# Patient Record
Sex: MALE | Race: WHITE | Employment: OTHER | URBAN - METROPOLITAN AREA
[De-identification: names, ages, dates, MRNs, and addresses within clinical notes are randomized per-mention and may not be internally consistent; named-entity substitution may affect disease eponyms.]

---

## 2018-11-14 PROBLEM — R63.30 FEEDING DIFFICULTIES AND MISMANAGEMENT: Status: ACTIVE | Noted: 2018-11-14

## 2018-12-12 ENCOUNTER — HOSPITAL ENCOUNTER (OUTPATIENT)
Dept: LAB | Age: 58
Discharge: HOME | End: 2018-12-12
Payer: COMMERCIAL

## 2018-12-12 PROCEDURE — 87077 CULTURE AEROBIC IDENTIFY: CPT

## 2018-12-12 PROCEDURE — 87205 SMEAR GRAM STAIN: CPT

## 2018-12-12 PROCEDURE — 87186 SC STD MICRODIL/AGAR DIL: CPT

## 2018-12-16 LAB
BACTERIA SPEC CULT: ABNORMAL
BACTERIA SPEC CULT: ABNORMAL
GRAM STN SPEC: ABNORMAL
SERVICE CMNT-IMP: ABNORMAL

## 2019-02-07 ENCOUNTER — DOCUMENTATION ONLY (OUTPATIENT)
Dept: OTOLARYNGOLOGY | Age: 59
End: 2019-02-07

## 2019-02-26 ENCOUNTER — ANESTHESIA EVENT (OUTPATIENT)
Dept: ENDOSCOPY | Age: 59
End: 2019-02-26
Payer: COMMERCIAL

## 2019-02-26 RX ORDER — SODIUM CHLORIDE, SODIUM LACTATE, POTASSIUM CHLORIDE, CALCIUM CHLORIDE 600; 310; 30; 20 MG/100ML; MG/100ML; MG/100ML; MG/100ML
100 INJECTION, SOLUTION INTRAVENOUS CONTINUOUS
Status: CANCELLED | OUTPATIENT
Start: 2019-02-26

## 2019-02-27 ENCOUNTER — ANESTHESIA (OUTPATIENT)
Dept: ENDOSCOPY | Age: 59
End: 2019-02-27
Payer: COMMERCIAL

## 2019-02-27 ENCOUNTER — HOSPITAL ENCOUNTER (OUTPATIENT)
Age: 59
Setting detail: OUTPATIENT SURGERY
Discharge: HOME | End: 2019-02-27
Attending: INTERNAL MEDICINE | Admitting: INTERNAL MEDICINE
Payer: COMMERCIAL

## 2019-02-27 VITALS
HEIGHT: 71 IN | DIASTOLIC BLOOD PRESSURE: 72 MMHG | HEART RATE: 85 BPM | SYSTOLIC BLOOD PRESSURE: 119 MMHG | OXYGEN SATURATION: 92 % | TEMPERATURE: 98 F | WEIGHT: 140 LBS | RESPIRATION RATE: 16 BRPM | BODY MASS INDEX: 19.6 KG/M2

## 2019-02-27 PROCEDURE — 77030007289 HC DEV ROTH NET RETRV STRC -B: Performed by: INTERNAL MEDICINE

## 2019-02-27 PROCEDURE — 77030020268 HC MISC GENERAL SUPPLY: Performed by: INTERNAL MEDICINE

## 2019-02-27 PROCEDURE — 74011000250 HC RX REV CODE- 250

## 2019-02-27 PROCEDURE — 76040000026: Performed by: INTERNAL MEDICINE

## 2019-02-27 PROCEDURE — 74011250636 HC RX REV CODE- 250/636

## 2019-02-27 PROCEDURE — 76060000032 HC ANESTHESIA 0.5 TO 1 HR: Performed by: INTERNAL MEDICINE

## 2019-02-27 PROCEDURE — 77030013991 HC SNR POLYP ENDOSC BSC -A: Performed by: INTERNAL MEDICINE

## 2019-02-27 PROCEDURE — 74011250636 HC RX REV CODE- 250/636: Performed by: ANESTHESIOLOGY

## 2019-02-27 RX ORDER — PROPOFOL 10 MG/ML
INJECTION, EMULSION INTRAVENOUS AS NEEDED
Status: DISCONTINUED | OUTPATIENT
Start: 2019-02-27 | End: 2019-02-27 | Stop reason: HOSPADM

## 2019-02-27 RX ORDER — LIDOCAINE HYDROCHLORIDE 20 MG/ML
INJECTION, SOLUTION EPIDURAL; INFILTRATION; INTRACAUDAL; PERINEURAL AS NEEDED
Status: DISCONTINUED | OUTPATIENT
Start: 2019-02-27 | End: 2019-02-27 | Stop reason: HOSPADM

## 2019-02-27 RX ORDER — EPHEDRINE SULFATE 50 MG/ML
INJECTION, SOLUTION INTRAVENOUS AS NEEDED
Status: DISCONTINUED | OUTPATIENT
Start: 2019-02-27 | End: 2019-02-27 | Stop reason: HOSPADM

## 2019-02-27 RX ORDER — PROPOFOL 10 MG/ML
INJECTION, EMULSION INTRAVENOUS
Status: DISCONTINUED | OUTPATIENT
Start: 2019-02-27 | End: 2019-02-27 | Stop reason: HOSPADM

## 2019-02-27 RX ORDER — SODIUM CHLORIDE, SODIUM LACTATE, POTASSIUM CHLORIDE, CALCIUM CHLORIDE 600; 310; 30; 20 MG/100ML; MG/100ML; MG/100ML; MG/100ML
100 INJECTION, SOLUTION INTRAVENOUS CONTINUOUS
Status: DISCONTINUED | OUTPATIENT
Start: 2019-02-27 | End: 2019-02-27 | Stop reason: HOSPADM

## 2019-02-27 RX ORDER — SODIUM CHLORIDE, SODIUM LACTATE, POTASSIUM CHLORIDE, CALCIUM CHLORIDE 600; 310; 30; 20 MG/100ML; MG/100ML; MG/100ML; MG/100ML
INJECTION, SOLUTION INTRAVENOUS
Status: DISCONTINUED | OUTPATIENT
Start: 2019-02-27 | End: 2019-02-27 | Stop reason: HOSPADM

## 2019-02-27 RX ADMIN — PROPOFOL 50 MG: 10 INJECTION, EMULSION INTRAVENOUS at 10:17

## 2019-02-27 RX ADMIN — SODIUM CHLORIDE, SODIUM LACTATE, POTASSIUM CHLORIDE, CALCIUM CHLORIDE: 600; 310; 30; 20 INJECTION, SOLUTION INTRAVENOUS at 10:07

## 2019-02-27 RX ADMIN — LIDOCAINE HYDROCHLORIDE 50 MG: 20 INJECTION, SOLUTION EPIDURAL; INFILTRATION; INTRACAUDAL; PERINEURAL at 10:17

## 2019-02-27 RX ADMIN — PROPOFOL 200 MCG/KG/MIN: 10 INJECTION, EMULSION INTRAVENOUS at 10:19

## 2019-02-27 RX ADMIN — SODIUM CHLORIDE, SODIUM LACTATE, POTASSIUM CHLORIDE, AND CALCIUM CHLORIDE 100 ML/HR: 600; 310; 30; 20 INJECTION, SOLUTION INTRAVENOUS at 09:59

## 2019-02-27 RX ADMIN — EPHEDRINE SULFATE 5 MG: 50 INJECTION, SOLUTION INTRAVENOUS at 10:24

## 2019-02-27 RX ADMIN — PROPOFOL 40 MG: 10 INJECTION, EMULSION INTRAVENOUS at 10:19

## 2019-02-27 NOTE — ANESTHESIA POSTPROCEDURE EVALUATION
Procedure(s): ESOPHAGOGASTRODUODENOSCOPY (EGD) COLONOSCOPY BMI 19 ESOPHAGEAL DILATION 
PERCUTANEOUS ENDOSCOPIC GASTROSTOMY TUBE CHANGE 
ENDOSCOPIC FOREIGN BODY REMOVAL. Anesthesia Post Evaluation Patient location during evaluation: PACU Patient participation: complete - patient participated Level of consciousness: awake Pain management: satisfactory to patient Airway patency: patent Anesthetic complications: no 
Cardiovascular status: hemodynamically stable Respiratory status: spontaneous ventilation Hydration status: euvolemic Post anesthesia nausea and vomiting:  none Visit Vitals /58 Pulse 80 Temp 36.7 °C (98 °F) Resp 14 Ht 5' 11\" (1.803 m) Wt 63.5 kg (140 lb) SpO2 97% BMI 19.53 kg/m²

## 2019-02-27 NOTE — PROCEDURES
COLONOSCOPY    DATE of PROCEDURE: 2/27/2019    INDICATIONS:  1. Screening for colon cancer     MEDICATION:  MAC      INSTRUMENT: PCF    PROCEDURE: After obtaining informed consent, the patient was placed in the left lateral position and sedated. The endoscope was advanced to the descending colon. The preparation was poor from the rectum to the descending colon, so the procedure was discontinued. He had not followed preparation and had had blended food yesterday. Estimated blood loss: 0-minimal     ASSESSMENT/PLAN:  1.  Repeat colonoscopy with diet adherence       Jenny Saldana MD  [facility], [place]

## 2019-02-27 NOTE — ROUTINE PROCESS
VSS. No complaints noted. Education reviewed and signed with patient and wife. Pt discharged via wheelchair by Diya Robles RN.

## 2019-02-27 NOTE — PROCEDURES
Esophagogastroduodenoscopy    DATE of PROCEDURE: 2/27/2019    INDICATIONS:  1. Dysphagia  2. PEG change    MEDICATIONS ADMINISTERED: MAC    INSTRUMENT: GIF    PROCEDURE:  After obtaining informed consent, the patient was placed in the left lateral position and sedated. The endoscope was advanced under direct vision without difficulty. The esophagus, stomach (including retroflexed views) and duodenum were evaluated. The patient was taken to the recovery area in stable condition. FINDINGS:  ESOPHAGUS: Radiation stricture in the proximal esophagus. Serial dilation up to [address] was performed with mild trauma. STOMACH: Gastric tube present. There was no port to deflate the plastic inner bumper. Therefore, the tube portion was cut with scissors on the skin side of the patient. The inner bumper was grasped witha Barrera net, but was unable to be pulled through the proximal esophageal stricture. On relook, there was some mild trauma after attempting to pull the bumper through, so I could not dilate further. I attempted to cut the bumper with a snare, but the rubber would not cut. We have no electrocautery snares available that function without the patient being padded, so I could not electrocauterize the snare to help with cutting. The internal bumper was therefore left in the patient, as it is less than 2 cm in diameter and should pass spontaneously. DUODENUM: Normal     Estimated blood loss: 0-minimal     PLAN:  1. Abdominal x-ray 1 week   2.  Proceed with colonoscopy     Iona Bill MD  [facility], [place]

## 2019-02-27 NOTE — H&P
[facility] H&P (Admission)        Date:  2019    Chief Complaint: dysphagia, colon cancer screen    Subjective:     History of Present Illness:  Patient is a 60 y.o. being admitted for EGD/colon. PMH:  Past Medical History:   Diagnosis Date    Chronic pain     arms    Hypothyroidism     controlled with medication    [facility]per[facility]stomy[facility] [address]     Pneumonia     Throat cancer ([facility] [address]        PSH:  Past Surgical History:   Procedure Laterality Date    ABDOMEN SURGERY PROC UNLISTED      PEG tube insertion    HX APPENDECTOMY      HX COLONOSCOPY         Allergies: Allergies   Allergen Reactions    Demerol [Meperidine] Rash       Home Medications:  Prior to Admission medications    Medication Sig Start Date End Date Taking? Authorizing Provider   oxyCODONE-acetaminophen (PERCOCET)  mg per tablet Take 1 Tab by mouth every six (6) hours as needed for Pain. Yes Provider, Historical   fluticasone (FLONASE) 50 mcg/actuation nasal spray 2 Sprays by Both Nostrils route daily. 18  Yes Martina Mora MD   fluconazole (DIFLUCAN) 100 mg tablet Take 200 mg by mouth daily. FDA advises cautious prescribing of oral fluconazole in pregnancy.    Yes Provider, Historical   thyroid, Pork, (ARMOUR THYROID) 15 mg tablet 1 q daily 18  Yes Nat Chou MD       Hospital Medications:  Current Facility-Administered Medications   Medication Dose Route Frequency    lactated Ringers infusion  100 mL/hr IntraVENous CONTINUOUS       Social History:  Social History     Tobacco Use    Smoking status: Former Smoker     Packs/day: 1.00     Years: 20.00     Pack years: 20.00     Last attempt to quit:      Years since quittin.1    Smokeless tobacco: Never Used   Substance Use Topics    Alcohol use: No     Frequency: Never         Family History:  Family History   Problem Relation Age of Onset    Diabetes Mother        Review of Systems:  A detailed 10 system ROS is obtained, with pertinent positives as listed above. All others are negative. Objective:     Physical Exam:  Vitals:  Visit Vitals  /80   Pulse 93   Temp 98.3 °F (36.8 °C)   Resp 18   Ht 5' 11\" (1.803 m)   Wt 63.5 kg (140 lb)   SpO2 91%   BMI 19.53 kg/m²     Gen:  Pt is alert, cooperative, no acute distress  Skin: no large lesions  HEENT: MMM  Cardiovascular: Regular rate and rhythm. No murmurs, gallops, or rubs. Respiratory:  Comfortable breathing with no accessory muscle use. Clear breath sounds with no wheezes, rales, or rhonchi. GI:  Abdomen nondistended, soft, and nontender. Normal active bowel sounds. PEG in place  Neurological:  Gross memory appears intact. Patient is alert and oriented. Psychiatric:  Mood appears appropriate with judgement intact. Laboratory:    No results for input(s): WBC, HGB, HCT, PLT, MCV, NA, K, CL, CO2, BUN, CREA, CA, MG, GLU, AP, SGOT, GPT, TBIL, CBIL, ALB, TP, AML, LPSE, PTP, INR, APTT, HGBEXT, HCTEXT, PLTEXT in the last 72 hours. No lab exists for component: DBIL, INREXT    Assessment:       Active Problems:    * No active hospital problems. *      Plan:     Endoscopy and risks explained to the patient. Risks including reaction to sedation, cardiopulmonary events, infection, bleeding, perforation, requirement for surgery if complications should occur, death were explained to patient who expressed understanding and agreed to proceed with endoscopy.         Vern Hurt MD  [facility], [place]

## 2019-02-27 NOTE — DISCHARGE INSTRUCTIONS
Gastrointestinal Esophagogastroduodenoscopy (EGD) - Upper Exam Discharge Instructions    1. Call Dr. Bryan Lopez at [phone] for any problems or questions. 2. Contact the doctor's office for follow up appointment as directed. 3. Medication may cause drowsiness for several hours, therefore, do not drive or operate machinery for remainder of the day. 4. No alcohol today. 5. Ordinarily, you may resume regular diet and activity after exam unless otherwise specified by your physician. 6. For mild soreness in your throat you may use Cepacol throat lozenges or warm salt-water gargles as needed.     Any additional instructions:  X-ray abdomen in one week  Repeat colonoscopy with adequate bowel prep

## 2019-08-15 ENCOUNTER — HOSPITAL ENCOUNTER (OUTPATIENT)
Dept: ULTRASOUND IMAGING | Age: 59
Discharge: HOME | End: 2019-08-15
Attending: FAMILY MEDICINE
Payer: COMMERCIAL

## 2019-08-15 DIAGNOSIS — N45.1 RIGHT EPIDIDYMITIS: ICD-10-CM

## 2019-08-15 PROCEDURE — 76870 US EXAM SCROTUM: CPT

## 2019-08-29 ENCOUNTER — HOSPITAL ENCOUNTER (OUTPATIENT)
Dept: LAB | Age: 59
Discharge: HOME | End: 2019-08-29
Attending: INTERNAL MEDICINE
Payer: COMMERCIAL

## 2019-08-29 DIAGNOSIS — J01.00 ACUTE MAXILLARY SINUSITIS, RECURRENCE NOT SPECIFIED: ICD-10-CM

## 2019-08-29 PROCEDURE — 87153 DNA/RNA SEQUENCING: CPT

## 2019-08-29 PROCEDURE — 87070 CULTURE OTHR SPECIMN AEROBIC: CPT

## 2019-08-29 PROCEDURE — 87075 CULTR BACTERIA EXCEPT BLOOD: CPT

## 2019-08-29 PROCEDURE — 87077 CULTURE AEROBIC IDENTIFY: CPT

## 2019-08-29 PROCEDURE — 87186 SC STD MICRODIL/AGAR DIL: CPT

## 2019-08-29 PROCEDURE — 87076 CULTURE ANAEROBE IDENT EACH: CPT

## 2019-08-30 ENCOUNTER — HOSPITAL ENCOUNTER (OUTPATIENT)
Dept: LAB | Age: 59
Discharge: HOME | End: 2019-08-30

## 2019-09-03 ENCOUNTER — HOSPITAL ENCOUNTER (OUTPATIENT)
Dept: CT IMAGING | Age: 59
Discharge: HOME | End: 2019-09-03
Payer: COMMERCIAL

## 2019-09-03 DIAGNOSIS — T17.908D CHRONIC PULMONARY ASPIRATION, SUBSEQUENT ENCOUNTER: ICD-10-CM

## 2019-09-03 PROCEDURE — 71250 CT THORAX DX C-: CPT

## 2019-09-04 ENCOUNTER — HOSPITAL ENCOUNTER (INPATIENT)
Age: 59
LOS: 6 days | Discharge: HOME | DRG: 177 | End: 2019-09-10
Attending: INTERNAL MEDICINE | Admitting: INTERNAL MEDICINE
Payer: COMMERCIAL

## 2019-09-04 DIAGNOSIS — J01.00 ACUTE MAXILLARY SINUSITIS, RECURRENCE NOT SPECIFIED: ICD-10-CM

## 2019-09-04 DIAGNOSIS — R06.89 INEFFECTIVE AIRWAY CLEARANCE: ICD-10-CM

## 2019-09-04 DIAGNOSIS — J96.01 ACUTE RESPIRATORY FAILURE WITH HYPOXEMIA (HCC): ICD-10-CM

## 2019-09-04 DIAGNOSIS — R07.89 CHEST WALL PAIN: ICD-10-CM

## 2019-09-04 DIAGNOSIS — T17.908S CHRONIC PULMONARY ASPIRATION, SEQUELA: ICD-10-CM

## 2019-09-04 DIAGNOSIS — J15.5: ICD-10-CM

## 2019-09-04 PROBLEM — T17.908A ASPIRATION, CHRONIC PULMONARY: Status: ACTIVE | Noted: 2019-09-04

## 2019-09-04 PROCEDURE — 87040 BLOOD CULTURE FOR BACTERIA: CPT

## 2019-09-04 PROCEDURE — 77010033678 HC OXYGEN DAILY

## 2019-09-04 PROCEDURE — 77030005112 HC TU GASTMY LP MIC KEY AVNM -B

## 2019-09-04 PROCEDURE — 77030012794 HC KT NSL CANN/HGH TRAN -A

## 2019-09-04 PROCEDURE — 65270000029 HC RM PRIVATE

## 2019-09-04 PROCEDURE — 74011250637 HC RX REV CODE- 250/637: Performed by: INTERNAL MEDICINE

## 2019-09-04 PROCEDURE — 77030020245 HC SOL INJ 5% D/0.9%NACL

## 2019-09-04 PROCEDURE — 77030018846 HC SOL IRR STRL H20 ICUM -A

## 2019-09-04 PROCEDURE — 74011000258 HC RX REV CODE- 258: Performed by: INTERNAL MEDICINE

## 2019-09-04 PROCEDURE — 74011000250 HC RX REV CODE- 250: Performed by: INTERNAL MEDICINE

## 2019-09-04 PROCEDURE — 94760 N-INVAS EAR/PLS OXIMETRY 1: CPT

## 2019-09-04 PROCEDURE — 36415 COLL VENOUS BLD VENIPUNCTURE: CPT

## 2019-09-04 PROCEDURE — 99223 1ST HOSP IP/OBS HIGH 75: CPT | Performed by: INTERNAL MEDICINE

## 2019-09-04 PROCEDURE — 94640 AIRWAY INHALATION TREATMENT: CPT

## 2019-09-04 PROCEDURE — 74011250636 HC RX REV CODE- 250/636: Performed by: INTERNAL MEDICINE

## 2019-09-04 RX ORDER — LORAZEPAM 1 MG/1
1 TABLET ORAL
Status: DISCONTINUED | OUTPATIENT
Start: 2019-09-04 | End: 2019-09-10 | Stop reason: HOSPADM

## 2019-09-04 RX ORDER — AMOXICILLIN 250 MG
1 CAPSULE ORAL DAILY PRN
Status: DISCONTINUED | OUTPATIENT
Start: 2019-09-04 | End: 2019-09-04

## 2019-09-04 RX ORDER — ACETAMINOPHEN 325 MG/1
650 TABLET ORAL
Status: DISCONTINUED | OUTPATIENT
Start: 2019-09-04 | End: 2019-09-04

## 2019-09-04 RX ORDER — HYDROCODONE BITARTRATE AND ACETAMINOPHEN 10; 325 MG/1; MG/1
1 TABLET ORAL
Status: DISCONTINUED | OUTPATIENT
Start: 2019-09-04 | End: 2019-09-04

## 2019-09-04 RX ORDER — ENOXAPARIN SODIUM 100 MG/ML
40 INJECTION SUBCUTANEOUS EVERY 24 HOURS
Status: DISCONTINUED | OUTPATIENT
Start: 2019-09-04 | End: 2019-09-10 | Stop reason: HOSPADM

## 2019-09-04 RX ORDER — FAMOTIDINE 40 MG/5ML
20 POWDER, FOR SUSPENSION ORAL 2 TIMES DAILY
Status: DISCONTINUED | OUTPATIENT
Start: 2019-09-04 | End: 2019-09-10 | Stop reason: HOSPADM

## 2019-09-04 RX ORDER — NALOXONE HYDROCHLORIDE 0.4 MG/ML
0.4 INJECTION, SOLUTION INTRAMUSCULAR; INTRAVENOUS; SUBCUTANEOUS AS NEEDED
Status: DISCONTINUED | OUTPATIENT
Start: 2019-09-04 | End: 2019-09-10 | Stop reason: HOSPADM

## 2019-09-04 RX ORDER — ACETAMINOPHEN 325 MG/1
650 TABLET ORAL
Status: DISCONTINUED | OUTPATIENT
Start: 2019-09-04 | End: 2019-09-10 | Stop reason: HOSPADM

## 2019-09-04 RX ORDER — GUAIFENESIN 100 MG/5ML
400 SOLUTION ORAL EVERY 4 HOURS
Status: DISCONTINUED | OUTPATIENT
Start: 2019-09-04 | End: 2019-09-10 | Stop reason: HOSPADM

## 2019-09-04 RX ORDER — LORAZEPAM 1 MG/1
1 TABLET ORAL
Status: DISCONTINUED | OUTPATIENT
Start: 2019-09-04 | End: 2019-09-04

## 2019-09-04 RX ORDER — MORPHINE SULFATE 2 MG/ML
2 INJECTION, SOLUTION INTRAMUSCULAR; INTRAVENOUS
Status: DISCONTINUED | OUTPATIENT
Start: 2019-09-04 | End: 2019-09-10 | Stop reason: HOSPADM

## 2019-09-04 RX ORDER — GUAIFENESIN 600 MG/1
1200 TABLET, EXTENDED RELEASE ORAL 2 TIMES DAILY
Status: DISCONTINUED | OUTPATIENT
Start: 2019-09-04 | End: 2019-09-04

## 2019-09-04 RX ORDER — ONDANSETRON 2 MG/ML
4 INJECTION INTRAMUSCULAR; INTRAVENOUS
Status: DISCONTINUED | OUTPATIENT
Start: 2019-09-04 | End: 2019-09-10 | Stop reason: HOSPADM

## 2019-09-04 RX ORDER — SODIUM CHLORIDE 0.9 % (FLUSH) 0.9 %
5-40 SYRINGE (ML) INJECTION EVERY 8 HOURS
Status: DISCONTINUED | OUTPATIENT
Start: 2019-09-04 | End: 2019-09-10 | Stop reason: HOSPADM

## 2019-09-04 RX ORDER — DEXTROSE MONOHYDRATE AND SODIUM CHLORIDE 5; .9 G/100ML; G/100ML
75 INJECTION, SOLUTION INTRAVENOUS CONTINUOUS
Status: DISPENSED | OUTPATIENT
Start: 2019-09-04 | End: 2019-09-06

## 2019-09-04 RX ORDER — SODIUM CHLORIDE FOR INHALATION 3 %
4 VIAL, NEBULIZER (ML) INHALATION 2 TIMES DAILY
Status: DISCONTINUED | OUTPATIENT
Start: 2019-09-04 | End: 2019-09-08

## 2019-09-04 RX ORDER — TEMAZEPAM 15 MG/1
30 CAPSULE ORAL
Status: DISCONTINUED | OUTPATIENT
Start: 2019-09-04 | End: 2019-09-04

## 2019-09-04 RX ORDER — AMOXICILLIN 250 MG
1 CAPSULE ORAL DAILY PRN
Status: DISCONTINUED | OUTPATIENT
Start: 2019-09-04 | End: 2019-09-10 | Stop reason: HOSPADM

## 2019-09-04 RX ORDER — SODIUM CHLORIDE 0.9 % (FLUSH) 0.9 %
5-40 SYRINGE (ML) INJECTION AS NEEDED
Status: DISCONTINUED | OUTPATIENT
Start: 2019-09-04 | End: 2019-09-10 | Stop reason: HOSPADM

## 2019-09-04 RX ORDER — PANTOPRAZOLE SODIUM 40 MG/1
40 TABLET, DELAYED RELEASE ORAL
Status: DISCONTINUED | OUTPATIENT
Start: 2019-09-05 | End: 2019-09-04

## 2019-09-04 RX ORDER — TEMAZEPAM 15 MG/1
30 CAPSULE ORAL
Status: DISCONTINUED | OUTPATIENT
Start: 2019-09-04 | End: 2019-09-10 | Stop reason: HOSPADM

## 2019-09-04 RX ORDER — HYDROCODONE BITARTRATE AND ACETAMINOPHEN 10; 325 MG/1; MG/1
1 TABLET ORAL
Status: DISCONTINUED | OUTPATIENT
Start: 2019-09-04 | End: 2019-09-07

## 2019-09-04 RX ADMIN — GUAIFENESIN 400 MG: 100 SOLUTION ORAL at 23:39

## 2019-09-04 RX ADMIN — DEXTROSE MONOHYDRATE AND SODIUM CHLORIDE 75 ML/HR: 5; .9 INJECTION, SOLUTION INTRAVENOUS at 20:22

## 2019-09-04 RX ADMIN — MORPHINE SULFATE 2 MG: 2 INJECTION, SOLUTION INTRAMUSCULAR; INTRAVENOUS at 23:36

## 2019-09-04 RX ADMIN — FAMOTIDINE 20 MG: 40 POWDER, FOR SUSPENSION ORAL at 22:08

## 2019-09-04 RX ADMIN — Medication 5 ML: at 20:22

## 2019-09-04 RX ADMIN — ENOXAPARIN SODIUM 40 MG: 40 INJECTION SUBCUTANEOUS at 20:27

## 2019-09-04 RX ADMIN — CEFEPIME HYDROCHLORIDE 2 G: 2 INJECTION, POWDER, FOR SOLUTION INTRAVENOUS at 22:09

## 2019-09-04 RX ADMIN — METHYLPREDNISOLONE SODIUM SUCCINATE 40 MG: 40 INJECTION, POWDER, FOR SOLUTION INTRAMUSCULAR; INTRAVENOUS at 20:22

## 2019-09-04 RX ADMIN — ACETAMINOPHEN 650 MG: 325 TABLET ORAL at 22:10

## 2019-09-04 RX ADMIN — GUAIFENESIN 400 MG: 100 SOLUTION ORAL at 21:29

## 2019-09-04 RX ADMIN — SODIUM CHLORIDE 30 MG/ML INHALATION SOLUTION 4 ML: 30 SOLUTION INHALANT at 21:19

## 2019-09-04 NOTE — PROGRESS NOTES
Pt admitted from [facility] he is alert and oriented rr are even and unlabored he has dyspnea on exertion. Lung sounds are course O2 in place 6L NC pt O2 sat was 65% on arrival pt placed on O2 6L highflow O2 sat is now 90%. IV placed. PEG in place. Consult to dietary placed consult to hospitalist placed. Pt BP was 94/58. Family at bedside safety measures in place will continue to monitor.

## 2019-09-04 NOTE — H&P
HISTORY AND PHYSICAL      Juanjose Cowart    9/4/2019    Date of Admission: 9/4/19    The patient's chart is reviewed and the patient is discussed with the staff. HPI:     Juanjose Cowart is a 60y.o. 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 He underwent a CT of the chest on 7/6 which revealed diffuse lower lobe predominant nodular opacities with plugging and nonspecific mediastinal and hilar lymphadenopathy. No pulmonary embolism was noted. He had a leukocytosis measured at 18.8k with a neutrophilic predominance. Admission wasn't recommended and he was discharged home.       He was then seen on August 12 by Dr. Vidhi Berrios after completing antibiotics with Augmentin and Cipro. At that appointment he was started on Septra, Protonix and nystatin with Magic mouthwash for oral candidiasis and sinusitis. He was noted to have right neck discomfort and a CT of the neck was recommended if his symptoms of right neck pain continued.     He was initially seen at [facility] on August 29, when his oxygen saturation was noted to be 83% on room air. He reported he had just experienced his first night without a fever since July. He reported significant right sinus pain and congestion as well as a persistent cough. Hospitalization was recommended, but the patient declined in favor of oral antibiotics and oxygen. Levaquin and clindamycin were initiated and oxygen was ordered. The oxygen was not delivered over the holiday weekend and the patient has had a very difficult course. On Monday 9/2, Levaquin was changed to [facility] after sputum cultures revealed E.  Coli which was resistant to levaquin.     Congestion has remained heavy. The patient remains hypoxic. He feels weak and complains of back and chest pain. He says that the prednisone he had previously been on for sinus congestion by Dr. Alvaro Sanders was helping but since he has completed the course he feels worse again. He also reports severe nightmares. He is willing to consider hospitalization for IV antibiotics at this time. Home DME company= [employer]    REVIEW OF SYSTEMS:  CONSTITUTIONAL:  There is + fevers, weight loss, fatigue  EYES:  Denies problems with eye pain, erythema, blurred vision, or visual field loss. ENTM:  Denies history of tinnitus, epistaxis, + sore throat and hoarseness  LYMPH:  Denies swollen glands. CARDIAC:  No chest pain, pressure, discomfort, palpitations, orthopnea, murmurs, or edema. GI:  No dysphagia, heartburn reflux, nausea/vomiting, diarrhea, abdominal pain, or bleeding. :Denies history of dysuria, hematuria, polyuria, or decreased urine output. MS:  + back pain  SKIN:  No history of rashes, jaundice, cyanosis, nodules, or ulcers. ENDO:  Negative for heat or cold intolerance.  + hypothyroidism on [place] thyroid  PSYCH:  Negative for anxiety, depression, insomnia, hallucinations. NEURO:  There is no history of AMS, persistent headache, decreased level of consciousness, seizures, or motor or sensory deficits. Cannot display prior to admission medications because the patient has not been admitted in this contact.        Past Medical History:   Diagnosis Date    Chronic pain     arms    Hypothyroidism     controlled with medication    PEG (percutaneous endoscopic gastrostomy) adjustment/replacement/removal ([address]     Pneumonia     Throat cancer ([facility] [address] 2005     Past Surgical History:   Procedure Laterality Date    ABDOMEN SURGERY PROC UNLISTED  2015    PEG tube insertion    COLONOSCOPY N/A 2/27/2019    COLONOSCOPY BMI 19 performed by Dane Alvarado MD at [facility] ENDOSCOPY    HX APPENDECTOMY      HX COLONOSCOPY       Social History     Socioeconomic History    Marital status:      Spouse name: Not on file    Number of children: Not on file    Years of education: Not on file    Highest education level: Not on file   Occupational History    Not on file   Social Needs    Financial resource strain: Not on file    Food insecurity:     Worry: Not on file     Inability: Not on file    Transportation needs:     Medical: Not on file     Non-medical: Not on file   Tobacco Use    Smoking status: Former Smoker     Packs/day: 1.00     Years: 20.00     Pack years: 20.00     Types: Cigarettes     Last attempt to quit:      Years since quittin.6    Smokeless tobacco: Never Used   Substance and Sexual Activity    Alcohol use: No     Frequency: Never    Drug use: No    Sexual activity: Not on file   Lifestyle    Physical activity:     Days per week: Not on file     Minutes per session: Not on file    Stress: Not on file   Relationships    Social connections:     Talks on phone: Not on file     Gets together: Not on file     Attends [employer] service: Not on file     Active member of club or organization: Not on file     Attends meetings of clubs or organizations: Not on file     Relationship status: Not on file    Intimate partner violence:     Fear of current or ex partner: Not on file     Emotionally abused: Not on file     Physically abused: Not on file     Forced sexual activity: Not on file   Other Topics Concern    Not on file   Social History Narrative    Not on file     Family History   Problem Relation Age of Onset    Diabetes Mother      Allergies   Allergen Reactions    Demerol [Meperidine] Rash     Current Outpatient Medications   Medication Sig    cefpodoxime (VANTIN) 200 mg tablet Take 1 Tab by mouth two (2) times a day for 14 days.     clindamycin (CLEOCIN) 300 mg capsule Take 2 capsules three times a day    trimethoprim-sulfamethoxazole (BACTRIM DS, SEPTRA DS) 160-800 mg per tablet Take 1 Tab by mouth two (2) times a day for 10 days.  predniSONE (DELTASONE) 10 mg tablet Take 10 mg by mouth daily. 6 tabs po for1 days, 5 tabs po for 1days, 4 tab po for  Days, 3 tabs for 1 day, 2 tabs for 1 day the 1 tab    HYDROcodone-acetaminophen (NORCO)  mg tablet Take 1 Tab by mouth every six (6) hours as needed for Pain for up to 10 days. Max Daily Amount: 4 Tabs.  magic mouthwash solution Magic Mouthwash wo Xylocaine  Hydrocortisone powder 15 mg  Nystatin 15 cc  Benadryl Elixir 30 cc  Tetracycline 500mg QS to 120 cc with water    1 teaspoon swish and spit out after meals and at bedtime    pantoprazole (PROTONIX) 40 mg tablet Take 1 Tab by mouth daily for 30 days.  thyroid, Pork, (ARMOUR THYROID) 15 mg tablet 1 q daily         Objective:     PHYSICAL EXAM   There were no vitals filed for this visit. Constitutional:  the patient is pale and thin  EENMT:  Sclera clear, pupils equal, oral mucosa dry  Respiratory: diminished lung sounds with rhonchi  Cardiovascular:  RRR without M,G,R  Gastrointestinal: soft and non-tender; with positive bowel sounds. Musculoskeletal: warm without cyanosis. There is no lower leg edema. Skin:  no jaundice or rashes, no  Neurologic: no gross neuro deficits     Psychiatric:  alert and oriented x 3    Sputum culture 8/29    Assessment:  (Medical Decision Making)     Hospital Problems  Date Reviewed: 9/4/2019          Codes Class Noted POA    Acute respiratory failure with hypoxemia [facility]) ICD-10-CM: J96.01  ICD-9-CM: 518.81  9/4/2019 Unknown    Will require supplemental oxygen and we can determine if he is appropriate for bronchoscopy for cleanout in am.      Pneumonia of both lungs due to Escherichia coli [facility]) ICD-10-CM: J15.5  ICD-9-CM: 482.82  9/4/2019 Unknown    Recently on vantin without improvement. Will start cefepime.       Acute maxillary sinusitis ICD-10-CM: J01.00  ICD-9-CM: 461.0  9/4/2019 Unknown    Monitor on cefepime and continue saline spray, supportive care. Ineffective airway clearance ICD-10-CM: R06.89  ICD-9-CM: 786.09  9/4/2019 Unknown    Consider bronchoscopy tomorrow given failure to improve and debris in airways on CT. Aspiration, chronic pulmonary ICD-10-CM: T17.908A  ICD-9-CM: 934.9  9/4/2019 Unknown    With PEG feeding          Plan:  (Medical Decision Making)     --Will admit directly for further medical management  --Supplemental O2   --Will obtain blood cultures, procal and start IV cefepime  --NPO after midnight for possible bronch tomorrow  --start IV fluids  --IV steroids resumed given symptomatic improvement in cough as outpatient with medrol.  --prn pain medication  --appreciate assistance from hospitalist service for primary care. --Note that patient hoping it can be arranged for him to go home with IV abx if at all possible. --DVT ppx, PPI ordered. than 50% of the time documented was spent in face-to-face contact with the patient and in the care of the patient on the floor/unit where the patient is located.     Amador Escalante MD

## 2019-09-04 NOTE — ASSESSMENT & PLAN NOTE
[facility]             cefpodoxime (VANTIN) 200 mg tablet Take 1 Tab by mouth two (2) times a day for 14 days. clindamycin (CLEOCIN) 300 mg capsule Take 2 capsules three times a day    trimethoprim-sulfamethoxazole (BACTRIM DS, SEPTRA DS) 160-800 mg per tablet Take 1 Tab by mouth two (2) times a day for 10 days.         Lab Results   Component Value Date/Time    WBC (POC) 17.6 08/27/2019 10:39 AM    HGB (POC) 12.1 08/27/2019 10:39 AM    HCT (POC) 38.6 08/27/2019 10:39 AM    PLATELET (POC) 176 38/10/8286 10:39 AM    Creatinine 0.98 11/14/2018 03:26 PM    BUN 14 11/14/2018 03:26 PM

## 2019-09-05 LAB
ANION GAP SERPL CALC-SCNC: 7 MMOL/L (ref 7–16)
BRONCH. LAVAGE DIFF.,BR: NORMAL
BUN SERPL-MCNC: 12 MG/DL (ref 8–23)
CALCIUM SERPL-MCNC: 8.5 MG/DL (ref 8.3–10.4)
CHLORIDE SERPL-SCNC: 99 MMOL/L (ref 98–107)
CO2 SERPL-SCNC: 27 MMOL/L (ref 21–32)
CREAT SERPL-MCNC: 0.54 MG/DL (ref 0.8–1.5)
EOSINOPHIL NFR BRONCH MANUAL: 0 %
ERYTHROCYTE [DISTWIDTH] IN BLOOD BY AUTOMATED COUNT: 14.7 % (ref 11.9–14.6)
GLUCOSE SERPL-MCNC: 115 MG/DL (ref 65–100)
HCT VFR BLD AUTO: 38.8 % (ref 41.1–50.3)
HGB BLD-MCNC: 12.7 G/DL (ref 13.6–17.2)
LYMPHOCYTES NFR BRONCH MANUAL: 17 %
MACROPHAGES NFR BRONCH MANUAL: 3 %
MCH RBC QN AUTO: 29.2 PG (ref 26.1–32.9)
MCHC RBC AUTO-ENTMCNC: 32.7 G/DL (ref 31.4–35)
MCV RBC AUTO: 89.2 FL (ref 79.6–97.8)
NEUTROPHILS NFR BRONCH MANUAL: 80 %
NRBC # BLD: 0 K/UL (ref 0–0.2)
PLATELET # BLD AUTO: 375 K/UL (ref 150–450)
PMV BLD AUTO: 10.7 FL (ref 9.4–12.3)
POTASSIUM SERPL-SCNC: 4.4 MMOL/L (ref 3.5–5.1)
PROCALCITONIN SERPL-MCNC: 0.2 NG/ML
RBC # BLD AUTO: 4.35 M/UL (ref 4.23–5.6)
SODIUM SERPL-SCNC: 133 MMOL/L (ref 136–145)
WBC # BLD AUTO: 33 K/UL (ref 4.3–11.1)

## 2019-09-05 PROCEDURE — 88112 CYTOPATH CELL ENHANCE TECH: CPT

## 2019-09-05 PROCEDURE — 74011250637 HC RX REV CODE- 250/637: Performed by: INTERNAL MEDICINE

## 2019-09-05 PROCEDURE — 74011250636 HC RX REV CODE- 250/636

## 2019-09-05 PROCEDURE — 87186 SC STD MICRODIL/AGAR DIL: CPT

## 2019-09-05 PROCEDURE — 94640 AIRWAY INHALATION TREATMENT: CPT

## 2019-09-05 PROCEDURE — 0B9D8ZX DRAINAGE OF RIGHT MIDDLE LUNG LOBE, VIA NATURAL OR ARTIFICIAL OPENING ENDOSCOPIC, DIAGNOSTIC: ICD-10-PCS | Performed by: INTERNAL MEDICINE

## 2019-09-05 PROCEDURE — 87116 MYCOBACTERIA CULTURE: CPT

## 2019-09-05 PROCEDURE — 99153 MOD SED SAME PHYS/QHP EA: CPT | Performed by: INTERNAL MEDICINE

## 2019-09-05 PROCEDURE — 74011250636 HC RX REV CODE- 250/636: Performed by: INTERNAL MEDICINE

## 2019-09-05 PROCEDURE — 74011000250 HC RX REV CODE- 250: Performed by: INTERNAL MEDICINE

## 2019-09-05 PROCEDURE — 87106 FUNGI IDENTIFICATION YEAST: CPT

## 2019-09-05 PROCEDURE — 65270000029 HC RM PRIVATE

## 2019-09-05 PROCEDURE — 87070 CULTURE OTHR SPECIMN AEROBIC: CPT

## 2019-09-05 PROCEDURE — 87077 CULTURE AEROBIC IDENTIFY: CPT

## 2019-09-05 PROCEDURE — 87102 FUNGUS ISOLATION CULTURE: CPT

## 2019-09-05 PROCEDURE — 85027 COMPLETE CBC AUTOMATED: CPT

## 2019-09-05 PROCEDURE — 94760 N-INVAS EAR/PLS OXIMETRY 1: CPT

## 2019-09-05 PROCEDURE — 3E0G76Z INTRODUCTION OF NUTRITIONAL SUBSTANCE INTO UPPER GI, VIA NATURAL OR ARTIFICIAL OPENING: ICD-10-PCS | Performed by: INTERNAL MEDICINE

## 2019-09-05 PROCEDURE — 76040000025: Performed by: INTERNAL MEDICINE

## 2019-09-05 PROCEDURE — 77030012699 HC VLV SUC CNTRL OCOA -A: Performed by: INTERNAL MEDICINE

## 2019-09-05 PROCEDURE — 84145 PROCALCITONIN (PCT): CPT

## 2019-09-05 PROCEDURE — 31624 DX BRONCHOSCOPE/LAVAGE: CPT | Performed by: INTERNAL MEDICINE

## 2019-09-05 PROCEDURE — 80048 BASIC METABOLIC PNL TOTAL CA: CPT

## 2019-09-05 PROCEDURE — 87305 ASPERGILLUS AG IA: CPT

## 2019-09-05 PROCEDURE — 36415 COLL VENOUS BLD VENIPUNCTURE: CPT

## 2019-09-05 PROCEDURE — 99152 MOD SED SAME PHYS/QHP 5/>YRS: CPT | Performed by: INTERNAL MEDICINE

## 2019-09-05 PROCEDURE — 86356 MONONUCLEAR CELL ANTIGEN: CPT

## 2019-09-05 PROCEDURE — 89051 BODY FLUID CELL COUNT: CPT

## 2019-09-05 PROCEDURE — 77010033678 HC OXYGEN DAILY

## 2019-09-05 PROCEDURE — 74011000258 HC RX REV CODE- 258: Performed by: INTERNAL MEDICINE

## 2019-09-05 PROCEDURE — 99232 SBSQ HOSP IP/OBS MODERATE 35: CPT | Performed by: INTERNAL MEDICINE

## 2019-09-05 RX ORDER — FENTANYL CITRATE 50 UG/ML
50 INJECTION, SOLUTION INTRAMUSCULAR; INTRAVENOUS
Status: DISCONTINUED | OUTPATIENT
Start: 2019-09-05 | End: 2019-09-05

## 2019-09-05 RX ORDER — ONDANSETRON 2 MG/ML
4-8 INJECTION INTRAMUSCULAR; INTRAVENOUS
Status: DISCONTINUED | OUTPATIENT
Start: 2019-09-05 | End: 2019-09-05 | Stop reason: HOSPADM

## 2019-09-05 RX ORDER — SODIUM CHLORIDE 0.9 % (FLUSH) 0.9 %
5-40 SYRINGE (ML) INJECTION AS NEEDED
Status: CANCELLED | OUTPATIENT
Start: 2019-09-05

## 2019-09-05 RX ORDER — SODIUM CHLORIDE 9 MG/ML
1000 INJECTION, SOLUTION INTRAVENOUS CONTINUOUS
Status: DISCONTINUED | OUTPATIENT
Start: 2019-09-05 | End: 2019-09-05 | Stop reason: HOSPADM

## 2019-09-05 RX ORDER — AMLODIPINE BESYLATE 10 MG/1
10 TABLET ORAL DAILY
Status: DISCONTINUED | OUTPATIENT
Start: 2019-09-05 | End: 2019-09-05

## 2019-09-05 RX ORDER — AMLODIPINE BESYLATE 5 MG/1
5 TABLET ORAL DAILY
Status: DISCONTINUED | OUTPATIENT
Start: 2019-09-05 | End: 2019-09-10 | Stop reason: HOSPADM

## 2019-09-05 RX ORDER — NALOXONE HYDROCHLORIDE 0.4 MG/ML
0.2 INJECTION, SOLUTION INTRAMUSCULAR; INTRAVENOUS; SUBCUTANEOUS
Status: DISCONTINUED | OUTPATIENT
Start: 2019-09-05 | End: 2019-09-05 | Stop reason: HOSPADM

## 2019-09-05 RX ORDER — LIDOCAINE HYDROCHLORIDE 40 MG/ML
SOLUTION TOPICAL ONCE
Status: DISCONTINUED | OUTPATIENT
Start: 2019-09-05 | End: 2019-09-05 | Stop reason: HOSPADM

## 2019-09-05 RX ORDER — SODIUM CHLORIDE 0.9 % (FLUSH) 0.9 %
5-40 SYRINGE (ML) INJECTION EVERY 8 HOURS
Status: CANCELLED | OUTPATIENT
Start: 2019-09-05

## 2019-09-05 RX ORDER — FENTANYL CITRATE 50 UG/ML
50 INJECTION, SOLUTION INTRAMUSCULAR; INTRAVENOUS
Status: DISCONTINUED | OUTPATIENT
Start: 2019-09-05 | End: 2019-09-05 | Stop reason: HOSPADM

## 2019-09-05 RX ORDER — LIDOCAINE HYDROCHLORIDE 20 MG/ML
JELLY TOPICAL ONCE
Status: DISCONTINUED | OUTPATIENT
Start: 2019-09-05 | End: 2019-09-05 | Stop reason: HOSPADM

## 2019-09-05 RX ORDER — LABETALOL 100 MG/1
100 TABLET, FILM COATED ORAL
Status: DISCONTINUED | OUTPATIENT
Start: 2019-09-05 | End: 2019-09-10 | Stop reason: HOSPADM

## 2019-09-05 RX ORDER — MIDAZOLAM HYDROCHLORIDE 1 MG/ML
2 INJECTION, SOLUTION INTRAMUSCULAR; INTRAVENOUS
Status: DISCONTINUED | OUTPATIENT
Start: 2019-09-05 | End: 2019-09-05 | Stop reason: HOSPADM

## 2019-09-05 RX ORDER — FLUMAZENIL 0.1 MG/ML
0.2 INJECTION INTRAVENOUS
Status: DISCONTINUED | OUTPATIENT
Start: 2019-09-05 | End: 2019-09-05 | Stop reason: HOSPADM

## 2019-09-05 RX ORDER — ALBUTEROL SULFATE 0.83 MG/ML
2.5 SOLUTION RESPIRATORY (INHALATION)
Status: DISCONTINUED | OUTPATIENT
Start: 2019-09-05 | End: 2019-09-08

## 2019-09-05 RX ORDER — CLINDAMYCIN PHOSPHATE 600 MG/50ML
600 INJECTION INTRAVENOUS EVERY 6 HOURS
Status: DISCONTINUED | OUTPATIENT
Start: 2019-09-05 | End: 2019-09-10

## 2019-09-05 RX ADMIN — MORPHINE SULFATE 2 MG: 2 INJECTION, SOLUTION INTRAMUSCULAR; INTRAVENOUS at 19:18

## 2019-09-05 RX ADMIN — MORPHINE SULFATE 2 MG: 2 INJECTION, SOLUTION INTRAMUSCULAR; INTRAVENOUS at 04:34

## 2019-09-05 RX ADMIN — Medication 10 ML: at 04:34

## 2019-09-05 RX ADMIN — METHYLPREDNISOLONE SODIUM SUCCINATE 40 MG: 40 INJECTION, POWDER, FOR SOLUTION INTRAMUSCULAR; INTRAVENOUS at 08:33

## 2019-09-05 RX ADMIN — MIDAZOLAM HYDROCHLORIDE 2 MG: 1 INJECTION, SOLUTION INTRAMUSCULAR; INTRAVENOUS at 13:00

## 2019-09-05 RX ADMIN — GUAIFENESIN 400 MG: 100 SOLUTION ORAL at 14:35

## 2019-09-05 RX ADMIN — ALBUTEROL SULFATE 2.5 MG: 2.5 SOLUTION RESPIRATORY (INHALATION) at 15:42

## 2019-09-05 RX ADMIN — MORPHINE SULFATE 2 MG: 2 INJECTION, SOLUTION INTRAMUSCULAR; INTRAVENOUS at 08:32

## 2019-09-05 RX ADMIN — FENTANYL CITRATE 50 MCG: 50 INJECTION, SOLUTION INTRAMUSCULAR; INTRAVENOUS at 13:00

## 2019-09-05 RX ADMIN — FENTANYL CITRATE 50 MCG: 50 INJECTION, SOLUTION INTRAMUSCULAR; INTRAVENOUS at 13:07

## 2019-09-05 RX ADMIN — HYDROCODONE BITARTRATE AND ACETAMINOPHEN 1 TABLET: 10; 325 TABLET ORAL at 22:36

## 2019-09-05 RX ADMIN — LABETALOL HYDROCHLORIDE 100 MG: 100 TABLET, FILM COATED ORAL at 08:33

## 2019-09-05 RX ADMIN — CLINDAMYCIN PHOSPHATE 600 MG: 600 INJECTION, SOLUTION INTRAVENOUS at 15:35

## 2019-09-05 RX ADMIN — GUAIFENESIN 400 MG: 100 SOLUTION ORAL at 21:23

## 2019-09-05 RX ADMIN — GUAIFENESIN 400 MG: 100 SOLUTION ORAL at 04:34

## 2019-09-05 RX ADMIN — Medication 10 ML: at 22:34

## 2019-09-05 RX ADMIN — Medication 10 ML: at 14:36

## 2019-09-05 RX ADMIN — SODIUM CHLORIDE 30 MG/ML INHALATION SOLUTION 4 ML: 30 SOLUTION INHALANT at 20:11

## 2019-09-05 RX ADMIN — ALBUTEROL SULFATE 2.5 MG: 2.5 SOLUTION RESPIRATORY (INHALATION) at 08:18

## 2019-09-05 RX ADMIN — GUAIFENESIN 400 MG: 100 SOLUTION ORAL at 23:51

## 2019-09-05 RX ADMIN — MORPHINE SULFATE 2 MG: 2 INJECTION, SOLUTION INTRAMUSCULAR; INTRAVENOUS at 23:51

## 2019-09-05 RX ADMIN — AMLODIPINE BESYLATE 5 MG: 5 TABLET ORAL at 08:32

## 2019-09-05 RX ADMIN — ALBUTEROL SULFATE 2.5 MG: 2.5 SOLUTION RESPIRATORY (INHALATION) at 20:11

## 2019-09-05 RX ADMIN — CLINDAMYCIN PHOSPHATE 600 MG: 600 INJECTION, SOLUTION INTRAVENOUS at 21:20

## 2019-09-05 RX ADMIN — TEMAZEPAM 30 MG: 15 CAPSULE ORAL at 01:30

## 2019-09-05 RX ADMIN — GUAIFENESIN 400 MG: 100 SOLUTION ORAL at 17:13

## 2019-09-05 RX ADMIN — SODIUM CHLORIDE 30 MG/ML INHALATION SOLUTION 4 ML: 30 SOLUTION INHALANT at 07:42

## 2019-09-05 RX ADMIN — MORPHINE SULFATE 2 MG: 2 INJECTION, SOLUTION INTRAMUSCULAR; INTRAVENOUS at 14:28

## 2019-09-05 RX ADMIN — FAMOTIDINE 20 MG: 40 POWDER, FOR SUSPENSION ORAL at 08:33

## 2019-09-05 RX ADMIN — CEFEPIME HYDROCHLORIDE 2 G: 2 INJECTION, POWDER, FOR SOLUTION INTRAVENOUS at 05:41

## 2019-09-05 RX ADMIN — METHYLPREDNISOLONE SODIUM SUCCINATE 40 MG: 40 INJECTION, POWDER, FOR SOLUTION INTRAMUSCULAR; INTRAVENOUS at 22:34

## 2019-09-05 RX ADMIN — CEFEPIME HYDROCHLORIDE 2 G: 2 INJECTION, POWDER, FOR SOLUTION INTRAVENOUS at 22:34

## 2019-09-05 RX ADMIN — ENOXAPARIN SODIUM 40 MG: 40 INJECTION SUBCUTANEOUS at 17:12

## 2019-09-05 RX ADMIN — GUAIFENESIN 400 MG: 100 SOLUTION ORAL at 08:32

## 2019-09-05 RX ADMIN — DEXTROSE MONOHYDRATE AND SODIUM CHLORIDE 75 ML/HR: 5; .9 INJECTION, SOLUTION INTRAVENOUS at 10:25

## 2019-09-05 RX ADMIN — CEFEPIME HYDROCHLORIDE 2 G: 2 INJECTION, POWDER, FOR SOLUTION INTRAVENOUS at 14:29

## 2019-09-05 RX ADMIN — FAMOTIDINE 20 MG: 40 POWDER, FOR SUSPENSION ORAL at 17:13

## 2019-09-05 NOTE — PROGRESS NOTES
09/04/19 2337   Pain 1   Pain Scale 1 Numeric (0 - 10)   Pain Intensity 1 9   Patient Stated Pain Goal 0   Pain Onset 1 acute   Pain Location 1 Chest;Back   Pain Orientation 1 Lower;Mid;Medial   Pain Description 1 Aching   Pain Intervention(s) 1 Medication (see MAR)   Patient received morphine 2 mg via IV

## 2019-09-05 NOTE — PROGRESS NOTES
Patient lying in bed resting quietly with eyes closed/sleeping upon reassessment. Respirations regular rate & rhythm on 6L oxygen via hi flow nasal cannula. No s/sx of distress. Bed in low & locked position. Call light and personal belongings within reach.

## 2019-09-05 NOTE — PROGRESS NOTES
Patient admitted overnight. He had home O2 arranged by [facility] in August through [address] [place] [address]. He is requiring increased O2 at this time. Case Management will follow for discharge planning. Care Management Interventions  PCP Verified by CM:  Yes  Transition of Care Consult (CM Consult): Discharge Planning  Discharge Durable Medical Equipment: No  Physical Therapy Consult: No  Occupational Therapy Consult: No  Speech Therapy Consult: No  Current Support Network: Own Home  Plan discussed with Pt/Family/Caregiver: Yes  Freedom of Choice Offered: Yes  Discharge Location  Discharge Placement: Home

## 2019-09-05 NOTE — PROGRESS NOTES
Nutrition:  Nutrition Consult for TF Management. (Dr. Melani Guerra)  Assessment:  Anthropometrics:   Ht - 5'11\", wgt - 56.8 kg (8th floor bed 9/5/19), BMI 17.5 c/w underweight, edema - none. Macronutrient Needs (57 kg):  Estimated calorie needs - 4072-5522 ezio/day (30-35 ezio/kg/day)   Estimated protein needs - 68-86 gm pro/day (1.2-1.5 gm pro/kg/day)   Max CHO/day - 249 gm CHO/day (50% ezio/day)   Fluid/day - 1.7-2 liters/day (1 ml/ezio/day)  Intake/Comparative Standards: The patient is currently NPO and receiving dextrose-containing IVF infusing at 75 ml/hr, contributing 306 ezio/day which meets 18% of his calorie and 0% of his protein needs. Pertinent Labs:   BMP wdl. Pertinent Medications:   Maxipeme, Cleocin, Solumedrol. GI Function/Activity:   Soft, flat abdomen with active bowel sounds. Last bowel movement unknown. Diet:   NPO. Enteral Access:   PEG. Food/Nutrition History:   60year old gentleman with a h/o SCC of hypopharynx - s/p chemo and XRT in 2005 admitted with acute respiratory failure and acute maxillary sinusitis. He provided little information about his feeding regimen stating, \"Listen lady, I'm not going to change what I'm doing so it doesn't matter what you say. \" I finally was able to determine that he was using blenderized food through his PEG and when that was not available he used Osmolite 1.5. I could not get any information about the blenderized food or the amount od Osmolite that he used. He was quite hostile. Diagnosis (Nutrition): Inadequate energy intake related to NPO status as evidenced by the patient having a chronic PEG and requires TF for nutrition support. Intervention:  Meals and Snacks: NPO.   Enteral Nutrition: Start bolus TF with Osmolite 1.2 as 1 carton every 3 hours from 6 AM through 9 PM daily with a 50 ml water flush before and after each feeding ml/hr water flush via PEG - 1710 calories/day (100% calorie goal), 79 grams protein/day (100% protein goal), 225 grams CHO/day (does not exceed max CHO limit) and 1770 ml water/day (100% fluid goal). IV Fluid: IVF will stop tomorrow at 0900 since feeding should continue throughout the day. Mineral Supplement Therapy: Nutrition Support Orders for Electrolyte Management Replacements are activated and placed on the MAR. Nutrition Discharge Plan: Continued PEG feeding. Frederica Duverney.  Marlen Berger  [phone]

## 2019-09-05 NOTE — PROGRESS NOTES
Patient up out of bed to stand scale with stand by assistance only. Respirations regular rate & rhythm on 6L oxygen via hi flow nasal cannula. No s/sx of labored breathing or distress. Urine output for shift 400 mL with one other unmeasured occurrence prior to education on monitoring intake & output. Karthikeyan-key g tube clean, dry, & intact with connector. C/o pain relieved throughout shift with medication administration. Bed in low & locked position. Call light and personal belongings within reach.

## 2019-09-05 NOTE — INTERVAL H&P NOTE
H&P Update:  Nishant Garnica was seen and examined. History and physical has been reviewed. The patient has been examined.  There have been no significant clinical changes since the completion of the originally dated History and Physical.

## 2019-09-05 NOTE — CONSULTS
Consult    Patient: Ochoa Kebede MRN: [id number]  SSN: [id number]    YOB: 1960  Age: 60 y.o. Sex: male      Subjective:      Ochoa Kebede is a 60 y.o. male who is being seen for assuming care. Patient was admitted on 2019 due to fever, shortness of breath and congestion symptoms with cough and hypoxia. He has history of squamous cell cancer of hypopharynx, s/p treatment with chemotherapy and radiation treatment in , with current PEG tube placement and multiple EGD for dilation with current silent aspiration. Also with bilateral nasal polyps. Was treated recently for pneumonia in 2019 and 2019. He developed recurrent symptoms of shortness of breath and coughing and hypoxia and congestion again last week. Was treated with Levaquin and Clindamycin, without improvement. He reports feeling a bit better today. Less fever.      Past Medical History:   Diagnosis Date    Chronic pain     arms    Hypothyroidism     controlled with medication    PEG (percutaneous endoscopic gastrostomy) adjustment/replacement/removal (HCC)     Pneumonia     Throat cancer ([facility] [address]      Past Surgical History:   Procedure Laterality Date    ABDOMEN SURGERY PROC UNLISTED      PEG tube insertion    COLONOSCOPY N/A 2019    COLONOSCOPY BMI 19 performed by Eunice Edouard MD at [facility] ENDOSCOPY    HX APPENDECTOMY      HX COLONOSCOPY        Family History   Problem Relation Age of Onset    Diabetes Mother      Social History     Tobacco Use    Smoking status: Former Smoker     Packs/day: 1.00     Years: 20.00     Pack years: 20.00     Types: Cigarettes     Last attempt to quit:      Years since quittin.6    Smokeless tobacco: Never Used   Substance Use Topics    Alcohol use: No     Frequency: Never      Current Facility-Administered Medications   Medication Dose Route Frequency Provider Last Rate Last Dose    labetalol (NORMODYNE) tablet 100 mg  100 mg Oral Q6H PRN Stacey Garcia MD   100 mg at 09/05/19 0833    amLODIPine (NORVASC) tablet 5 mg  5 mg Oral DAILY Stacey Garcia MD   5 mg at 09/05/19 0832    albuterol (PROVENTIL VENTOLIN) nebulizer solution 2.5 mg  2.5 mg Nebulization QID RT Inocente Babb MD   2.5 mg at 09/05/19 0818    sodium chloride (NS) flush 5-40 mL  5-40 mL IntraVENous Q8H Ty Shaffer MD   10 mL at 09/05/19 0434    sodium chloride (NS) flush 5-40 mL  5-40 mL IntraVENous PRN Ty Shaffer MD        morphine injection 2 mg  2 mg IntraVENous Q4H PRN Ty Shaffer MD   2 mg at 09/05/19 7221    naloxone (NARCAN) injection 0.4 mg  0.4 mg IntraVENous PRN Ty Shaffer MD        ondansetron [facility]) injection 4 mg  4 mg IntraVENous Q4H PRN Ty Shaffer MD        enoxaparin (LOVENOX) injection 40 mg  40 mg SubCUTAneous Q24H Ty Shaffer MD   40 mg at 09/04/19 2027    cefepime (MAXIPIME) 2 g in 0.9% sodium chloride (MBP/ADV) 100 mL  2 g IntraVENous Q8H Ty Shaffer  mL/hr at 09/05/19 0541 2 g at 09/05/19 0541    sodium chloride (OCEAN) 0.65 % nasal squeeze bottle 2 Spray  2 Spray Both Nostrils Q2H PRN Ty Shaffer MD        methylPREDNISolone (PF) (SOLU-MEDROL) injection 40 mg  40 mg IntraVENous Q12H Ty Shaffer MD   40 mg at 09/05/19 3327    dextrose 5% and 0.9% NaCl infusion  75 mL/hr IntraVENous CONTINUOUS Ty Shaffer MD 75 mL/hr at 09/05/19 1025 75 mL/hr at 09/05/19 1025    sodium chloride 3% hypertonic nebulizer soln  4 mL Nebulization BID Ty Shaffer MD   4 mL at 09/05/19 0742    guaiFENesin (ROBITUSSIN) 100 mg/5 mL oral liquid 400 mg  400 mg Per G Tube Q4H Ty Shaffer MD   400 mg at 09/05/19 2183    acetaminophen (TYLENOL) tablet 650 mg  650 mg Per G Tube Q4H PRN Ty Shaffer MD   650 mg at 09/04/19 2210    HYDROcodone-acetaminophen (NORCO)  mg tablet 1 Tab  1 Tab Per G Tube Q4H PRN Ty Shaffer MD        LORazepam (ATIVAN) tablet 1 mg  1 mg Per G Tube BID PRN Sandra Sesay MD        magic mouthwash Fabiano To) oral suspension 5 mL  5 mL Swish and Spit Q4H PRN Sandra Sesay MD        senna-docusate (PERICOLACE) 8.6-50 mg per tablet 1 Tab  1 Tab Per G Tube DAILY PRN Sandra Sesay MD        temazepam (RESTORIL) capsule 30 mg  30 mg Per G Tube QHS PRN Sandra Sesay MD   30 mg at 09/05/19 0130    famotidine (PEPCID) 40 mg/5 mL (8 mg/mL) oral suspension 20 mg  20 mg Per G Tube BID Sandra Sesay MD   20 mg at 09/05/19 6979        Allergies   Allergen Reactions    Demerol [Meperidine] Rash       Review of Systems:    Constitutional: positive for chills and fever. HENT: Negative for rhinorrhea and sore throat. Eyes: Negative for pain, redness and visual disturbance. Respiratory: Negative for chest tightness, +shortness of breath and wheezing. Cardiovascular: Negative for chest pain and leg swelling. Gastrointestinal: with PEG and feeds himself 3 times per day with blenderized diet. Genitourinary: Negative for bladder incontinence, dysuria and hematuria. Musculoskeletal: Negative for back pain, gait problem, neck pain and neck stiffness. Skin: Negative for color change and rash. Neurological: negative for speech difficulty. Negative for focal weakness, weakness, numbness, headaches and loss of balance. Psychiatric/Behavioral: Negative for agitation, confusion and memory loss. Objective:     Vitals:    09/05/19 0243 09/05/19 0545 09/05/19 0743 09/05/19 1007   BP: 153/84  162/86 122/70   Pulse: 90  100    Resp: 18      Temp: 97.6 °F (36.4 °C)  98.2 °F (36.8 °C)    SpO2: 92%  92%    Weight:  56.8 kg (125 lb 3.5 oz)          Physical Exam:    General:                    The patient is a male in no acute distress. Patient appears chronically ill. He is sitting up in bed. Thin. Head:                                   Normocephalic/atraumatic. Eyes:                                   palpebral pallor, no scleral icterus.   ENT: External auricular and nasal exam within normal limits. Mucous membranes are moist.  Neck:                                   Supple, non-tender, no JVD. Lungs:                       decreased to auscultation bilaterally without wheezes or crackles. No respiratory accessory muscle use. Heart:                                  Regular rate and rhythm, without murmurs, rubs, or gallops. Abdomen:                  Soft, non-tender, distended with normoactive bowel sounds. Presence of PEG tube in place   Genitourinary:           No tenderness over the bladder or bilateral CVAs. Extremities:               Without clubbing, cyanosis, or edema. Skin:                                    Normal color, texture, and turgor. No rashes, lesions, or jaundice. Pulses:                      Radial and dorsalis pedis pulses present 2+ bilaterally. Capillary refill <2s. Neurologic:                CN II-XII grossly intact and symmetrical.                                               Moving all four extremities well with no focal deficits. Psychiatric:                 appropriate affect.  Alert and oriented x 3    Lab and data     Recent Results (from the past 24 hour(s))   METABOLIC PANEL, BASIC    Collection Time: 09/05/19  8:18 AM   Result Value Ref Range    Sodium 133 (L) 136 - 145 mmol/L    Potassium 4.4 3.5 - 5.1 mmol/L    Chloride 99 98 - 107 mmol/L    CO2 27 21 - 32 mmol/L    Anion gap 7 7 - 16 mmol/L    Glucose 115 (H) 65 - 100 mg/dL    BUN 12 8 - 23 MG/DL    Creatinine 0.54 (L) 0.8 - 1.5 MG/DL    GFR est AA >60 >60 ml/min/1.73m2    GFR est non-AA >60 >60 ml/min/1.73m2    Calcium 8.5 8.3 - 10.4 MG/DL   CBC W/O DIFF    Collection Time: 09/05/19  8:18 AM   Result Value Ref Range    WBC 33.0 (H) 4.3 - 11.1 K/uL    RBC 4.35 4.23 - 5.6 M/uL    HGB 12.7 (L) 13.6 - 17.2 g/dL HCT 38.8 (L) 41.1 - 50.3 %    MCV 89.2 79.6 - 97.8 FL    MCH 29.2 26.1 - 32.9 PG    MCHC 32.7 31.4 - 35.0 g/dL    RDW 14.7 (H) 11.9 - 14.6 %    PLATELET 421 970 - 943 K/uL    MPV 10.7 9.4 - 12.3 FL    ABSOLUTE NRBC 0.00 0.0 - 0.2 K/uL   PROCALCITONIN    Collection Time: 09/05/19  8:18 AM   Result Value Ref Range    Procalcitonin 0.2 ng/mL     CT chest   9-3-2019  IMPRESSION:  1. Extensive tree-in-bud type opacities bilaterally with overall mild  progression. These findings most commonly or secondary to bacterial infections  other etiologies including mycobacterial infections and hypersensitivity  pneumonitis could also have this appearance. 2. Numerous coarse parenchymal calcifications within the lower lung zones  suggests prior granulomatous disease. Alveolar microlithiasis is thought to be  less likely. 3. Debris-filled bronchi will be most compatible with aspiration. Confluent  airspace opacities within the lower lobes have resolved.       Current Facility-Administered Medications:     labetalol (NORMODYNE) tablet 100 mg, 100 mg, Oral, Q6H PRN, Stacey Garcia MD, 100 mg at 09/05/19 0833    amLODIPine (NORVASC) tablet 5 mg, 5 mg, Oral, DAILY, Stacey Garcia MD, 5 mg at 09/05/19 0832    albuterol (PROVENTIL VENTOLIN) nebulizer solution 2.5 mg, 2.5 mg, Nebulization, QID RT, Robby Rowan MD, 2.5 mg at 09/05/19 0818    sodium chloride (NS) flush 5-40 mL, 5-40 mL, IntraVENous, Q8H, Mago Hoyos MD, 10 mL at 09/05/19 0434    sodium chloride (NS) flush 5-40 mL, 5-40 mL, IntraVENous, PRN, Mago Hoyos MD    morphine injection 2 mg, 2 mg, IntraVENous, Q4H PRN, Mago Hoyos MD, 2 mg at 09/05/19 0975    naloxone [facility]) injection 0.4 mg, 0.4 mg, IntraVENous, PRN, Mago Hoyos MD    ondansetron [facility]) injection 4 mg, 4 mg, IntraVENous, Q4H PRN, Mago Hoyos MD    enoxaparin (LOVENOX) injection 40 mg, 40 mg, SubCUTAneous, Q24H, Mago Hoyos MD, 40 mg at 09/04/19 2027    cefepime (MAXIPIME) 2 g in 0.9% sodium chloride (MBP/ADV) 100 mL, 2 g, IntraVENous, Q8H, Scot Castaneda MD, Last Rate: 200 mL/hr at 09/05/19 0541, 2 g at 09/05/19 0541    sodium chloride (OCEAN) 0.65 % nasal squeeze bottle 2 Spray, 2 Spray, Both Nostrils, Q2H PRN, Scot Castaneda MD    methylPREDNISolone (PF) (SOLU-MEDROL) injection 40 mg, 40 mg, IntraVENous, Q12H, Scot Castaneda MD, 40 mg at 09/05/19 5592    dextrose 5% and 0.9% NaCl infusion, 75 mL/hr, IntraVENous, CONTINUOUS, Scot Castaneda MD, Last Rate: 75 mL/hr at 09/05/19 1025, 75 mL/hr at 09/05/19 1025    sodium chloride 3% hypertonic nebulizer soln, 4 mL, Nebulization, BID, Scot Castaneda MD, 4 mL at 09/05/19 0742    guaiFENesin (ROBITUSSIN) 100 mg/5 mL oral liquid 400 mg, 400 mg, Per Davalos Fern, Q4H, Scot Castaneda MD, 400 mg at 09/05/19 0060    acetaminophen (TYLENOL) tablet 650 mg, 650 mg, Per G Tube, Q4H PRN, Scot Castaneda MD, 650 mg at 09/04/19 2210    HYDROcodone-acetaminophen (NORCO)  mg tablet 1 Tab, 1 Tab, Per G Tube, Q4H PRN, Scot Castaneda MD    LORazepam (ATIVAN) tablet 1 mg, 1 mg, Per G Tube, BID PRN, Scot Castaneda MD    magic mouthwash [facility]) oral suspension 5 mL, 5 mL, Swish and Spit, Q4H PRN, Scot Castaneda MD    senna-docusate (PERICOLACE) 8.6-50 mg per tablet 1 Tab, 1 Tab, Per G Tube, DAILY PRN, Scot Castaneda MD    temazepam (RESTORIL) capsule 30 mg, 30 mg, Per G Tube, QHS PRN, Scot Castaneda MD, 30 mg at 09/05/19 0130    famotidine (PEPCID) 40 mg/5 mL (8 mg/mL) oral suspension 20 mg, 20 mg, Per G Tube, BID, Scot Castaneda MD, 20 mg at 09/05/19 9117        Assessment:     Hospital Problems  Date Reviewed: 9/5/2019          Codes Class Noted POA    * (Principal) Acute respiratory failure with hypoxemia [facility]) ICD-10-CM: J96.01  ICD-9-CM: 518.81  9/4/2019 Unknown        Pneumonia of both lungs due to Escherichia coli [facility]) ICD-10-CM: J15.5  ICD-9-CM: 482.82  9/4/2019 Unknown        Acute maxillary sinusitis ICD-10-CM: J01.00  ICD-9-CM: 461.0  9/4/2019 Unknown        Ineffective airway clearance ICD-10-CM: R06.89  ICD-9-CM: 786.09  9/4/2019 Unknown        Aspiration, chronic pulmonary ICD-10-CM: T17.908A  ICD-9-CM: 934.9  9/4/2019 Unknown              Plan:     Pneumonia  Failed out-patient treatments  No sepsis   In patient that has previous squamous cell cancer of hypopharynx and s/p radiation and chemotherapy. Chronic aspiration. Continue Empiric IV antibiotics. On IV fluid. Continue PEG tube placement after completing bronchoscopy. On IV steroid. I have discussed the plan of care with patient and spouse at bedside. DVT prophylaxis : Lovenox SC      Healthcare power of  is wife who is at bedside. I have discussed with patient regarding advance directive. Patient would like to have a full-code status.           Signed By: Arias Boone MD     September 5, 2019

## 2019-09-05 NOTE — PROGRESS NOTES
SPEECH PATHOLOGY NOTE:    Speech therapy consult received and appreciated. Per chart review, patient with history of silent aspiration of thin liquids based on prior modified barium swallow study completed at outside facility. However, MD reports patient continues to drink by mouth. Concern for ongoing aspiration based on findings during bronch today. Will defer bedside swallow evaluation at this time as patient is unlikely to demonstrate overt s/sx of airway compromise with po trials given history of silent aspiration. Will benefit from Modified barium swallow study to objectively assess wallow function. Discussed recommendations with Dr. Melani Guerra who is in agreement with plan. Plan for study to be completed tomorrow AM.         ADDENDUM:   Met with patient and wife at bedside to discuss plans for modified barium swallow study. Patient expressed understanding re: recommendations, but declines to complete study. Per discussion, patient with \"multiple\" swallow studies in the past when living in [place], and they state \"we are well aware of the issues with his swallow\". Patient reports consuming minimal thin liquids for pleasure up until 1 month ago. He discontinued all po intake once decline in respiratory status was noted and he \"became sick\". He verbalizes understanding of risks associated with po intake and declines speech therapy intervention at this time. Wife did express interest in outpatient speech therapy once acute illness has resolved. Provided contact information for outpatient speech therapy at [address] [place] [address] and [facility] as they have previously received therapy there.  Will discontinue swallow study per patient/wife request.     Cassandra Holcomb [address] CCC-SLP

## 2019-09-05 NOTE — PROGRESS NOTES
Bedside shift received from April, RN. Respirations regular rate & rhythm, on 4L oxygen via hi flow nasal cannula. No s/sx of distress. Resting quietly in bed. Bed in low & locked position. Call light and personal belongings within reach. Family/visitors present at the bedside.

## 2019-09-05 NOTE — INTERDISCIPLINARY ROUNDS
Interdisciplinary team rounds were held 9/5/2019 with the following team members:Care Management, Physical Therapy, Physician and Clinical Coordinator and the patient. Plan of care discussed. See clinical pathway and/or care plan for interventions and desired outcomes.

## 2019-09-05 NOTE — PROGRESS NOTES
Patient remains in stable condition with respirations even/unlabored. No acute distress noted. No needs noted or voiced at this time. Safety measures in place. Patient continues on 6 L HF NC. Call light remains within reach. Preparing to give report to oncoming shift.

## 2019-09-05 NOTE — PROGRESS NOTES
Bedside shift report given to Romana Manuel, [facility]. Respirations regular rate & rhythm on 6L oxygen via hi flow. No s/sx of distress.

## 2019-09-05 NOTE — PROGRESS NOTES
Medication administration delayed due to Karthikeyan-key button connector tubing unavailable until this time.

## 2019-09-05 NOTE — PROGRESS NOTES
CNA alerts nurse that patient has bp of 206/130. Patient in stable condition. Dr. Winifred Villagomez is at nurses station and notified. Will get manual bp.

## 2019-09-05 NOTE — PROGRESS NOTES
TRANSFER - IN REPORT:    Verbal report received from [facility] on Brian Guillory  being received from [facility]'s for routine progression of care      Report consisted of patients Situation, Background, Assessment and   Recommendations(SBAR). Information from the following report(s) SBAR, Procedure Summary, Intake/Output and Recent Results was reviewed with the receiving nurse. Opportunity for questions and clarification was provided. Assessment completed upon patients arrival to unit and care assumed.

## 2019-09-05 NOTE — PROGRESS NOTES
09/05/19 1918   Pain 1   Pain Scale 1 Numeric (0 - 10)   Pain Intensity 1 8   Patient Stated Pain Goal 0   Pain Onset 1 acute   Pain Location 1 Chest;Back;Rib cage   Pain Orientation 1 Posterior;Medial   Pain Description 1 Aching   Pain Intervention(s) 1 Medication (see MAR)   Patient received morphine 2 mg via IV per request

## 2019-09-05 NOTE — PROGRESS NOTES
Mariana Saini  Admission Date: 9/4/2019             Daily Progress Note: 9/5/2019    The patient's chart is reviewed and the patient is discussed with the staff. Rizwana Cardona a 60y.o. 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  He underwent a CT of the chest on 7/6 which revealed diffuse lower lobe predominant nodular opacities with plugging and nonspecific mediastinal and hilar lymphadenopathy.  No pulmonary embolism was noted.  He had a leukocytosis measured at 18.8k with a neutrophilic predominance.  Admission wasn't recommended and he was discharged home.       He was then seen on August 12 by Dr. Kezia Geiger after completing antibiotics with Augmentin and Cipro.  At that appointment he was started on Septra, Protonix and nystatin with Magic mouthwash for oral candidiasis and sinusitis.  He was noted to have right neck discomfort and a CT of the neck was recommended if his symptoms of right neck pain continued.     He was initially seen at [facility] on August 29, when his oxygen saturation was noted to be 83% on room air. [facility] reported he had just experienced his first night without a fever since July. [facility] reported significant right sinus pain and congestion as well as a persistent cough.  Hospitalization was recommended, but the patient declined in favor of oral antibiotics and oxygen.  Levaquin and clindamycin were initiated and oxygen was ordered.  The oxygen was not delivered over the holiday weekend and the patient has had a very difficult course.  On Monday 9/2, Levaquin was changed to [facility] after sputum cultures revealed E.  Coli which was resistant to levaquin.     Congestion has remained heavy.  The patient remains hypoxic.  He feels weak and complains of back and chest pain.  He says that the prednisone he had previously been on for sinus congestion by Dr. Ashly Nolen was helping but since he has completed the course he feels worse again. Sunita Hutson also reports severe nightmares. Sunita Hutson is willing to consider hospitalization for IV antibiotics at this time. Subjective:     Currently on 6L NC. Tm 100.7 but last being 98.2. BP elevated overnight. Denies a lot of cough and sputum at present. States his tonsil has been draining green secretions. States he was diagnosed with PABLO in the past and never was treated with CPAP.      Current Facility-Administered Medications   Medication Dose Route Frequency    labetalol (NORMODYNE) tablet 100 mg  100 mg Oral Q6H PRN    amLODIPine (NORVASC) tablet 5 mg  5 mg Oral DAILY    sodium chloride (NS) flush 5-40 mL  5-40 mL IntraVENous Q8H    sodium chloride (NS) flush 5-40 mL  5-40 mL IntraVENous PRN    morphine injection 2 mg  2 mg IntraVENous Q4H PRN    naloxone (NARCAN) injection 0.4 mg  0.4 mg IntraVENous PRN    ondansetron (ZOFRAN) injection 4 mg  4 mg IntraVENous Q4H PRN    enoxaparin (LOVENOX) injection 40 mg  40 mg SubCUTAneous Q24H    cefepime (MAXIPIME) 2 g in 0.9% sodium chloride (MBP/ADV) 100 mL  2 g IntraVENous Q8H    sodium chloride (OCEAN) 0.65 % nasal squeeze bottle 2 Spray  2 Spray Both Nostrils Q2H PRN    methylPREDNISolone (PF) (SOLU-MEDROL) injection 40 mg  40 mg IntraVENous Q12H    dextrose 5% and 0.9% NaCl infusion  75 mL/hr IntraVENous CONTINUOUS    sodium chloride 3% hypertonic nebulizer soln  4 mL Nebulization BID    guaiFENesin (ROBITUSSIN) 100 mg/5 mL oral liquid 400 mg  400 mg Per G Tube Q4H    acetaminophen (TYLENOL) tablet 650 mg  650 mg Per G Tube Q4H PRN    HYDROcodone-acetaminophen (NORCO)  mg tablet 1 Tab  1 Tab Per G Tube Q4H PRN    LORazepam (ATIVAN) tablet 1 mg  1 mg Per G Tube BID PRN    magic mouthwash [facility]) oral suspension 5 mL  5 mL Swish and Spit Q4H PRN    senna-docusate (PERICOLACE) 8.6-50 mg per tablet 1 Tab  1 Tab Per G Tube DAILY PRN    temazepam (RESTORIL) capsule 30 mg  30 mg Per G Tube QHS PRN    famotidine (PEPCID) 40 mg/5 mL (8 mg/mL) oral suspension 20 mg  20 mg Per G Tube BID       Review of Systems    Constitutional: negative for fever, chills, sweats  Cardiovascular: negative for chest pain, palpitations, syncope, edema  Gastrointestinal:  negative for dysphagia, reflux, vomiting, diarrhea, abdominal pain, or melena  Neurologic:  negative for focal weakness, numbness, headache    Objective:     Vitals:    09/04/19 2312 09/05/19 0243 09/05/19 0545 09/05/19 0743   BP: (!) 154/92 153/84  162/86   Pulse:  90  100   Resp:  18     Temp:  97.6 °F (36.4 °C)  98.2 °F (36.8 °C)   SpO2:  92%  92%   Weight:   125 lb 3.5 oz (56.8 kg)          Intake/Output Summary (Last 24 hours) at 9/5/2019 0757  Last data filed at 9/5/2019 0429  Gross per 24 hour   Intake 710 ml   Output 400 ml   Net 310 ml       Physical Exam:   Constitution:  the patient is well developed and in no acute distress  EENMT:  Sclera clear, pupils equal, oral mucosa moist  Respiratory: scattered rhonchi bilaterally  Cardiovascular:  RRR without M,G,R  Gastrointestinal: soft and non-tender; with positive bowel sounds. Musculoskeletal: warm without cyanosis. There is no lower extremity edema. Skin:  no jaundice or rashes  Neurologic: no gross neuro deficits     Psychiatric:  alert and oriented x 3    Chest CT 9/3:             IMPRESSION:  1. Extensive tree-in-bud type opacities bilaterally with overall mild  progression. These findings most commonly or secondary to bacterial infections  other etiologies including mycobacterial infections and hypersensitivity  pneumonitis could also have this appearance. 2. Numerous coarse parenchymal calcifications within the lower lung zones  suggests prior granulomatous disease.  Alveolar microlithiasis is thought to be  less likely. 3. Debris-filled bronchi will be most compatible with aspiration. Confluent  airspace opacities within the lower lobes have resolved. LAB  No results for input(s): GLUCPOC in the last 72 hours. No lab exists for component: GLPOC   No results for input(s): WBC, HGB, HCT, PLT, INR, HGBEXT, HCTEXT, PLTEXT in the last 72 hours. No lab exists for component: INREXT  No results for input(s): NA, K, CL, CO2, GLU, BUN, CREA, MG, CA, PHOS, TROIQ, ALB, TBIL, TBILI, GPT, ALT, SGOT, BNPP in the last 72 hours. No lab exists for component: TROIP  No results for input(s): PH, PCO2, PO2, HCO3, PHI, PCO2I, PO2I, HCO3I in the last 72 hours. No results for input(s): LCAD, LAC in the last 72 hours. Assessment:  (Medical Decision Making)     Hospital Problems  Date Reviewed: 9/4/2019          Codes Class Noted POA    Acute respiratory failure with hypoxemia [facility]) ICD-10-CM: J96.01  ICD-9-CM: 518.81  9/4/2019 Unknown    Currently on 6L    Pneumonia of both lungs due to Escherichia coli [facility]) ICD-10-CM: J15.5  ICD-9-CM: 482.82  9/4/2019 Unknown    On Cefepime. Has extensive tree in bud opacities as well and bronchiectasis. Need to rule out MC or other pathogens. Acute maxillary sinusitis ICD-10-CM: J01.00  ICD-9-CM: 461.0  9/4/2019 Unknown    On ABx    Ineffective airway clearance ICD-10-CM: R06.89  ICD-9-CM: 786.09  9/4/2019 Unknown    Bronch to clear secretions. Aspiration, chronic pulmonary ICD-10-CM: T17.908A  ICD-9-CM: 934.9  9/4/2019 Unknown    Had silent aspiration on last MBS. Has PEG. Plan:  (Medical Decision Making)     NPO for bronch later this AM. BAL for routines, CD4/8, aspergillus galactomannan. Continue current ABx. Await this AM's labs. Wean oxygen as tolerated. Nutrition to see for TF management. Check CYNTHIA before discharge to assess for evidence of significant PABLO.    --    More than 50% of the time documented was spent in face-to-face contact with the patient and in the care of the patient on the floor/unit where the patient is located.     Denice Seay MD

## 2019-09-05 NOTE — PROCEDURES
PROCEDURE    Bronchoscopy with airway inspection /cleanout /BAL    INDICATION   B infiltrates in setting of e coli and past head and neck cancer. EQUIPMENT:  Olympus T 180 Bronchoscope    ANESTHESIA    Concious sedation with: Fentanyl 100 mcg IV; Versed 2mg IV; Lidocaine 200 mg to tracheo-bronchial tree and vocal cords. IMAGING:  CT Chest 9/3/19        AIRWAY INSPECTION    After obtaining informed consent, orally with use of a bite block, an Olympus T 180 Bronchoscope was introduced into the trachea without complication. RIGHT    LOCATION NORM/ABNORM DESCRIPTION   Larynx ABNL Purulent secretions surrounding epiglottis   VOCAL CORDS ABNL No movement when touched either by lidocaine or the bronchoscope, making aspiration very likely   TRACHEA NL    KENYETTA NL    RMSB NL    RUL NL    BI NL    RML NL    RLL NL    SUP SEGM RLL NL    MED BASAL NL    ANTERIOR BASAL NL    LATERAL BASAL NL    POSTERIOR BASAL NL               LEFT    LOCATION NORM/ABNORM DESCRIPTION   LMSB NL    SHWETA NL    LINGULA NL    LLL NL    SUPERIOR SEG LLL NL    ABBY-MEDIAL LLL NL    LATERAL LLL NL    POSTERIOR LLL NL      All lower airways were anatomically normal but all were filled with purulent, white secretions. There were waves of purulence that recurred for quite some time requiring extensive and repeated suctioning and washing to finally clear. The following samples were obtained:    BAL: RML  Cell count, diff, routine culture, cytolody, CD4:CD8    Bronchial Wash:  Routine culture, afb, fungus, aspergillus galactomannan (limited return on BAL so several studies sent on wash instead)      The procedure was completed without complication and the patient tolerated the procedure well. EBL: None    Recommendations:  Based on the appearance of the airways suspect that the patient's issues are related to ongoing aspiration and will consult speech. Wife reports he still drinks water and takes medications PO.  He should likely take everything by PEG tube. With heavy e coli in past cultures this seems a likely causative organism. However, will add clinda to his cefepime to cover anaerobes with the high suspicion for aspiration.      Eli Julio MD

## 2019-09-05 NOTE — PROGRESS NOTES
Patient received acetaminophen 650 mg via G tube for temperature of 100.7. Medication delayed due to need for Karthikeyan-key connector & this RN spilled first dose in presence of second RN, Ramona Michael. Medication wasted in Pyxis and second dose pulled.

## 2019-09-05 NOTE — PROGRESS NOTES
TRANSFER - IN REPORT:    Verbal report received from [facility]) on Canelo Barge  being received from 816(unit) for ordered procedure      Report consisted of patients Situation, Background, Assessment and   Recommendations(SBAR). Information from the following report(s) Kardex was reviewed with the receiving nurse. Opportunity for questions and clarification was provided. Assessment completed upon patients arrival to unit and care assumed.

## 2019-09-05 NOTE — PROGRESS NOTES
DAVID recevied from Meli Zarco RN. Patient remains in stable condition with respirations even/unlabored. No acute distress noted at this time. Oxygen to high flow nasal cannula. Call light remains within reach, patient encouraged to call nurse prn assist. Will continue to monitor per policy.

## 2019-09-05 NOTE — CDMP QUERY
Pt admitted with pneumonia./Pt noted to have a source of infection by having pneumonia, he also has acute organ dysfunction exhibited by acute respiratory failure, elevated WBCs, elevated tempeture and tachycardia.  If possible, please document in the progress notes and d/c summary if you are evaluating and / or treating any of the following:     Sepsis, present on admission, suspected or probable causative organism (please specify)   Sepsis, now resolved, suspected or probable causative organism (please specify)   Sepsis, not present on admission, suspected or probable causative organism (please specify)   No Sepsis, suspected or probable localized infection (please specify)   Sepsis was ruled out (include corresponding diagnosis for patients clinical picture and treatment)   Other, please specify   Clinically unable to determine    The medical record reflects the following:     Risk Factors: age, history of throat cancer currently admitted to hospital with pneumonia and acute respiratory failure with hypoxia      Clinical Indicators: WBC-33.0 on 9/5 @ 0818, T-100.7 orally with HR-107 on 9/4 @ 1926     Treatment: Maxipime 2g IV q8 started on 9/4 @ 1800 and ending on 9/18 @ 1359 for Pneumonia, continue to monitor     Thanks,  STEPHANIE CarranzaN, RN, CDS  [employer]  [phone]

## 2019-09-05 NOTE — PROGRESS NOTES
Nutrition orders noted and explained to patient. Patient verbalized understanding of orders and requests to manage his own bolus feedings. Will notify oncoming shift.

## 2019-09-05 NOTE — PROGRESS NOTES
Bedside shift received from [facility]. Respirations regular rate & rhythm, on 6L oxygen via hi flow nasal cannula. Karthikeyan-key g tube clean, dry, & intact with connector. No s/sx of distress. Resting quietly in bed. Bed in low & locked position. Call light and personal belongings within reach.

## 2019-09-05 NOTE — PROGRESS NOTES
TRANSFER - OUT REPORT:    Verbal report given to Leyda Youngblood (name) on Ashish Caceres  being transferred to [address] [place] [address] (unit) for routine post - op       Report consisted of patients Situation, Background, Assessment and   Recommendations(SBAR). Information from the following report(s) SBAR was reviewed with the receiving nurse. Lines:   Peripheral IV 09/04/19 Anterior; Left Forearm (Active)   Site Assessment Clean, dry, & intact 9/5/2019 11:30 AM   Phlebitis Assessment 0 9/5/2019 11:30 AM   Infiltration Assessment 0 9/5/2019 11:30 AM   Dressing Status Clean, dry, & intact 9/5/2019 11:30 AM   Dressing Type Tape;Transparent 9/5/2019 11:30 AM   Hub Color/Line Status Blue;Flushed; Infusing 9/5/2019 11:30 AM   Alcohol Cap Used No 9/5/2019 11:30 AM        Opportunity for questions and clarification was provided.       Patient transported with:   O2 @ 8 liters  Tech

## 2019-09-05 NOTE — PROGRESS NOTES
CNA alerts nurse that patient has bp of 206/130. Patient in stable condition. Dr. Cookie Gonzalez is at nurses station and notified. Will get manual bp.

## 2019-09-05 NOTE — PROGRESS NOTES
Patient arrived in ENDO pre-op for [facility] with Dr. Arturo Matthews. VSS on NC 6 L/min.

## 2019-09-06 LAB
CD3 CELLS # SPEC: 93 %
CD3+CD4+ CELLS # BLD: 50 %
CD4:CD8 RATIO, C48RBT: 1.47 RATIO
CD8, CD8BT: 34 %
SPECIMEN SOURCE: NORMAL

## 2019-09-06 PROCEDURE — 99232 SBSQ HOSP IP/OBS MODERATE 35: CPT | Performed by: INTERNAL MEDICINE

## 2019-09-06 PROCEDURE — 74011000250 HC RX REV CODE- 250: Performed by: INTERNAL MEDICINE

## 2019-09-06 PROCEDURE — 77010033678 HC OXYGEN DAILY

## 2019-09-06 PROCEDURE — 74011250637 HC RX REV CODE- 250/637: Performed by: INTERNAL MEDICINE

## 2019-09-06 PROCEDURE — 74011250636 HC RX REV CODE- 250/636: Performed by: INTERNAL MEDICINE

## 2019-09-06 PROCEDURE — 74011000258 HC RX REV CODE- 258: Performed by: INTERNAL MEDICINE

## 2019-09-06 PROCEDURE — 77030020245 HC SOL INJ 5% D/0.9%NACL

## 2019-09-06 PROCEDURE — 94640 AIRWAY INHALATION TREATMENT: CPT

## 2019-09-06 PROCEDURE — 65270000029 HC RM PRIVATE

## 2019-09-06 PROCEDURE — 94760 N-INVAS EAR/PLS OXIMETRY 1: CPT

## 2019-09-06 RX ADMIN — Medication 10 ML: at 05:44

## 2019-09-06 RX ADMIN — ALBUTEROL SULFATE 2.5 MG: 2.5 SOLUTION RESPIRATORY (INHALATION) at 19:50

## 2019-09-06 RX ADMIN — HYDROCODONE BITARTRATE AND ACETAMINOPHEN 1 TABLET: 10; 325 TABLET ORAL at 08:43

## 2019-09-06 RX ADMIN — GUAIFENESIN 400 MG: 100 SOLUTION ORAL at 08:51

## 2019-09-06 RX ADMIN — ALBUTEROL SULFATE 2.5 MG: 2.5 SOLUTION RESPIRATORY (INHALATION) at 11:24

## 2019-09-06 RX ADMIN — CLINDAMYCIN PHOSPHATE 600 MG: 600 INJECTION, SOLUTION INTRAVENOUS at 16:24

## 2019-09-06 RX ADMIN — TEMAZEPAM 30 MG: 15 CAPSULE ORAL at 21:06

## 2019-09-06 RX ADMIN — CLINDAMYCIN PHOSPHATE 600 MG: 600 INJECTION, SOLUTION INTRAVENOUS at 08:52

## 2019-09-06 RX ADMIN — SODIUM CHLORIDE 30 MG/ML INHALATION SOLUTION 4 ML: 30 SOLUTION INHALANT at 07:32

## 2019-09-06 RX ADMIN — FAMOTIDINE 20 MG: 40 POWDER, FOR SUSPENSION ORAL at 08:52

## 2019-09-06 RX ADMIN — MORPHINE SULFATE 2 MG: 2 INJECTION, SOLUTION INTRAMUSCULAR; INTRAVENOUS at 16:24

## 2019-09-06 RX ADMIN — CLINDAMYCIN PHOSPHATE 600 MG: 600 INJECTION, SOLUTION INTRAVENOUS at 03:03

## 2019-09-06 RX ADMIN — GUAIFENESIN 400 MG: 100 SOLUTION ORAL at 21:06

## 2019-09-06 RX ADMIN — HYDROCODONE BITARTRATE AND ACETAMINOPHEN 1 TABLET: 10; 325 TABLET ORAL at 03:08

## 2019-09-06 RX ADMIN — Medication 20 ML: at 14:00

## 2019-09-06 RX ADMIN — ALBUTEROL SULFATE 2.5 MG: 2.5 SOLUTION RESPIRATORY (INHALATION) at 15:36

## 2019-09-06 RX ADMIN — MORPHINE SULFATE 2 MG: 2 INJECTION, SOLUTION INTRAMUSCULAR; INTRAVENOUS at 05:44

## 2019-09-06 RX ADMIN — GUAIFENESIN 400 MG: 100 SOLUTION ORAL at 17:46

## 2019-09-06 RX ADMIN — METHYLPREDNISOLONE SODIUM SUCCINATE 40 MG: 40 INJECTION, POWDER, FOR SOLUTION INTRAMUSCULAR; INTRAVENOUS at 21:06

## 2019-09-06 RX ADMIN — ENOXAPARIN SODIUM 40 MG: 40 INJECTION SUBCUTANEOUS at 17:46

## 2019-09-06 RX ADMIN — CLINDAMYCIN PHOSPHATE 600 MG: 600 INJECTION, SOLUTION INTRAVENOUS at 21:06

## 2019-09-06 RX ADMIN — MORPHINE SULFATE 2 MG: 2 INJECTION, SOLUTION INTRAMUSCULAR; INTRAVENOUS at 11:17

## 2019-09-06 RX ADMIN — DEXTROSE MONOHYDRATE AND SODIUM CHLORIDE 75 ML/HR: 5; .9 INJECTION, SOLUTION INTRAVENOUS at 03:03

## 2019-09-06 RX ADMIN — MORPHINE SULFATE 2 MG: 2 INJECTION, SOLUTION INTRAMUSCULAR; INTRAVENOUS at 22:06

## 2019-09-06 RX ADMIN — GUAIFENESIN 400 MG: 100 SOLUTION ORAL at 14:38

## 2019-09-06 RX ADMIN — CEFEPIME HYDROCHLORIDE 2 G: 2 INJECTION, POWDER, FOR SOLUTION INTRAVENOUS at 05:37

## 2019-09-06 RX ADMIN — SALINE NASAL SPRAY 2 SPRAY: 1.5 SOLUTION NASAL at 08:51

## 2019-09-06 RX ADMIN — HYDROCODONE BITARTRATE AND ACETAMINOPHEN 1 TABLET: 10; 325 TABLET ORAL at 21:06

## 2019-09-06 RX ADMIN — GUAIFENESIN 400 MG: 100 SOLUTION ORAL at 03:08

## 2019-09-06 RX ADMIN — CEFEPIME HYDROCHLORIDE 2 G: 2 INJECTION, POWDER, FOR SOLUTION INTRAVENOUS at 14:38

## 2019-09-06 RX ADMIN — Medication 10 ML: at 21:07

## 2019-09-06 RX ADMIN — FAMOTIDINE 20 MG: 40 POWDER, FOR SUSPENSION ORAL at 17:48

## 2019-09-06 RX ADMIN — AMLODIPINE BESYLATE 5 MG: 5 TABLET ORAL at 08:43

## 2019-09-06 RX ADMIN — METHYLPREDNISOLONE SODIUM SUCCINATE 40 MG: 40 INJECTION, POWDER, FOR SOLUTION INTRAMUSCULAR; INTRAVENOUS at 08:52

## 2019-09-06 RX ADMIN — CEFEPIME HYDROCHLORIDE 2 G: 2 INJECTION, POWDER, FOR SOLUTION INTRAVENOUS at 21:07

## 2019-09-06 RX ADMIN — ALBUTEROL SULFATE 2.5 MG: 2.5 SOLUTION RESPIRATORY (INHALATION) at 07:32

## 2019-09-06 RX ADMIN — HYDROCODONE BITARTRATE AND ACETAMINOPHEN 1 TABLET: 10; 325 TABLET ORAL at 14:38

## 2019-09-06 RX ADMIN — SODIUM CHLORIDE 30 MG/ML INHALATION SOLUTION 4 ML: 30 SOLUTION INHALANT at 19:50

## 2019-09-06 NOTE — PROGRESS NOTES
Patient lying in bed resting quietly with eyes closed. Reports inability to sleep throughout evening. Pain intermittently throughout evening, relieved briefly with medication administration. Respirations shallow, regular rate on 4L oxygen via hi flow nasal cannula. No s/sx of distress. Bed in low & locked position. Call light and personal belongings within reach.

## 2019-09-06 NOTE — PROGRESS NOTES
Progress Note    Patient: Reinaldo Pennington MRN: [id number]  SSN: [id number]    YOB: 1960  Age: 60 y.o. Sex: male      Admit Date: 9/4/2019    LOS: 2 days     Subjective:     From previous note:     \" Reinaldo Pennington is a 60 y.o. male who is being seen for assuming care.      Patient was admitted on 9-4-2019 due to fever, shortness of breath and congestion symptoms with cough and hypoxia.      He has history of squamous cell cancer of hypopharynx, s/p treatment with chemotherapy and radiation treatment in 2005, with current PEG tube placement and multiple EGD for dilation with current silent aspiration.      Also with bilateral nasal polyps.      Was treated recently for pneumonia in July 2019 and August 2019.      He developed recurrent symptoms of shortness of breath and coughing and hypoxia and congestion again last week. Was treated with Levaquin and Clindamycin, without improvement. \"     Patient had bronchoscopy on 9-5-2019, a lot of purulent material was found in lower lung field and was suctioned off. Patient is feeling better. Patient only gets nutrition via PEG and is having modified barium swallowing study today. Objective:     Vitals:    09/05/19 2236 09/06/19 0302 09/06/19 0723 09/06/19 0733   BP: 149/85 (!) 153/92 (!) 154/96    Pulse: 93 (!) 103 96    Resp: 18 18 20    Temp: 98.1 °F (36.7 °C) 98.1 °F (36.7 °C) 97.9 °F (36.6 °C)    SpO2: 92% 90% 92% 97%   Weight:            Intake and Output:  Current Shift: No intake/output data recorded. Last three shifts: 09/04 1901 - 09/06 0700  In: 3975.3 [I.V.:2801.3]  Out: 1550 [Urine:1550]    Physical Exam:     Physical Exam:     General:                    The patient is a male in no acute distress. Patient appears chronically ill. He is sitting up in bed. Thin. Appears with baljit energy compared to yesterday. IMCA:                                   [id number].    Eyes:                                   palpebral pallor, no scleral icterus. ENT:                                    External auricular and nasal exam within normal limits.                                             JUMXDF membranes are moist.  Neck:                                   Supple, non-tender, no JVD. Lungs:                       decreased to auscultation bilaterally without wheezes or crackles.                                             No respiratory accessory muscle use. Heart:                                  Regular rate and rhythm, without murmurs, rubs, or gallops. Abdomen:                  Soft, non-tender, distended with normoactive bowel sounds. Presence of PEG tube in place   Genitourinary:           No tenderness over the bladder or bilateral CVAs. Extremities:               Without clubbing, cyanosis, or edema. Skin:                                    Normal color, texture, and turgor. No rashes, lesions, or jaundice. Pulses:                      Radial and dorsalis pedis pulses present 2+ bilaterally.                                               Capillary refill <2s. Neurologic:                CN II-XII grossly intact and symmetrical.                                               Moving all four extremities well with no focal deficits. Psychiatric:                 appropriate affect. Alert and oriented x 3       Lab/Data Review:    CT chest   9-3-2019  IMPRESSION:  1. Extensive tree-in-bud type opacities bilaterally with overall mild  progression. These findings most commonly or secondary to bacterial infections  other etiologies including mycobacterial infections and hypersensitivity  pneumonitis could also have this appearance. 2. Numerous coarse parenchymal calcifications within the lower lung zones  suggests prior granulomatous disease. Alveolar microlithiasis is thought to be  less likely. 3. Debris-filled bronchi will be most compatible with aspiration.  Confluent  airspace opacities within the lower lobes have resolved. Recent Results (from the past 24 hour(s))   CULTURE, RESPIRATORY/SPUTUM/BRONCH W GRAM STAIN    Collection Time: 09/05/19  1:00 PM   Result Value Ref Range    Special Requests: NO SPECIAL REQUESTS      GRAM STAIN PENDING     Culture result: NO GROWTH 1 DAY     CULTURE, RESPIRATORY/SPUTUM/BRONCH W GRAM STAIN    Collection Time: 09/05/19  1:00 PM   Result Value Ref Range    Special Requests: NO SPECIAL REQUESTS      GRAM STAIN PENDING     Culture result: NO GROWTH 1 DAY     BRONCH. LAVAGE DIFF.     Collection Time: 09/05/19  1:00 PM   Result Value Ref Range    BRCH LAVAGE DIFF          BRCH NEUTROPHIL 80 %    BRCH LYMPHS 17 %    BRCH MACROPHAGES 3 %    BRCH EOSINS 0 %       Current Facility-Administered Medications:     labetalol (NORMODYNE) tablet 100 mg, 100 mg, Oral, Q6H PRN, Stacey Garcia MD, 100 mg at 09/05/19 0833    amLODIPine (NORVASC) tablet 5 mg, 5 mg, Oral, DAILY, Stacey Garcia MD, 5 mg at 09/06/19 0843    albuterol (PROVENTIL VENTOLIN) nebulizer solution 2.5 mg, 2.5 mg, Nebulization, QID RT, Tariq Muñoz MD, 2.5 mg at 09/06/19 0732    clindamycin (CLEOCIN) 600mg D5W 50mL IVPB (premix), 600 mg, IntraVENous, Q6H, Zach Johnson MD, Last Rate: 100 mL/hr at 09/06/19 0852, 600 mg at 09/06/19 0852    NUTRITIONAL SUPPORT ELECTROLYTE PRN ORDERS, , Does Not Apply, PRN, Stacey Garcia MD    sodium chloride (NS) flush 5-40 mL, 5-40 mL, IntraVENous, Q8H, Ludmila Funez MD, 10 mL at 09/06/19 0544    sodium chloride (NS) flush 5-40 mL, 5-40 mL, IntraVENous, PRN, Ludmila Funez MD    morphine injection 2 mg, 2 mg, IntraVENous, Q4H PRN, Ludmila Funez MD, 2 mg at 09/06/19 0544    naloxone [facility]) injection 0.4 mg, 0.4 mg, IntraVENous, PRN, Ludmila Funez MD    ondansetron [facility]) injection 4 mg, 4 mg, IntraVENous, Q4H PRN, Ludmila Funez MD    enoxaparin (LOVENOX) injection 40 mg, 40 mg, SubCUTAneous, Q24H, Ludmila Funez MD, 40 mg at 09/05/19 1712    cefepime (MAXIPIME) 2 g in 0.9% sodium chloride (MBP/ADV) 100 mL, 2 g, IntraVENous, Q8H, Jason Panda MD, Stopped at 09/06/19 0620    sodium chloride (OCEAN) 0.65 % nasal squeeze bottle 2 Spray, 2 Spray, Both Nostrils, Q2H PRN, Jason Panda MD, 2 [place] at 09/06/19 0851    methylPREDNISolone (PF) (SOLU-MEDROL) injection 40 mg, 40 mg, IntraVENous, Q12H, Jason Panda MD, 40 mg at 09/06/19 0852    sodium chloride 3% hypertonic nebulizer soln, 4 mL, Nebulization, BID, Jason Panda MD, 4 mL at 09/06/19 0732    guaiFENesin (ROBITUSSIN) 100 mg/5 mL oral liquid 400 mg, 400 mg, Per Cande Cocks, Q4H, Jason Panda MD, 400 mg at 09/06/19 0851    acetaminophen (TYLENOL) tablet 650 mg, 650 mg, Per G Tube, Q4H PRN, Jason Panda MD, 650 mg at 09/04/19 2210    HYDROcodone-acetaminophen (NORCO)  mg tablet 1 Tab, 1 Tab, Per G Tube, Q4H PRN, Jason Panda MD, 1 Tab at 09/06/19 0843    LORazepam (ATIVAN) tablet 1 mg, 1 mg, Per G Tube, BID PRN, Jason Panda MD    magic mouthwash [facility]) oral suspension 5 mL, 5 mL, Swish and Spit, Q4H PRN, Jason Panda MD    senna-docusate (PERICOLACE) 8.6-50 mg per tablet 1 Tab, 1 Tab, Per G Tube, DAILY PRN, Jason Panda MD    temazepam (RESTORIL) capsule 30 mg, 30 mg, Per G Tube, QHS PRN, Jason Panda MD, 30 mg at 09/05/19 0130    famotidine (PEPCID) 40 mg/5 mL (8 mg/mL) oral suspension 20 mg, 20 mg, Per G Tube, BID, Jason Panda MD, 20 mg at 09/06/19 9334      Assessment:     Principal Problem:    Acute respiratory failure with hypoxemia ([address] (9/4/2019)    Active Problems:    Pneumonia of both lungs due to Escherichia coli ([facility] (9/4/2019)      Acute maxillary sinusitis (9/4/2019)      Ineffective airway clearance (9/4/2019)      Aspiration, chronic pulmonary (9/4/2019)        Plan:     Pneumonia  Failed out-patient treatments  No sepsis   previous squamous cell cancer of hypopharynx and s/p radiation and chemotherapy. Chronic aspiration.    Continue Empiric IV antibiotics. Clindamycin added. Continue PEG tube feeding. On IV steroid.    For modified barium swallowing study today.      I have discussed the plan of care with patient.     DVT prophylaxis : Lovenox SC      Signed By: Arabella Mnotilla MD     September 6, 2019

## 2019-09-06 NOTE — PROGRESS NOTES
[facility]ika [facility]  Admission Date: 9/4/2019             Daily Progress Note: 9/6/2019    The patient's chart is reviewed and the patient is discussed with the staff. Ld Vazquez a 60y.o. 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  He underwent a CT of the chest on 7/6 which revealed diffuse lower lobe predominant nodular opacities with plugging and nonspecific mediastinal and hilar lymphadenopathy.  No pulmonary embolism was noted.  He had a leukocytosis measured at 18.8k with a neutrophilic predominance.  Admission wasn't recommended and he was discharged home.       He was then seen on August 12 by Dr. Jerica Yepez after completing antibiotics with Augmentin and Cipro.  At that appointment he was started on Septra, Protonix and nystatin with Magic mouthwash for oral candidiasis and sinusitis.  He was noted to have right neck discomfort and a CT of the neck was recommended if his symptoms of right neck pain continued.     He was initially seen at [facility] on August 29, when his oxygen saturation was noted to be 83% on room air. Amanda Miller reported he had just experienced his first night without a fever since July. Amanda Tamayoizzy reported significant right sinus pain and congestion as well as a persistent cough.  Hospitalization was recommended, but the patient declined in favor of oral antibiotics and oxygen.  Levaquin and clindamycin were initiated and oxygen was ordered.  The oxygen was not delivered over the holiday weekend and the patient has had a very difficult course.  On Monday 9/2, Levaquin was changed to [facility] after sputum cultures revealed E.  Coli which was resistant to levaquin.     Congestion has remained heavy.  The patient remains hypoxic.  He feels weak and complains of back and chest pain.  He says that the prednisone he had previously been on for sinus congestion by Dr. Armand Parry was helping but since he has completed the course he feels worse again. Otoniel Boone also reports severe nightmares. Otoniel Boone is willing to consider hospitalization for IV antibiotics at this time. Subjective:     Bronch completed yesterday with copious white secretions present and suctioned clear. Pt refused MBS to assess aspiration risk. Clindamycin added to Abx. All bronch results pending. WBC 33K yesterday with PCT 0.2. Afebrile with sat 97 on 4L.      Current Facility-Administered Medications   Medication Dose Route Frequency    labetalol (NORMODYNE) tablet 100 mg  100 mg Oral Q6H PRN    amLODIPine (NORVASC) tablet 5 mg  5 mg Oral DAILY    albuterol (PROVENTIL VENTOLIN) nebulizer solution 2.5 mg  2.5 mg Nebulization QID RT    clindamycin (CLEOCIN) 600mg D5W 50mL IVPB (premix)  600 mg IntraVENous Q6H    NUTRITIONAL SUPPORT ELECTROLYTE PRN ORDERS   Does Not Apply PRN    sodium chloride (NS) flush 5-40 mL  5-40 mL IntraVENous Q8H    sodium chloride (NS) flush 5-40 mL  5-40 mL IntraVENous PRN    morphine injection 2 mg  2 mg IntraVENous Q4H PRN    naloxone (NARCAN) injection 0.4 mg  0.4 mg IntraVENous PRN    ondansetron (ZOFRAN) injection 4 mg  4 mg IntraVENous Q4H PRN    enoxaparin (LOVENOX) injection 40 mg  40 mg SubCUTAneous Q24H    cefepime (MAXIPIME) 2 g in 0.9% sodium chloride (MBP/ADV) 100 mL  2 g IntraVENous Q8H    sodium chloride (OCEAN) 0.65 % nasal squeeze bottle 2 Spray  2 Spray Both Nostrils Q2H PRN    methylPREDNISolone (PF) (SOLU-MEDROL) injection 40 mg  40 mg IntraVENous Q12H    dextrose 5% and 0.9% NaCl infusion  75 mL/hr IntraVENous CONTINUOUS    sodium chloride 3% hypertonic nebulizer soln  4 mL Nebulization BID    guaiFENesin (ROBITUSSIN) 100 mg/5 mL oral liquid 400 mg  400 mg Per G Tube Q4H    acetaminophen (TYLENOL) tablet 650 mg  650 mg Per G Tube Q4H PRN    HYDROcodone-acetaminophen (NORCO)  mg tablet 1 Tab  1 Tab Per G Tube Q4H PRN    LORazepam (ATIVAN) tablet 1 mg  1 mg Per G Tube BID PRN    magic mouthwash (SIMA) oral suspension 5 mL  5 mL Swish and Spit Q4H PRN    senna-docusate (PERICOLACE) 8.6-50 mg per tablet 1 Tab  1 Tab Per G Tube DAILY PRN    temazepam (RESTORIL) capsule 30 mg  30 mg Per G Tube QHS PRN    famotidine (PEPCID) 40 mg/5 mL (8 mg/mL) oral suspension 20 mg  20 mg Per G Tube BID       Review of Systems    Constitutional: negative for fever, chills, sweats  Cardiovascular: negative for chest pain, palpitations, syncope, edema  Gastrointestinal:  negative for dysphagia, reflux, vomiting, diarrhea, abdominal pain, or melena  Neurologic:  negative for focal weakness, numbness, headache    Objective:     Vitals:    09/05/19 2236 09/06/19 0302 09/06/19 0723 09/06/19 0733   BP: 149/85 (!) 153/92 (!) 154/96    Pulse: 93 (!) 103 96    Resp: 18 18 20    Temp: 98.1 °F (36.7 °C) 98.1 °F (36.7 °C) 97.9 °F (36.6 °C)    SpO2: 92% 90% 92% 97%   Weight:             Intake/Output Summary (Last 24 hours) at 9/6/2019 0831  Last data filed at 9/6/2019 0620  Gross per 24 hour   Intake 2772.75 ml   Output 1150 ml   Net 1622.75 ml       Physical Exam:   Constitution:  the patient is well developed and in no acute distress  EENMT:  Sclera clear, pupils equal, oral mucosa moist  Respiratory: scattered rhonchi bilaterally  Cardiovascular:  RRR without M,G,R  Gastrointestinal: soft and non-tender; with positive bowel sounds. Musculoskeletal: warm without cyanosis. There is no lower extremity edema. Skin:  no jaundice or rashes  Neurologic: no gross neuro deficits     Psychiatric:  alert and oriented x 3    Chest CT 9/3:             IMPRESSION:  1. Extensive tree-in-bud type opacities bilaterally with overall mild  progression.  These findings most commonly or secondary to bacterial infections  other etiologies including mycobacterial infections and hypersensitivity  pneumonitis could also have this appearance. 2. Numerous coarse parenchymal calcifications within the lower lung zones  suggests prior granulomatous disease. Alveolar microlithiasis is thought to be  less likely. 3. Debris-filled bronchi will be most compatible with aspiration. Confluent  airspace opacities within the lower lobes have resolved. LAB  No results for input(s): GLUCPOC in the last 72 hours. No lab exists for component: GLPOC   Recent Labs     09/05/19  0818   WBC 33.0*   HGB 12.7*   HCT 38.8*        Recent Labs     09/05/19  0818   *   K 4.4   CL 99   CO2 27   *   BUN 12   CREA 0.54*   CA 8.5     No results for input(s): PH, PCO2, PO2, HCO3, PHI, PCO2I, PO2I, HCO3I in the last 72 hours. No results for input(s): LCAD, LAC in the last 72 hours. Assessment:  (Medical Decision Making)     Hospital Problems  Date Reviewed: 9/4/2019          Codes Class Noted POA    Acute respiratory failure with hypoxemia [facility]) ICD-10-CM: J96.01  ICD-9-CM: 518.81  9/4/2019 Unknown    Currently on 4L    Pneumonia of both lungs due to Escherichia coli [facility]) ICD-10-CM: J15.5  ICD-9-CM: 482.82  9/4/2019 Unknown    On Cefepime and Clinda. Has extensive tree in bud opacities as well and bronchiectasis. Need to rule out MC or other pathogens. Await bronch results. Acute maxillary sinusitis ICD-10-CM: J01.00  ICD-9-CM: 461.0  9/4/2019 Unknown    On ABx    Ineffective airway clearance ICD-10-CM: R06.89  ICD-9-CM: 786.09  9/4/2019 Unknown    Bronched yesterday. Aspiration, chronic pulmonary ICD-10-CM: T17.908A  ICD-9-CM: 934.9  9/4/2019 Unknown    Had silent aspiration on last MBS. Has PEG. Refused MBS yesterday          Plan:  (Medical Decision Making)     Continue current ABx. Wean oxygen as tolerated. TF for nutrition. Check CYNTHIA before discharge to assess for evidence of significant PABLO.    --    More than 50% of the time documented was spent in face-to-face contact with the patient and in the care of the patient on the floor/unit where the patient is located.     Denice Seay MD

## 2019-09-06 NOTE — PROGRESS NOTES
Bedside shift report given to MED Vasquez. Respirations regular rate & rhythm on 4L oxygen via hi flow nasal cannula. No s/sx of distress.

## 2019-09-06 NOTE — PROGRESS NOTES
09/06/19 0307   Pain 1   Pain Scale 1 Numeric (0 - 10)   Pain Intensity 1 6   Patient Stated Pain Goal 0   Pain Onset 1 acute   Pain Location 1 Back;Rib cage   Pain Orientation 1 Posterior;Right;Left   Pain Description 1 Aching;Constant   Pain Intervention(s) 1 Medication (see MAR)   Patient received NORCO  mg via Karthikeyan-key G tube per request

## 2019-09-06 NOTE — PROGRESS NOTES
Nutrition F/U:  TF Management for the Hospitalists. Assessment:  The patient continues to be secretive about his TF - content of his blenderized food, the amount and frequency that he feeds himself. I placed 4 cartons of Osmolite 1.2 on his bedside table and explained the schedule that he could follow in order to get all 6 cartons consumed in order to meet his needs between 0600 and 2100. He politely told me to \"not worry about his feedings\" that his wife brought in blenderized food that he used. No labs today. No bowel movement reported. Enteral Access:   PEG. Macronutrient Needs (57 kg):  Estimated calorie needs - 5158-4729 ezio/day (30-35 ezio/kg/day)   Estimated protein needs - 68-86 gm pro/day (1.2-1.5 gm pro/kg/day)   Max CHO/day - 249 gm CHO/day (50% ezio/day)   Fluid/day - 1.7-2 liters/day (1 ml/ezio/day)  Intake/Comparative Standards:  No data available. Diagnosis:  Is there a need for a new diagnosis? Intervention:   Meals and Snacks: NPO. Enteral Nutrition: If he uses our formula - bolus TF with Osmolite 1.2 as 1 carton every 3 hours from 6 AM through 9 PM daily with a 50 ml water flush before and after each feeding ml/hr water flush via PEG - 1710 calories/day (100% calorie goal), 79 grams protein/day (100% protein goal), 225 grams CHO/day (does not exceed max CHO limit) and 1770 ml water/day (100% fluid goal). Mineral Supplement Therapy: Nutrition Support Orders/Electrolyte Management Replacement Protocols are active on the MAR. Coordination of Nutrition Care by a Nutrition Professional: Collaboration with MED Vasquez. Nutrition Discharge Plan: Continued PEG feedings. Anish Veronica.  Tay Henriquez  [phone]

## 2019-09-06 NOTE — PROGRESS NOTES
09/05/19 2340   Pain 1   Pain Scale 1 Numeric (0 - 10)   Pain Intensity 1 7   Patient Stated Pain Goal 0   Pain Reassessment 1 Yes   Pain Onset 1 acute   Pain Location 1 Chest;Back;Rib cage   Pain Orientation 1 Posterior;Mid;Right;Left   Pain Description 1 Aching;Constant   Pain Intervention(s) 1 Medication (see MAR)   patient received morphine 2 mg via IV per request

## 2019-09-06 NOTE — PROGRESS NOTES
09/06/19 0544   Pain 1   Pain Scale 1 Numeric (0 - 10)   Pain Intensity 1 8   Patient Stated Pain Goal 0   Pain Onset 1 acute   Pain Location 1 Back;Rib cage; Chest   Pain Orientation 1 Posterior;Right;Left   Pain Description 1 Aching;Constant   Pain Intervention(s) 1 Medication (see MAR)   Patient received morphine 2 mg via IV per request

## 2019-09-06 NOTE — PROGRESS NOTES
Pt in bed resting rr are even and unlabored lung sounds are course with crackles noted LL. Dyspnea on exertion. abd is soft bowel sounds are active PEG in place patent and clamped. NPO. Skin intact no distress noted. Safety measures in place will continue to monitor.

## 2019-09-06 NOTE — PROGRESS NOTES
Spiritual Care Visit. Follow up visit. Patient was visited by [address] [place] [address]. Entered by Adrian Noel, Staff Chaplain. Saucedo, Aaliyah

## 2019-09-06 NOTE — PROGRESS NOTES
Late entry. Spiritual Care Visit. Initial visit. Spiritual Care Volunteer left a card. Patient was out of the room. Celio Shadow by Mark Caraballo, Staff .  Lewis, .B.

## 2019-09-07 LAB — GALACTOMANNAN AG SERPL IA-ACNC: 0.41

## 2019-09-07 PROCEDURE — 74011250637 HC RX REV CODE- 250/637: Performed by: INTERNAL MEDICINE

## 2019-09-07 PROCEDURE — 94640 AIRWAY INHALATION TREATMENT: CPT

## 2019-09-07 PROCEDURE — 74011000250 HC RX REV CODE- 250: Performed by: INTERNAL MEDICINE

## 2019-09-07 PROCEDURE — 99232 SBSQ HOSP IP/OBS MODERATE 35: CPT | Performed by: INTERNAL MEDICINE

## 2019-09-07 PROCEDURE — 74011000258 HC RX REV CODE- 258: Performed by: INTERNAL MEDICINE

## 2019-09-07 PROCEDURE — 74011250636 HC RX REV CODE- 250/636: Performed by: INTERNAL MEDICINE

## 2019-09-07 PROCEDURE — 77010033678 HC OXYGEN DAILY

## 2019-09-07 PROCEDURE — 94760 N-INVAS EAR/PLS OXIMETRY 1: CPT

## 2019-09-07 PROCEDURE — 65270000029 HC RM PRIVATE

## 2019-09-07 RX ORDER — OXYCODONE AND ACETAMINOPHEN 10; 325 MG/1; MG/1
1 TABLET ORAL EVERY 6 HOURS
Status: DISCONTINUED | OUTPATIENT
Start: 2019-09-07 | End: 2019-09-10 | Stop reason: HOSPADM

## 2019-09-07 RX ORDER — OXYCODONE AND ACETAMINOPHEN 10; 325 MG/1; MG/1
1 TABLET ORAL
Status: DISCONTINUED | OUTPATIENT
Start: 2019-09-07 | End: 2019-09-07

## 2019-09-07 RX ADMIN — OXYCODONE HYDROCHLORIDE AND ACETAMINOPHEN 1 TABLET: 10; 325 TABLET ORAL at 17:38

## 2019-09-07 RX ADMIN — ALBUTEROL SULFATE 2.5 MG: 2.5 SOLUTION RESPIRATORY (INHALATION) at 15:11

## 2019-09-07 RX ADMIN — CLINDAMYCIN PHOSPHATE 600 MG: 600 INJECTION, SOLUTION INTRAVENOUS at 16:39

## 2019-09-07 RX ADMIN — MORPHINE SULFATE 2 MG: 2 INJECTION, SOLUTION INTRAMUSCULAR; INTRAVENOUS at 03:36

## 2019-09-07 RX ADMIN — METHYLPREDNISOLONE SODIUM SUCCINATE 30 MG: 40 INJECTION, POWDER, FOR SOLUTION INTRAMUSCULAR; INTRAVENOUS at 21:00

## 2019-09-07 RX ADMIN — ENOXAPARIN SODIUM 40 MG: 40 INJECTION SUBCUTANEOUS at 17:38

## 2019-09-07 RX ADMIN — SODIUM CHLORIDE 30 MG/ML INHALATION SOLUTION 4 ML: 30 SOLUTION INHALANT at 19:51

## 2019-09-07 RX ADMIN — MORPHINE SULFATE 2 MG: 2 INJECTION, SOLUTION INTRAMUSCULAR; INTRAVENOUS at 09:11

## 2019-09-07 RX ADMIN — OXYCODONE HYDROCHLORIDE AND ACETAMINOPHEN 1 TABLET: 10; 325 TABLET ORAL at 11:15

## 2019-09-07 RX ADMIN — Medication 10 ML: at 05:08

## 2019-09-07 RX ADMIN — CLINDAMYCIN PHOSPHATE 600 MG: 600 INJECTION, SOLUTION INTRAVENOUS at 11:17

## 2019-09-07 RX ADMIN — CEFEPIME HYDROCHLORIDE 2 G: 2 INJECTION, POWDER, FOR SOLUTION INTRAVENOUS at 13:56

## 2019-09-07 RX ADMIN — FAMOTIDINE 20 MG: 40 POWDER, FOR SUSPENSION ORAL at 17:38

## 2019-09-07 RX ADMIN — SODIUM CHLORIDE 30 MG/ML INHALATION SOLUTION 4 ML: 30 SOLUTION INHALANT at 08:50

## 2019-09-07 RX ADMIN — ALBUTEROL SULFATE 2.5 MG: 2.5 SOLUTION RESPIRATORY (INHALATION) at 12:05

## 2019-09-07 RX ADMIN — GUAIFENESIN 400 MG: 100 SOLUTION ORAL at 11:16

## 2019-09-07 RX ADMIN — CEFEPIME HYDROCHLORIDE 2 G: 2 INJECTION, POWDER, FOR SOLUTION INTRAVENOUS at 21:00

## 2019-09-07 RX ADMIN — Medication 10 ML: at 21:47

## 2019-09-07 RX ADMIN — TEMAZEPAM 30 MG: 15 CAPSULE ORAL at 23:34

## 2019-09-07 RX ADMIN — ALBUTEROL SULFATE 2.5 MG: 2.5 SOLUTION RESPIRATORY (INHALATION) at 19:51

## 2019-09-07 RX ADMIN — Medication 10 ML: at 11:26

## 2019-09-07 RX ADMIN — METHYLPREDNISOLONE SODIUM SUCCINATE 40 MG: 40 INJECTION, POWDER, FOR SOLUTION INTRAMUSCULAR; INTRAVENOUS at 09:12

## 2019-09-07 RX ADMIN — Medication 5 ML: at 13:39

## 2019-09-07 RX ADMIN — CEFEPIME HYDROCHLORIDE 2 G: 2 INJECTION, POWDER, FOR SOLUTION INTRAVENOUS at 05:08

## 2019-09-07 RX ADMIN — CLINDAMYCIN PHOSPHATE 600 MG: 600 INJECTION, SOLUTION INTRAVENOUS at 03:36

## 2019-09-07 RX ADMIN — MORPHINE SULFATE 2 MG: 2 INJECTION, SOLUTION INTRAMUSCULAR; INTRAVENOUS at 21:00

## 2019-09-07 RX ADMIN — CLINDAMYCIN PHOSPHATE 600 MG: 600 INJECTION, SOLUTION INTRAVENOUS at 21:01

## 2019-09-07 RX ADMIN — MORPHINE SULFATE 2 MG: 2 INJECTION, SOLUTION INTRAMUSCULAR; INTRAVENOUS at 13:38

## 2019-09-07 RX ADMIN — AMLODIPINE BESYLATE 5 MG: 5 TABLET ORAL at 11:15

## 2019-09-07 RX ADMIN — Medication 10 ML: at 09:12

## 2019-09-07 RX ADMIN — OXYCODONE HYDROCHLORIDE AND ACETAMINOPHEN 1 TABLET: 10; 325 TABLET ORAL at 23:34

## 2019-09-07 RX ADMIN — GUAIFENESIN 400 MG: 100 SOLUTION ORAL at 20:59

## 2019-09-07 RX ADMIN — FAMOTIDINE 20 MG: 40 POWDER, FOR SUSPENSION ORAL at 11:17

## 2019-09-07 RX ADMIN — ALBUTEROL SULFATE 2.5 MG: 2.5 SOLUTION RESPIRATORY (INHALATION) at 08:50

## 2019-09-07 NOTE — PROGRESS NOTES
Patient refused to take meds via peg tube and did not want to have IV abx started at this time. Patient requested nurse hold these medications for \"a couple hours so I can get cleaned up. \"     Nurse will attempt to give medications later. Will continue to monitor.

## 2019-09-07 NOTE — PROGRESS NOTES
Progress Note    Patient: Jessika Wang MRN: [id number]  SSN: [id number]    YOB: 1960  Age: 60 y.o. Sex: male      Admit Date: 9/4/2019    LOS: 3 days     Subjective:     From previous note:     \" Jessika Wang is a 60 y.o. male who is being seen for assuming care.      Patient was admitted on 9-4-2019 due to fever, shortness of breath and congestion symptoms with cough and hypoxia.      He has history of squamous cell cancer of hypopharynx, s/p treatment with chemotherapy and radiation treatment in 2005, with current PEG tube placement and multiple EGD for dilation with current silent aspiration.      Also with bilateral nasal polyps.      Was treated recently for pneumonia in July 2019 and August 2019.      He developed recurrent symptoms of shortness of breath and coughing and hypoxia and congestion again last week. Was treated with Levaquin and Clindamycin, without improvement. \"     Patient had bronchoscopy on 9-5-2019, a lot of purulent material was found in lower lung field and was suctioned off. Today, patient is complaining of pain in throat. He normally takes Percocet 10 mg at home PRN. Tolerating PEG tube feeding. Objective:     Vitals:    09/07/19 0348 09/07/19 0432 09/07/19 0713 09/07/19 1006   BP: (!) 179/99 (!) 170/97 (!) 159/95    Pulse: 96  94    Resp: 18  17    Temp: 97.7 °F (36.5 °C)  97.6 °F (36.4 °C)    SpO2: 94%  92%    Weight:    56.2 kg (123 lb 12.8 oz)        Intake and Output:  Current Shift: No intake/output data recorded. Last three shifts: 09/05 1901 - 09/07 0700  In: 4231.5 [I.V.:1482.5]  Out: 300 [Urine:300]    Physical Exam:     Physical Exam:     General:                    The patient is a male in no acute distress. Patient appears chronically ill. He is sitting up in bed. Thin. TJFU:                                   [id number].    Eyes:                                   palpebral pallor, no scleral icterus. ENT:                                    External auricular and nasal exam within normal limits.                                             FVHNQD membranes are moist.  Neck:                                   Supple, non-tender, no JVD. Lungs:                       decreased to auscultation bilaterally without wheezes or crackles.                                             No respiratory accessory muscle use. Heart:                                  Regular rate and rhythm, without murmurs, rubs, or gallops. Abdomen:                  Soft, non-tender, distended with normoactive bowel sounds. Presence of PEG tube in place   Genitourinary:           No tenderness over the bladder or bilateral CVAs. Extremities:               Without clubbing, cyanosis, or edema. Skin:                                    Normal color, texture, and turgor. No rashes, lesions, or jaundice. Pulses:                      Radial and dorsalis pedis pulses present 2+ bilaterally.                                               Capillary refill <2s. Neurologic:                CN II-XII grossly intact and symmetrical.                                               Moving all four extremities well with no focal deficits. Psychiatric:                 appropriate affect. Alert and oriented x 3       Lab/Data Review:    CT chest   9-3-2019  IMPRESSION:  1. Extensive tree-in-bud type opacities bilaterally with overall mild  progression. These findings most commonly or secondary to bacterial infections  other etiologies including mycobacterial infections and hypersensitivity  pneumonitis could also have this appearance. 2. Numerous coarse parenchymal calcifications within the lower lung zones  suggests prior granulomatous disease. Alveolar microlithiasis is thought to be  less likely. 3. Debris-filled bronchi will be most compatible with aspiration. Confluent  airspace opacities within the lower lobes have resolved.     No results found for this or any previous visit (from the past 24 hour(s)).     Current Facility-Administered Medications:     oxyCODONE-acetaminophen (PERCOCET 10)  mg per tablet 1 Tab, 1 Tab, Oral, Q6H, Avril Gauthier MD    methylPREDNISolone (PF) (SOLU-MEDROL) injection 30 mg, 30 mg, IntraVENous, Q12H, Avril Gauthier MD    labetalol (NORMODYNE) tablet 100 mg, 100 mg, Oral, Q6H PRN, Stacey Garcia MD, 100 mg at 09/05/19 0833    amLODIPine (NORVASC) tablet 5 mg, 5 mg, Oral, DAILY, Stacey Garcia MD, Stopped at 09/07/19 0911    albuterol (PROVENTIL VENTOLIN) nebulizer solution 2.5 mg, 2.5 mg, Nebulization, QID RT, Avril Gauthier MD, 2.5 mg at 09/07/19 0850    clindamycin (CLEOCIN) 600mg D5W 50mL IVPB (premix), 600 mg, IntraVENous, Q6H, Cat Day MD, Stopped at 09/07/19 5687    NUTRITIONAL SUPPORT ELECTROLYTE PRN ORDERS, , Does Not Apply, PRN, Stacey Garcia MD    sodium chloride (NS) flush 5-40 mL, 5-40 mL, IntraVENous, Q8H, Manuel Restrepo MD, 10 mL at 09/07/19 0508    sodium chloride (NS) flush 5-40 mL, 5-40 mL, IntraVENous, PRN, Manuel Restrepo MD, 10 mL at 09/07/19 0912    morphine injection 2 mg, 2 mg, IntraVENous, Q4H PRN, Manuel Restrepo MD, 2 mg at 09/07/19 0911    naloxone [facility]) injection 0.4 mg, 0.4 mg, IntraVENous, PRN, Manuel Restrepo MD    ondansetron [facility]) injection 4 mg, 4 mg, IntraVENous, Q4H PRN, Manuel Restrepo MD    enoxaparin (LOVENOX) injection 40 mg, 40 mg, SubCUTAneous, Q24H, Manuel Restrepo MD, 40 mg at 09/06/19 1746    cefepime (MAXIPIME) 2 g in 0.9% sodium chloride (MBP/ADV) 100 mL, 2 g, IntraVENous, Q8H, Manuel Restrepo MD, Last Rate: 200 mL/hr at 09/07/19 0508, 2 g at 09/07/19 0508    sodium chloride (OCEAN) 0.65 % nasal squeeze bottle 2 Spray, 2 Spray, Both Nostrils, Q2H PRN, Manuel Restrepo MD, [address] [place] at 09/06/19 0851    sodium chloride 3% hypertonic nebulizer soln, 4 mL, Nebulization, BID, Manuel Restrepo MD, 4 mL at 09/07/19 0850   guaiFENesin (ROBITUSSIN) 100 mg/5 mL oral liquid 400 mg, 400 mg, Per Shelton Gemma, Q4H, Grazyna Sanchez MD, Stopped at 09/07/19 0911    acetaminophen (TYLENOL) tablet 650 mg, 650 mg, Per G Tube, Q4H PRN, Grazyna Sanchez MD, 650 mg at 09/04/19 2210    LORazepam (ATIVAN) tablet 1 mg, 1 mg, Per G Tube, BID PRN, Grazyna Sanchez MD    magic mouthwash [facility]) oral suspension 5 mL, 5 mL, Swish and Spit, Q4H PRN, Grazyna Sanchez MD    senna-docusate (PERICOLACE) 8.6-50 mg per tablet 1 Tab, 1 Tab, Per G Tube, DAILY PRN, Grazyna Sanchez MD    temazepam (RESTORIL) capsule 30 mg, 30 mg, Per G Tube, QHS PRN, Grazyna Sanchez MD, 30 mg at 09/06/19 2106    famotidine (PEPCID) 40 mg/5 mL (8 mg/mL) oral suspension 20 mg, 20 mg, Per G Tube, BID, Grazyna Sanchez MD, Stopped at 09/07/19 4956      Assessment:     Principal Problem:    Acute respiratory failure with hypoxemia ([facility] [address] (9/4/2019)    Active Problems:    Pneumonia of both lungs due to Escherichia coli ([facility] [address] (9/4/2019)      Acute maxillary sinusitis (9/4/2019)      Ineffective airway clearance (9/4/2019)      Aspiration, chronic pulmonary (9/4/2019)        Plan:     Pneumonia  Failed out-patient treatments  No sepsis   previous squamous cell cancer of hypopharynx and s/p radiation and chemotherapy. Chronic aspiration. Continue Empiric IV antibiotics. Clindamycin added. Continue PEG tube feeding. On IV steroid.    Give Percocet for pain control.      I have discussed the plan of care with patient.     DVT prophylaxis : Lovenox SC      Signed By: Cely Baca MD     September 7, 2019

## 2019-09-07 NOTE — PROGRESS NOTES
B/P 170/97 at 0432, b/p rechecked by RN with same readings. patient refused PRN labetalol for high b/p. PRN medication ordered by MD for SBP >160.  pt educated on importance of medication for treating HTN. pt stated \"i do not want anything right now, I want no medications until later today, id like to relax. \"

## 2019-09-07 NOTE — PROGRESS NOTES
DAVID recevied from [facility], [facility]. Patient remains in stable condition with respirations even/unlabored. No acute distress noted at this time. Patient remains on oxygen to high flow nasal cannula. Call light remains within reach, patient encouraged to call nurse prn assist. Will continue to monitor per policy.

## 2019-09-07 NOTE — PROGRESS NOTES
Patient remains in stable condition with respirations even/unlabored. No acute distress noted. No needs noted or voiced at this time. Safety measures in place. Patient on high flow nasal cannula. Call light remains within reach. Preparing to give report to oncoming shift.

## 2019-09-07 NOTE — PROGRESS NOTES
Michele Hdz  Admission Date: 9/4/2019             Daily Progress Note: 9/7/2019    The patient's chart is reviewed and the patient is discussed with the staff. [place] a 60y.o. 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  He underwent a CT of the chest on 7/6 which revealed diffuse lower lobe predominant nodular opacities with plugging and nonspecific mediastinal and hilar lymphadenopathy.  No pulmonary embolism was noted.  He had a leukocytosis measured at 18.8k with a neutrophilic predominance.  Admission wasn't recommended and he was discharged home.       He was then seen on August 12 by Dr. Dana Phipps after completing antibiotics with Augmentin and Cipro.  At that appointment he was started on Septra, Protonix and nystatin with Magic mouthwash for oral candidiasis and sinusitis.  He was noted to have right neck discomfort and a CT of the neck was recommended if his symptoms of right neck pain continued.     He was initially seen at [facility] on August 29, when his oxygen saturation was noted to be 83% on room air. Hortencia Rucker reported he had just experienced his first night without a fever since July. Hortencia Rucker reported significant right sinus pain and congestion as well as a persistent cough.  Hospitalization was recommended, but the patient declined in favor of oral antibiotics and oxygen.  Levaquin and clindamycin were initiated and oxygen was ordered.  The oxygen was not delivered over the holiday weekend and the patient has had a very difficult course.  On Monday 9/2, Levaquin was changed to [facility] after sputum cultures revealed E.  Coli which was resistant to levaquin.     Congestion has remained heavy.  The patient remains hypoxic.  He feels weak and complains of back and chest pain.  He says that the prednisone he had previously been on for sinus congestion by Dr. James Jamil was helping but since he has completed the course he feels worse again. Truman Bojorquez also reports severe nightmares. Truman Bojorquez is willing to consider hospitalization for IV antibiotics at this time. Subjective:     Remains on 4L with sat 92%. Frequent cough with chest wall pain. Moist cough. Refused BP meds last night.      Current Facility-Administered Medications   Medication Dose Route Frequency    oxyCODONE-acetaminophen (PERCOCET 10)  mg per tablet 1 Tab  1 Tab Oral Q6H PRN    labetalol (NORMODYNE) tablet 100 mg  100 mg Oral Q6H PRN    amLODIPine (NORVASC) tablet 5 mg  5 mg Oral DAILY    albuterol (PROVENTIL VENTOLIN) nebulizer solution 2.5 mg  2.5 mg Nebulization QID RT    clindamycin (CLEOCIN) 600mg D5W 50mL IVPB (premix)  600 mg IntraVENous Q6H    NUTRITIONAL SUPPORT ELECTROLYTE PRN ORDERS   Does Not Apply PRN    sodium chloride (NS) flush 5-40 mL  5-40 mL IntraVENous Q8H    sodium chloride (NS) flush 5-40 mL  5-40 mL IntraVENous PRN    morphine injection 2 mg  2 mg IntraVENous Q4H PRN    naloxone (NARCAN) injection 0.4 mg  0.4 mg IntraVENous PRN    ondansetron (ZOFRAN) injection 4 mg  4 mg IntraVENous Q4H PRN    enoxaparin (LOVENOX) injection 40 mg  40 mg SubCUTAneous Q24H    cefepime (MAXIPIME) 2 g in 0.9% sodium chloride (MBP/ADV) 100 mL  2 g IntraVENous Q8H    sodium chloride (OCEAN) 0.65 % nasal squeeze bottle 2 Spray  2 Spray Both Nostrils Q2H PRN    methylPREDNISolone (PF) (SOLU-MEDROL) injection 40 mg  40 mg IntraVENous Q12H    sodium chloride 3% hypertonic nebulizer soln  4 mL Nebulization BID    guaiFENesin (ROBITUSSIN) 100 mg/5 mL oral liquid 400 mg  400 mg Per G Tube Q4H    acetaminophen (TYLENOL) tablet 650 mg  650 mg Per G Tube Q4H PRN    LORazepam (ATIVAN) tablet 1 mg  1 mg Per G Tube BID PRN    magic mouthwash (SIMA) oral suspension 5 mL  5 mL Swish and Spit Q4H PRN    senna-docusate (PERICOLACE) 8.6-50 mg per tablet 1 Tab  1 Tab Per G Tube DAILY PRN    temazepam (RESTORIL) capsule 30 mg  30 mg Per G Tube QHS PRN    famotidine (PEPCID) 40 mg/5 mL (8 mg/mL) oral suspension 20 mg  20 mg Per G Tube BID       Review of Systems    Constitutional: negative for fever, chills, sweats  Cardiovascular: negative for chest pain, palpitations, syncope, edema  Gastrointestinal:  negative for dysphagia, reflux, vomiting, diarrhea, abdominal pain, or melena  Neurologic:  negative for focal weakness, numbness, headache    Objective:     Vitals:    09/07/19 0047 09/07/19 0348 09/07/19 0432 09/07/19 0713   BP: (!) 159/92 (!) 179/99 (!) 170/97 (!) 159/95   Pulse: 94 96  94   Resp: 20 18 17   Temp: 98.3 °F (36.8 °C) 97.7 °F (36.5 °C)  97.6 °F (36.4 °C)   SpO2: 93% 94%  92%   Weight:             Intake/Output Summary (Last 24 hours) at 9/7/2019 6893  Last data filed at 9/7/2019 4071  Gross per 24 hour   Intake 2075 ml   Output --   Net 2075 ml       Physical Exam:   Constitution:  the patient is well developed and in no acute distress  EENMT:  Sclera clear, pupils equal, oral mucosa moist  Respiratory: scattered rhonchi bilaterally  Cardiovascular:  RRR without M,G,R  Gastrointestinal: soft and non-tender; with positive bowel sounds. Musculoskeletal: warm without cyanosis. There is no lower extremity edema. Skin:  no jaundice or rashes  Neurologic: no gross neuro deficits     Psychiatric:  alert and oriented x 3    Chest CT 9/3:             IMPRESSION:  1. Extensive tree-in-bud type opacities bilaterally with overall mild  progression. These findings most commonly or secondary to bacterial infections  other etiologies including mycobacterial infections and hypersensitivity  pneumonitis could also have this appearance.   2. Numerous coarse parenchymal calcifications within the lower lung zones  suggests prior granulomatous disease. Alveolar microlithiasis is thought to be  less likely. 3. Debris-filled bronchi will be most compatible with aspiration. Confluent  airspace opacities within the lower lobes have resolved. LAB  No results for input(s): GLUCPOC in the last 72 hours. No lab exists for component: GLPOC   Recent Labs     09/05/19  0818   WBC 33.0*   HGB 12.7*   HCT 38.8*        Recent Labs     09/05/19  0818   *   K 4.4   CL 99   CO2 27   *   BUN 12   CREA 0.54*   CA 8.5     No results for input(s): PH, PCO2, PO2, HCO3, PHI, PCO2I, PO2I, HCO3I in the last 72 hours. No results for input(s): LCAD, LAC in the last 72 hours. Bronch wash: Light GNR subculture in progress; AFB negative    CD4/8: 1.48    Results for Trent Cotto (MRN [id number]) as of 9/7/2019 09:09   Ref. Range 9/5/2019 13:00   [facility] NEUTROPHIL Latest Units: % 80   [facility] LYMPHS Latest Units: % 17   [facility] MACROPHAGES Latest Units: % 3   [facility] EOSINS Latest Units: % 0       Assessment:  (Medical Decision Making)     Hospital Problems  Date Reviewed: 9/4/2019          Codes Class Noted POA    Acute respiratory failure with hypoxemia [facility]) ICD-10-CM: J96.01  ICD-9-CM: 518.81  9/4/2019 Unknown    Currently on 4L    Pneumonia of both lungs due to Escherichia coli [facility]) ICD-10-CM: J15.5  ICD-9-CM: 482.82  9/4/2019 Unknown    On Cefepime and Clinda. Has extensive tree in bud opacities as well and bronchiectasis. Need to rule out MC or other pathogens. Await bronch results. Acute maxillary sinusitis ICD-10-CM: J01.00  ICD-9-CM: 461.0  9/4/2019 Unknown    On ABx    Ineffective airway clearance ICD-10-CM: R06.89  ICD-9-CM: 786.09  9/4/2019 Unknown    Bronched with copious white secretions removed. Aspiration, chronic pulmonary ICD-10-CM: T17.908A  ICD-9-CM: 934.9  9/4/2019 Unknown    Had silent aspiration on last MBS. Has PEG. Refused MBS yesterday          Plan:  (Medical Decision Making)     Change Percocet to q6hrs.  Hold for sedation. Follow bronch cx. Continue current ABx. Wean oxygen as tolerated. TF for nutrition. Add laxative. Decreased steroids. Check CYNTHIA before discharge to assess for evidence of significant PABLO. --    More than 50% of the time documented was spent in face-to-face contact with the patient and in the care of the patient on the floor/unit where the patient is located.     Ty Handley MD

## 2019-09-07 NOTE — PROGRESS NOTES
Patient wife reports giving patient \"liquid food from home\" that equals 4 cartons of osmolite. Will record in [employer]. No other needs noted.

## 2019-09-08 LAB
ANION GAP SERPL CALC-SCNC: 3 MMOL/L (ref 7–16)
BACTERIA SPEC CULT: NORMAL
BASOPHILS # BLD: 0 K/UL (ref 0–0.2)
BASOPHILS NFR BLD: 0 % (ref 0–2)
BUN SERPL-MCNC: 15 MG/DL (ref 8–23)
CALCIUM SERPL-MCNC: 9.2 MG/DL (ref 8.3–10.4)
CHLORIDE SERPL-SCNC: 97 MMOL/L (ref 98–107)
CO2 SERPL-SCNC: 34 MMOL/L (ref 21–32)
CREAT SERPL-MCNC: 0.55 MG/DL (ref 0.8–1.5)
DIFFERENTIAL METHOD BLD: ABNORMAL
EOSINOPHIL # BLD: 0 K/UL (ref 0–0.8)
EOSINOPHIL NFR BLD: 0 % (ref 0.5–7.8)
ERYTHROCYTE [DISTWIDTH] IN BLOOD BY AUTOMATED COUNT: 14.6 % (ref 11.9–14.6)
GLUCOSE SERPL-MCNC: 101 MG/DL (ref 65–100)
GRAM STN SPEC: NORMAL
HCT VFR BLD AUTO: 36.3 % (ref 41.1–50.3)
HGB BLD-MCNC: 11.9 G/DL (ref 13.6–17.2)
IMM GRANULOCYTES # BLD AUTO: 0.1 K/UL (ref 0–0.5)
IMM GRANULOCYTES NFR BLD AUTO: 1 % (ref 0–5)
LYMPHOCYTES # BLD: 0.9 K/UL (ref 0.5–4.6)
LYMPHOCYTES NFR BLD: 6 % (ref 13–44)
MCH RBC QN AUTO: 28.7 PG (ref 26.1–32.9)
MCHC RBC AUTO-ENTMCNC: 32.8 G/DL (ref 31.4–35)
MCV RBC AUTO: 87.7 FL (ref 79.6–97.8)
MONOCYTES # BLD: 1.3 K/UL (ref 0.1–1.3)
MONOCYTES NFR BLD: 8 % (ref 4–12)
NEUTS SEG # BLD: 13 K/UL (ref 1.7–8.2)
NEUTS SEG NFR BLD: 85 % (ref 43–78)
NRBC # BLD: 0 K/UL (ref 0–0.2)
PLATELET # BLD AUTO: 400 K/UL (ref 150–450)
PMV BLD AUTO: 10.7 FL (ref 9.4–12.3)
POTASSIUM SERPL-SCNC: 4.5 MMOL/L (ref 3.5–5.1)
RBC # BLD AUTO: 4.14 M/UL (ref 4.23–5.6)
SERVICE CMNT-IMP: NORMAL
SODIUM SERPL-SCNC: 134 MMOL/L (ref 136–145)
WBC # BLD AUTO: 15.3 K/UL (ref 4.3–11.1)

## 2019-09-08 PROCEDURE — 74011000258 HC RX REV CODE- 258: Performed by: INTERNAL MEDICINE

## 2019-09-08 PROCEDURE — 94640 AIRWAY INHALATION TREATMENT: CPT

## 2019-09-08 PROCEDURE — 94760 N-INVAS EAR/PLS OXIMETRY 1: CPT

## 2019-09-08 PROCEDURE — 74011000250 HC RX REV CODE- 250: Performed by: INTERNAL MEDICINE

## 2019-09-08 PROCEDURE — 80048 BASIC METABOLIC PNL TOTAL CA: CPT

## 2019-09-08 PROCEDURE — 74011250636 HC RX REV CODE- 250/636: Performed by: INTERNAL MEDICINE

## 2019-09-08 PROCEDURE — 85025 COMPLETE CBC W/AUTO DIFF WBC: CPT

## 2019-09-08 PROCEDURE — 36415 COLL VENOUS BLD VENIPUNCTURE: CPT

## 2019-09-08 PROCEDURE — 74011250637 HC RX REV CODE- 250/637: Performed by: INTERNAL MEDICINE

## 2019-09-08 PROCEDURE — 99232 SBSQ HOSP IP/OBS MODERATE 35: CPT | Performed by: INTERNAL MEDICINE

## 2019-09-08 PROCEDURE — 77010033678 HC OXYGEN DAILY

## 2019-09-08 PROCEDURE — 65270000029 HC RM PRIVATE

## 2019-09-08 RX ORDER — SODIUM CHLORIDE FOR INHALATION 3 %
4 VIAL, NEBULIZER (ML) INHALATION
Status: DISCONTINUED | OUTPATIENT
Start: 2019-09-08 | End: 2019-09-10 | Stop reason: HOSPADM

## 2019-09-08 RX ORDER — ALBUTEROL SULFATE 0.83 MG/ML
2.5 SOLUTION RESPIRATORY (INHALATION)
Status: DISCONTINUED | OUTPATIENT
Start: 2019-09-08 | End: 2019-09-10 | Stop reason: HOSPADM

## 2019-09-08 RX ADMIN — Medication 5 ML: at 05:55

## 2019-09-08 RX ADMIN — OXYCODONE HYDROCHLORIDE AND ACETAMINOPHEN 1 TABLET: 10; 325 TABLET ORAL at 11:31

## 2019-09-08 RX ADMIN — MORPHINE SULFATE 2 MG: 2 INJECTION, SOLUTION INTRAMUSCULAR; INTRAVENOUS at 14:23

## 2019-09-08 RX ADMIN — ENOXAPARIN SODIUM 40 MG: 40 INJECTION SUBCUTANEOUS at 17:47

## 2019-09-08 RX ADMIN — FAMOTIDINE 20 MG: 40 POWDER, FOR SUSPENSION ORAL at 09:05

## 2019-09-08 RX ADMIN — CLINDAMYCIN PHOSPHATE 600 MG: 600 INJECTION, SOLUTION INTRAVENOUS at 03:02

## 2019-09-08 RX ADMIN — CLINDAMYCIN PHOSPHATE 600 MG: 600 INJECTION, SOLUTION INTRAVENOUS at 15:04

## 2019-09-08 RX ADMIN — AMLODIPINE BESYLATE 5 MG: 5 TABLET ORAL at 09:04

## 2019-09-08 RX ADMIN — METHYLPREDNISOLONE SODIUM SUCCINATE 30 MG: 40 INJECTION, POWDER, FOR SOLUTION INTRAMUSCULAR; INTRAVENOUS at 09:02

## 2019-09-08 RX ADMIN — METHYLPREDNISOLONE SODIUM SUCCINATE 20 MG: 40 INJECTION, POWDER, FOR SOLUTION INTRAMUSCULAR; INTRAVENOUS at 20:49

## 2019-09-08 RX ADMIN — GUAIFENESIN 400 MG: 100 SOLUTION ORAL at 20:48

## 2019-09-08 RX ADMIN — ALBUTEROL SULFATE 2.5 MG: 2.5 SOLUTION RESPIRATORY (INHALATION) at 09:12

## 2019-09-08 RX ADMIN — CEFEPIME HYDROCHLORIDE 2 G: 2 INJECTION, POWDER, FOR SOLUTION INTRAVENOUS at 21:49

## 2019-09-08 RX ADMIN — GUAIFENESIN 400 MG: 100 SOLUTION ORAL at 09:03

## 2019-09-08 RX ADMIN — ALBUTEROL SULFATE 2.5 MG: 2.5 SOLUTION RESPIRATORY (INHALATION) at 15:30

## 2019-09-08 RX ADMIN — GUAIFENESIN 400 MG: 100 SOLUTION ORAL at 11:31

## 2019-09-08 RX ADMIN — OXYCODONE HYDROCHLORIDE AND ACETAMINOPHEN 1 TABLET: 10; 325 TABLET ORAL at 05:54

## 2019-09-08 RX ADMIN — Medication 5 ML: at 14:24

## 2019-09-08 RX ADMIN — OXYCODONE HYDROCHLORIDE AND ACETAMINOPHEN 1 TABLET: 10; 325 TABLET ORAL at 17:47

## 2019-09-08 RX ADMIN — SODIUM CHLORIDE 30 MG/ML INHALATION SOLUTION 4 ML: 30 SOLUTION INHALANT at 19:15

## 2019-09-08 RX ADMIN — FAMOTIDINE 20 MG: 40 POWDER, FOR SUSPENSION ORAL at 17:47

## 2019-09-08 RX ADMIN — CEFEPIME HYDROCHLORIDE 2 G: 2 INJECTION, POWDER, FOR SOLUTION INTRAVENOUS at 05:54

## 2019-09-08 RX ADMIN — ALBUTEROL SULFATE 2.5 MG: 2.5 SOLUTION RESPIRATORY (INHALATION) at 11:41

## 2019-09-08 RX ADMIN — ALBUTEROL SULFATE 2.5 MG: 2.5 SOLUTION RESPIRATORY (INHALATION) at 19:15

## 2019-09-08 RX ADMIN — SODIUM CHLORIDE 30 MG/ML INHALATION SOLUTION 4 ML: 30 SOLUTION INHALANT at 11:41

## 2019-09-08 RX ADMIN — Medication 5 ML: at 21:49

## 2019-09-08 RX ADMIN — CEFEPIME HYDROCHLORIDE 2 G: 2 INJECTION, POWDER, FOR SOLUTION INTRAVENOUS at 14:25

## 2019-09-08 RX ADMIN — MORPHINE SULFATE 2 MG: 2 INJECTION, SOLUTION INTRAMUSCULAR; INTRAVENOUS at 20:49

## 2019-09-08 RX ADMIN — CLINDAMYCIN PHOSPHATE 600 MG: 600 INJECTION, SOLUTION INTRAVENOUS at 20:49

## 2019-09-08 RX ADMIN — CLINDAMYCIN PHOSPHATE 600 MG: 600 INJECTION, SOLUTION INTRAVENOUS at 09:05

## 2019-09-08 RX ADMIN — TEMAZEPAM 30 MG: 15 CAPSULE ORAL at 21:50

## 2019-09-08 NOTE — PROGRESS NOTES
Pt is resting quietly in bed. Respirations even and unlabored. NC at 4L. No s/s of distress. Will give report to oncoming [facility].

## 2019-09-08 NOTE — PROGRESS NOTES
Pt in bed sleeping. No s/sx of distress. Scheduled meds given. Call light within reach. Bed low and locked.

## 2019-09-08 NOTE — PROGRESS NOTES
Ochoa Kebede  Admission Date: 9/4/2019             Daily Progress Note: 9/8/2019    The patient's chart is reviewed and the patient is discussed with the staff. Homa Reagan a 60y.o. 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  He underwent a CT of the chest on 7/6 which revealed diffuse lower lobe predominant nodular opacities with plugging and nonspecific mediastinal and hilar lymphadenopathy.  No pulmonary embolism was noted.  He had a leukocytosis measured at 18.8k with a neutrophilic predominance.  Admission wasn't recommended and he was discharged home.       He was then seen on August 12 by Dr. Coleen Moon after completing antibiotics with Augmentin and Cipro.  At that appointment he was started on Septra, Protonix and nystatin with Magic mouthwash for oral candidiasis and sinusitis.  He was noted to have right neck discomfort and a CT of the neck was recommended if his symptoms of right neck pain continued.     He was initially seen at [facility] on August 29, when his oxygen saturation was noted to be 83% on room air. Gracia Dove reported he had just experienced his first night without a fever since July. Gracia Balbuenavis reported significant right sinus pain and congestion as well as a persistent cough.  Hospitalization was recommended, but the patient declined in favor of oral antibiotics and oxygen.  Levaquin and clindamycin were initiated and oxygen was ordered.  The oxygen was not delivered over the holiday weekend and the patient has had a very difficult course.  On Monday 9/2, Levaquin was changed to [facility] after sputum cultures revealed E.  Coli which was resistant to levaquin.     Congestion has remained heavy.  The patient remains hypoxic.  He feels weak and complains of back and chest pain.  He says that the prednisone he had previously been on for sinus congestion by Dr. Asael Lind was helping but since he has completed the course he feels worse again. Xander Novak also reports severe nightmares. Xander Novak is willing to consider hospitalization for IV antibiotics at this time. Subjective:     Remains on 4L with sat 92%. Afebrile. BP better. Chest pain better. Got up to chair.      Current Facility-Administered Medications   Medication Dose Route Frequency    oxyCODONE-acetaminophen (PERCOCET 10)  mg per tablet 1 Tab  1 Tab Oral Q6H    methylPREDNISolone (PF) (SOLU-MEDROL) injection 30 mg  30 mg IntraVENous Q12H    labetalol (NORMODYNE) tablet 100 mg  100 mg Oral Q6H PRN    amLODIPine (NORVASC) tablet 5 mg  5 mg Oral DAILY    albuterol (PROVENTIL VENTOLIN) nebulizer solution 2.5 mg  2.5 mg Nebulization QID RT    clindamycin (CLEOCIN) 600mg D5W 50mL IVPB (premix)  600 mg IntraVENous Q6H    NUTRITIONAL SUPPORT ELECTROLYTE PRN ORDERS   Does Not Apply PRN    sodium chloride (NS) flush 5-40 mL  5-40 mL IntraVENous Q8H    sodium chloride (NS) flush 5-40 mL  5-40 mL IntraVENous PRN    morphine injection 2 mg  2 mg IntraVENous Q4H PRN    naloxone (NARCAN) injection 0.4 mg  0.4 mg IntraVENous PRN    ondansetron (ZOFRAN) injection 4 mg  4 mg IntraVENous Q4H PRN    enoxaparin (LOVENOX) injection 40 mg  40 mg SubCUTAneous Q24H    cefepime (MAXIPIME) 2 g in 0.9% sodium chloride (MBP/ADV) 100 mL  2 g IntraVENous Q8H    sodium chloride (OCEAN) 0.65 % nasal squeeze bottle 2 Spray  2 Spray Both Nostrils Q2H PRN    sodium chloride 3% hypertonic nebulizer soln  4 mL Nebulization BID    guaiFENesin (ROBITUSSIN) 100 mg/5 mL oral liquid 400 mg  400 mg Per G Tube Q4H    acetaminophen (TYLENOL) tablet 650 mg  650 mg Per G Tube Q4H PRN    LORazepam (ATIVAN) tablet 1 mg  1 mg Per G Tube BID PRN    magic mouthwash [facility]) oral suspension 5 mL  5 mL Swish and Spit Q4H PRN    senna-docusate (PERICOLACE) 8.6-50 mg per tablet 1 Tab  1 Tab Per G Tube DAILY PRN    temazepam (RESTORIL) capsule 30 mg  30 mg Per G Tube QHS PRN    famotidine (PEPCID) 40 mg/5 mL (8 mg/mL) oral suspension 20 mg  20 mg Per G Tube BID       Review of Systems    Constitutional: negative for fever, chills, sweats  Cardiovascular: negative for chest pain, palpitations, syncope, edema  Gastrointestinal:  negative for dysphagia, reflux, vomiting, diarrhea, abdominal pain, or melena  Neurologic:  negative for focal weakness, numbness, headache    Objective:     Vitals:    09/07/19 1951 09/07/19 2348 09/08/19 0334 09/08/19 0730   BP:  146/86 142/88 148/85   Pulse:  88 85 82   Resp:  19 18 18   Temp:  98.6 °F (37 °C) 97.6 °F (36.4 °C) 97.8 °F (36.6 °C)   SpO2: 93% 93% 94% 92%   Weight:             Intake/Output Summary (Last 24 hours) at 9/8/2019 0945  Last data filed at 9/8/2019 2257  Gross per 24 hour   Intake 2930 ml   Output 400 ml   Net 2530 ml       Physical Exam:   Constitution:  the patient is well developed and in no acute distress  EENMT:  Sclera clear, pupils equal, oral mucosa moist  Respiratory: decreased BS bilaterally. Much less rhonchi. Cardiovascular:  RRR without M,G,R  Gastrointestinal: soft and non-tender; with positive bowel sounds. Musculoskeletal: warm without cyanosis. There is no lower extremity edema. Skin:  no jaundice or rashes  Neurologic: no gross neuro deficits     Psychiatric:  alert and oriented x 3    Chest CT 9/3:             IMPRESSION:  1. Extensive tree-in-bud type opacities bilaterally with overall mild  progression. These findings most commonly or secondary to bacterial infections  other etiologies including mycobacterial infections and hypersensitivity  pneumonitis could also have this appearance. 2. Numerous coarse parenchymal calcifications within the lower lung zones  suggests prior granulomatous disease.  Alveolar microlithiasis is thought to be  less likely. 3. Debris-filled bronchi will be most compatible with aspiration. Confluent  airspace opacities within the lower lobes have resolved. LAB  No results for input(s): GLUCPOC in the last 72 hours. No lab exists for component: [address]     09/08/19  0652   WBC 15.3*   HGB 11.9*   HCT 36.3*        Recent Labs     09/08/19  0652   *   K 4.5   CL 97*   CO2 34*   *   BUN 15   CREA 0.55*   CA 9.2     No results for input(s): PH, PCO2, PO2, HCO3, PHI, PCO2I, PO2I, HCO3I in the last 72 hours. No results for input(s): LCAD, LAC in the last 72 hours. Bronch wash: Light GNR subculture in progress; AFB negative    CD4/8: 1.48    Results for Litzy Bowden (MRN [id number]) as of 9/7/2019 09:09   Ref. Range 9/5/2019 13:00   [facility] NEUTROPHIL Latest Units: % 80   [facility] LYMPHS Latest Units: % 17   [facility] MACROPHAGES Latest Units: % 3   [facility] EOSINS Latest Units: % 0       Assessment:  (Medical Decision Making)     Hospital Problems  Date Reviewed: 9/4/2019          Codes Class Noted POA    Acute respiratory failure with hypoxemia [facility]) ICD-10-CM: J96.01  ICD-9-CM: 518.81  9/4/2019 Unknown    Currently on 4L; wean as tolerated. Pneumonia of both lungs due to Escherichia coli [facility]) ICD-10-CM: J15.5  ICD-9-CM: 482.82  9/4/2019 Unknown    On Cefepime and Clinda. Has extensive tree in bud opacities as well and bronchiectasis. Need to rule out MC or other pathogens. Await bronch results. Acute maxillary sinusitis ICD-10-CM: J01.00  ICD-9-CM: 461.0  9/4/2019 Unknown    On ABx    Ineffective airway clearance ICD-10-CM: R06.89  ICD-9-CM: 786.09  9/4/2019 Unknown    Bronched with copious white secretions removed. Aspiration, chronic pulmonary ICD-10-CM: T17.908A  ICD-9-CM: 934.9  9/4/2019 Unknown    Had silent aspiration on last MBS. Has PEG. Refused MBS yesterday          Plan:  (Medical Decision Making)     Change Percocet to q6hrs.  Hold for sedation. Bronch cx calling NRF. Continue current ABx. Wean oxygen as tolerated. TF for nutrition. Add laxative. Decrease steroids. Check CYNTHIA before discharge to assess for evidence of significant PABLO. --    More than 50% of the time documented was spent in face-to-face contact with the patient and in the care of the patient on the floor/unit where the patient is located.     Rafael Carver MD

## 2019-09-08 NOTE — PROGRESS NOTES
Progress Note    Patient: Connor Whitney MRN: [id number]  SSN: [id number]    YOB: 1960  Age: 60 y.o. Sex: male      Admit Date: 9/4/2019    LOS: 4 days     Subjective:     From previous note:     \" Connor Whitney is a 60 y.o. male who is being seen for assuming care.      Patient was admitted on 9-4-2019 due to fever, shortness of breath and congestion symptoms with cough and hypoxia.      He has history of squamous cell cancer of hypopharynx, s/p treatment with chemotherapy and radiation treatment in 2005, with current PEG tube placement and multiple EGD for dilation with current silent aspiration.      Also with bilateral nasal polyps.      Was treated recently for pneumonia in July 2019 and August 2019.      He developed recurrent symptoms of shortness of breath and coughing and hypoxia and congestion again last week. Was treated with Levaquin and Clindamycin, without improvement. \"     Patient had bronchoscopy on 9-5-2019, a lot of purulent material was found in lower lung field and was suctioned off. Today reports feeling better. Less cough. No fever. No shaking. No chills. Objective:     Vitals:    09/07/19 1951 09/07/19 2348 09/08/19 0334 09/08/19 0730   BP:  146/86 142/88 148/85   Pulse:  88 85 82   Resp:  19 18 18   Temp:  98.6 °F (37 °C) 97.6 °F (36.4 °C) 97.8 °F (36.6 °C)   SpO2: 93% 93% 94% 92%   Weight:            Intake and Output:  Current Shift: 09/08 0701 - 09/08 1900  In: 300 [I.V.:300]  Out: -   Last three shifts: 09/06 1901 - 09/08 0700  In: 3734 [I.V.:500]  Out: 400 [Urine:400]    Physical Exam:     Physical Exam:     General:                    The patient is a male in no acute distress. Patient appears chronically ill. He is sitting up in bed. Thin. looks more energetic however. ZHCI:                                   [id number].    Eyes:                                   palpebral pallor, no scleral icterus. ENT:                                    External auricular and nasal exam within normal limits.                                             ZFIXIN membranes are moist.  Neck:                                   Supple, non-tender, no JVD. Lungs:                       decreased to auscultation bilaterally without wheezes or crackles.                                             No respiratory accessory muscle use. Heart:                                  Regular rate and rhythm, without murmurs, rubs, or gallops. Abdomen:                  Soft, non-tender, distended with normoactive bowel sounds. Presence of PEG tube in place   Genitourinary:           No tenderness over the bladder or bilateral CVAs. Extremities:               Without clubbing, cyanosis, or edema. Skin:                                    Normal color, texture, and turgor. No rashes, lesions, or jaundice. Pulses:                      Radial and dorsalis pedis pulses present 2+ bilaterally.                                               Capillary refill <2s. Neurologic:                CN II-XII grossly intact and symmetrical.                                               Moving all four extremities well with no focal deficits. Psychiatric:                 appropriate affect. Alert and oriented x 3       Lab/Data Review:    CT chest   9-3-2019  IMPRESSION:  1. Extensive tree-in-bud type opacities bilaterally with overall mild  progression. These findings most commonly or secondary to bacterial infections  other etiologies including mycobacterial infections and hypersensitivity  pneumonitis could also have this appearance. 2. Numerous coarse parenchymal calcifications within the lower lung zones  suggests prior granulomatous disease. Alveolar microlithiasis is thought to be  less likely. 3. Debris-filled bronchi will be most compatible with aspiration. Confluent  airspace opacities within the lower lobes have resolved.     Recent Results (from the past 24 hour(s))   CBC WITH AUTOMATED DIFF    Collection Time: 09/08/19  6:52 AM   Result Value Ref Range    WBC 15.3 (H) 4.3 - 11.1 K/uL    RBC 4.14 (L) 4.23 - 5.6 M/uL    HGB 11.9 (L) 13.6 - 17.2 g/dL    HCT 36.3 (L) 41.1 - 50.3 %    MCV 87.7 79.6 - 97.8 FL    MCH 28.7 26.1 - 32.9 PG    MCHC 32.8 31.4 - 35.0 g/dL    RDW 14.6 11.9 - 14.6 %    PLATELET 558 970 - 401 K/uL    MPV 10.7 9.4 - 12.3 FL    ABSOLUTE NRBC 0.00 0.0 - 0.2 K/uL    DF AUTOMATED      NEUTROPHILS 85 (H) 43 - 78 %    LYMPHOCYTES 6 (L) 13 - 44 %    MONOCYTES 8 4.0 - 12.0 %    EOSINOPHILS 0 (L) 0.5 - 7.8 %    BASOPHILS 0 0.0 - 2.0 %    IMMATURE GRANULOCYTES 1 0.0 - 5.0 %    ABS. NEUTROPHILS 13.0 (H) 1.7 - 8.2 K/UL    ABS. LYMPHOCYTES 0.9 0.5 - 4.6 K/UL    ABS. MONOCYTES 1.3 0.1 - 1.3 K/UL    ABS. EOSINOPHILS 0.0 0.0 - 0.8 K/UL    ABS. BASOPHILS 0.0 0.0 - 0.2 K/UL    ABS. IMM.  GRANS. 0.1 0.0 - 0.5 K/UL   METABOLIC PANEL, BASIC    Collection Time: 09/08/19  6:52 AM   Result Value Ref Range    Sodium 134 (L) 136 - 145 mmol/L    Potassium 4.5 3.5 - 5.1 mmol/L    Chloride 97 (L) 98 - 107 mmol/L    CO2 34 (H) 21 - 32 mmol/L    Anion gap 3 (L) 7 - 16 mmol/L    Glucose 101 (H) 65 - 100 mg/dL    BUN 15 8 - 23 MG/DL    Creatinine 0.55 (L) 0.8 - 1.5 MG/DL    GFR est AA >60 >60 ml/min/1.73m2    GFR est non-AA >60 >60 ml/min/1.73m2    Calcium 9.2 8.3 - 10.4 MG/DL       Current Facility-Administered Medications:     oxyCODONE-acetaminophen (PERCOCET 10)  mg per tablet 1 Tab, 1 Tab, Oral, Q6H, Avril Gauthier MD, 1 Tab at 09/08/19 0554    methylPREDNISolone (PF) (SOLU-MEDROL) injection 30 mg, 30 mg, IntraVENous, Q12H, Avril Gauthier MD, 30 mg at 09/07/19 2100    labetalol (NORMODYNE) tablet 100 mg, 100 mg, Oral, Q6H PRN, Stacey Garcia MD, 100 mg at 09/05/19 0833    amLODIPine (NORVASC) tablet 5 mg, 5 mg, Oral, DAILY, Stacey Garcia MD, 5 mg at 09/07/19 1115    albuterol (PROVENTIL VENTOLIN) nebulizer solution 2.5 mg, 2.5 mg, Nebulization, QID RT, Alek Singh MD, 2.5 mg at 09/07/19 1951    clindamycin (CLEOCIN) 600mg D5W 50mL IVPB (premix), 600 mg, IntraVENous, Q6H, Litzy Elizabeth MD, Last Rate: 100 mL/hr at 09/08/19 0302, 600 mg at 09/08/19 0302    NUTRITIONAL SUPPORT ELECTROLYTE PRN ORDERS, , Does Not Apply, PRN, Stacey Garcia MD    sodium chloride (NS) flush 5-40 mL, 5-40 mL, IntraVENous, Q8H, Neo Malloy MD, 5 mL at 09/08/19 0555    sodium chloride (NS) flush 5-40 mL, 5-40 mL, IntraVENous, PRN, Neo Malloy MD, 10 mL at 09/07/19 1126    morphine injection 2 mg, 2 mg, IntraVENous, Q4H PRN, Neo Malloy MD, 2 mg at 09/07/19 2100    naloxone [facility]) injection 0.4 mg, 0.4 mg, IntraVENous, PRN, Neo Malloy MD    ondansetron [facility]) injection 4 mg, 4 mg, IntraVENous, Q4H PRN, Neo Malloy MD    enoxaparin (LOVENOX) injection 40 mg, 40 mg, SubCUTAneous, Q24H, Neo Malloy MD, 40 mg at 09/07/19 1738    cefepime (MAXIPIME) 2 g in 0.9% sodium chloride (MBP/ADV) 100 mL, 2 g, IntraVENous, Q8H, Neo Malloy MD, Last Rate: 200 mL/hr at 09/08/19 0554, 2 g at 09/08/19 0554    sodium chloride (OCEAN) 0.65 % nasal squeeze bottle 2 Spray, 2 Spray, Both Nostrils, Q2H PRN, Neo Malloy MD, 2 [place] at 09/06/19 0851    sodium chloride 3% hypertonic nebulizer soln, 4 mL, Nebulization, BID, Neo Malloy MD, 4 mL at 09/07/19 1951    guaiFENesin (ROBITUSSIN) 100 mg/5 mL oral liquid 400 mg, 400 mg, Per Jeffrey Driscoll, Q4H, Neo Malloy MD, 400 mg at 09/07/19 2059    acetaminophen (TYLENOL) tablet 650 mg, 650 mg, Per G Tube, Q4H PRN, Neo Malloy MD, 650 mg at 09/04/19 2210    LORazepam (ATIVAN) tablet 1 mg, 1 mg, Per G Tube, BID PRN, Neo Malloy MD    magic mouthwash [facility]) oral suspension 5 mL, 5 mL, Swish and Spit, Q4H PRN, Neo Malloy MD    senna-docusate (PERICOLACE) 8.6-50 mg per tablet 1 Tab, 1 Tab, Per G Tube, DAILY PRN, Neo Malloy MD    temazepam (RESTORIL) capsule 30 mg, 30 mg, Per G Tube, QHS PRN, Maxwell Cancino MD, 30 mg at 09/07/19 8572    famotidine (PEPCID) 40 mg/5 mL (8 mg/mL) oral suspension 20 mg, 20 mg, Per G Tube, BID, Maxwell Cancino MD, 20 mg at 09/07/19 8021      Assessment:     Principal Problem:    Acute respiratory failure with hypoxemia ([address] (9/4/2019)    Active Problems:    Pneumonia of both lungs due to Escherichia coli ([address] (9/4/2019)      Acute maxillary sinusitis (9/4/2019)      Ineffective airway clearance (9/4/2019)      Aspiration, chronic pulmonary (9/4/2019)        Plan:     Pneumonia from chronic aspiration  Failed out-patient treatments  No sepsis   previous squamous cell cancer of hypopharynx and s/p radiation and chemotherapy. Continue Empiric IV antibiotics. Clindamycin added. Continue PEG tube feeding. On IV steroid.    On Percocet for pain control.      I have discussed the plan of care with patient.     DVT prophylaxis : Lovenox SC      Disposition plan : when able to switch to PO antibiotics and OK with pulmonary service to discharge     Signed By: Neftaly Fink MD     September 8, 2019

## 2019-09-08 NOTE — PROGRESS NOTES
DAVID recevied from [address]. Patient remains in stable condition with respirations even/unlabored. No acute distress noted at this time. Oxygen continues to high flow nasal cannula. Call light remains within reach, patient encouraged to call nurse prn assist. Will continue to monitor per policy.

## 2019-09-08 NOTE — PROGRESS NOTES
Patient remains in stable condition with respirations even/unlabored. No acute distress noted. No needs noted or voiced at this time. Safety measures in place. Patient on room air and uses nasal cannula as needed. Call light remains within reach. Preparing to give report to oncoming shift.

## 2019-09-08 NOTE — PROGRESS NOTES
Bedside report received from  Leyda Youngblood [facility]. Assessment completed. Bed is in low and locked position, with floor free of clutter. Respirations even and unlabored. Breath sounds coarse. Alert and Oriented x4. NC at 4 L O2. NC on the bed when staff was present. Call light within reach.

## 2019-09-09 LAB
BACTERIA SPEC CULT: ABNORMAL
BACTERIA SPEC CULT: ABNORMAL
BACTERIA SPEC CULT: NORMAL
BACTERIA SPEC CULT: NORMAL
GRAM STN SPEC: ABNORMAL
SERVICE CMNT-IMP: ABNORMAL
SERVICE CMNT-IMP: NORMAL
SERVICE CMNT-IMP: NORMAL

## 2019-09-09 PROCEDURE — 74011250637 HC RX REV CODE- 250/637: Performed by: INTERNAL MEDICINE

## 2019-09-09 PROCEDURE — 74011250636 HC RX REV CODE- 250/636: Performed by: INTERNAL MEDICINE

## 2019-09-09 PROCEDURE — 74011000250 HC RX REV CODE- 250: Performed by: INTERNAL MEDICINE

## 2019-09-09 PROCEDURE — 94760 N-INVAS EAR/PLS OXIMETRY 1: CPT

## 2019-09-09 PROCEDURE — 94640 AIRWAY INHALATION TREATMENT: CPT

## 2019-09-09 PROCEDURE — 77010033678 HC OXYGEN DAILY

## 2019-09-09 PROCEDURE — 99232 SBSQ HOSP IP/OBS MODERATE 35: CPT | Performed by: INTERNAL MEDICINE

## 2019-09-09 PROCEDURE — 74011000258 HC RX REV CODE- 258: Performed by: INTERNAL MEDICINE

## 2019-09-09 PROCEDURE — 65270000029 HC RM PRIVATE

## 2019-09-09 RX ADMIN — FAMOTIDINE 20 MG: 40 POWDER, FOR SUSPENSION ORAL at 08:35

## 2019-09-09 RX ADMIN — OXYCODONE HYDROCHLORIDE AND ACETAMINOPHEN 1 TABLET: 10; 325 TABLET ORAL at 17:54

## 2019-09-09 RX ADMIN — GUAIFENESIN 400 MG: 100 SOLUTION ORAL at 17:54

## 2019-09-09 RX ADMIN — ALBUTEROL SULFATE 2.5 MG: 2.5 SOLUTION RESPIRATORY (INHALATION) at 15:55

## 2019-09-09 RX ADMIN — ALBUTEROL SULFATE 2.5 MG: 2.5 SOLUTION RESPIRATORY (INHALATION) at 19:48

## 2019-09-09 RX ADMIN — OXYCODONE HYDROCHLORIDE AND ACETAMINOPHEN 1 TABLET: 10; 325 TABLET ORAL at 11:31

## 2019-09-09 RX ADMIN — ALBUTEROL SULFATE 2.5 MG: 2.5 SOLUTION RESPIRATORY (INHALATION) at 06:48

## 2019-09-09 RX ADMIN — CEFEPIME HYDROCHLORIDE 2 G: 2 INJECTION, POWDER, FOR SOLUTION INTRAVENOUS at 11:32

## 2019-09-09 RX ADMIN — MORPHINE SULFATE 2 MG: 2 INJECTION, SOLUTION INTRAMUSCULAR; INTRAVENOUS at 22:09

## 2019-09-09 RX ADMIN — Medication 10 ML: at 04:00

## 2019-09-09 RX ADMIN — Medication 10 ML: at 22:09

## 2019-09-09 RX ADMIN — TEMAZEPAM 30 MG: 15 CAPSULE ORAL at 22:15

## 2019-09-09 RX ADMIN — Medication 20 ML: at 12:00

## 2019-09-09 RX ADMIN — GUAIFENESIN 400 MG: 100 SOLUTION ORAL at 08:36

## 2019-09-09 RX ADMIN — CEFEPIME HYDROCHLORIDE 2 G: 2 INJECTION, POWDER, FOR SOLUTION INTRAVENOUS at 06:08

## 2019-09-09 RX ADMIN — GUAIFENESIN 400 MG: 100 SOLUTION ORAL at 11:31

## 2019-09-09 RX ADMIN — CLINDAMYCIN PHOSPHATE 600 MG: 600 INJECTION, SOLUTION INTRAVENOUS at 22:08

## 2019-09-09 RX ADMIN — AMLODIPINE BESYLATE 5 MG: 5 TABLET ORAL at 08:36

## 2019-09-09 RX ADMIN — SODIUM CHLORIDE 30 MG/ML INHALATION SOLUTION 4 ML: 30 SOLUTION INHALANT at 06:49

## 2019-09-09 RX ADMIN — MORPHINE SULFATE 2 MG: 2 INJECTION, SOLUTION INTRAMUSCULAR; INTRAVENOUS at 08:36

## 2019-09-09 RX ADMIN — SODIUM CHLORIDE 30 MG/ML INHALATION SOLUTION 4 ML: 30 SOLUTION INHALANT at 19:48

## 2019-09-09 RX ADMIN — CLINDAMYCIN PHOSPHATE 600 MG: 600 INJECTION, SOLUTION INTRAVENOUS at 14:22

## 2019-09-09 RX ADMIN — MORPHINE SULFATE 2 MG: 2 INJECTION, SOLUTION INTRAMUSCULAR; INTRAVENOUS at 14:22

## 2019-09-09 RX ADMIN — Medication 5 ML: at 06:09

## 2019-09-09 RX ADMIN — METHYLPREDNISOLONE SODIUM SUCCINATE 20 MG: 40 INJECTION, POWDER, FOR SOLUTION INTRAMUSCULAR; INTRAVENOUS at 22:09

## 2019-09-09 RX ADMIN — METHYLPREDNISOLONE SODIUM SUCCINATE 20 MG: 40 INJECTION, POWDER, FOR SOLUTION INTRAMUSCULAR; INTRAVENOUS at 08:35

## 2019-09-09 RX ADMIN — ENOXAPARIN SODIUM 40 MG: 40 INJECTION SUBCUTANEOUS at 17:54

## 2019-09-09 RX ADMIN — CEFEPIME HYDROCHLORIDE 2 G: 2 INJECTION, POWDER, FOR SOLUTION INTRAVENOUS at 22:08

## 2019-09-09 RX ADMIN — CLINDAMYCIN PHOSPHATE 600 MG: 600 INJECTION, SOLUTION INTRAVENOUS at 08:36

## 2019-09-09 RX ADMIN — MORPHINE SULFATE 2 MG: 2 INJECTION, SOLUTION INTRAMUSCULAR; INTRAVENOUS at 04:00

## 2019-09-09 RX ADMIN — ALBUTEROL SULFATE 2.5 MG: 2.5 SOLUTION RESPIRATORY (INHALATION) at 11:43

## 2019-09-09 NOTE — PROGRESS NOTES
Progress Note    Patient: Rico Linares MRN: [id number]  SSN: [id number]    YOB: 1960  Age: 60 y.o. Sex: male      Admit Date: 9/4/2019    LOS: 5 days     Subjective:     From previous note:     \" Rico Linares is a 60 y.o. male who is being seen for assuming care.      Patient was admitted on 9-4-2019 due to fever, shortness of breath and congestion symptoms with cough and hypoxia.      He has history of squamous cell cancer of hypopharynx, s/p treatment with chemotherapy and radiation treatment in 2005, with current PEG tube placement and multiple EGD for dilation with current silent aspiration.      Also with bilateral nasal polyps.      Was treated recently for pneumonia in July 2019 and August 2019.      He developed recurrent symptoms of shortness of breath and coughing and hypoxia and congestion again last week. Was treated with Levaquin and Clindamycin, without improvement. \"     Patient had bronchoscopy on 9-5-2019, a lot of purulent material was found in lower lung field and was suctioned off. Today reports having cough and chest pain with cough. No fever. No shaking. No chills. Objective:     Vitals:    09/09/19 0008 09/09/19 0355 09/09/19 0648 09/09/19 0720   BP: (!) 153/103 (!) 156/107  148/88   Pulse: 84 85  86   Resp: 17 18  18   Temp: 98.3 °F (36.8 °C) 98.3 °F (36.8 °C)  98.2 °F (36.8 °C)   SpO2: 99% 93% 95% 97%   Weight:            Intake and Output:  Current Shift: 09/09 0701 - 09/09 1900  In: 100   Out: -   Last three shifts: 09/07 1901 - 09/09 0700  In: 800 [I.V.:450]  Out: -     Physical Exam:     Physical Exam:     General:                    The patient is a male in no acute distress. Patient appears chronically ill. He is sitting up in bed. Thin. XHQO:                                   [id number]. Eyes:                                   palpebral pallor, no scleral icterus.   ENT:                                    External auricular and nasal exam within normal limits.                                             USCLQN membranes are moist.  Neck:                                   Supple, non-tender, no JVD. Lungs:                       decreased to auscultation bilaterally without wheezes or crackles.                                             No respiratory accessory muscle use. Heart:                                  Regular rate and rhythm, without murmurs, rubs, or gallops. Abdomen:                  Soft, non-tender, distended with normoactive bowel sounds. Presence of PEG tube in place   Genitourinary:           No tenderness over the bladder or bilateral CVAs. Extremities:               Without clubbing, cyanosis, or edema. Skin:                                    Normal color, texture, and turgor. No rashes, lesions, or jaundice. Pulses:                      Radial and dorsalis pedis pulses present 2+ bilaterally.                                               Capillary refill <2s. Neurologic:                CN II-XII grossly intact and symmetrical.                                               Moving all four extremities well with no focal deficits. Psychiatric:                 appropriate affect. Alert and oriented x 3       Lab/Data Review:    CT chest   9-3-2019  IMPRESSION:  1. Extensive tree-in-bud type opacities bilaterally with overall mild  progression. These findings most commonly or secondary to bacterial infections  other etiologies including mycobacterial infections and hypersensitivity  pneumonitis could also have this appearance. 2. Numerous coarse parenchymal calcifications within the lower lung zones  suggests prior granulomatous disease. Alveolar microlithiasis is thought to be  less likely. 3. Debris-filled bronchi will be most compatible with aspiration. Confluent  airspace opacities within the lower lobes have resolved.     No results found for this or any previous visit (from the past 24 hour(s)).     Current Facility-Administered Medications:     albuterol (PROVENTIL VENTOLIN) nebulizer solution 2.5 mg, 2.5 mg, Nebulization, QID RT, Mika Campos MD, 2.5 mg at 09/09/19 0648    methylPREDNISolone (PF) (SOLU-MEDROL) injection 20 mg, 20 mg, IntraVENous, Q12H, Mika Campos MD, 20 mg at 09/09/19 0835    sodium chloride 3% hypertonic nebulizer soln, 4 mL, Nebulization, BID RT, Alexandria Isabel MD, 4 mL at 09/09/19 0649    oxyCODONE-acetaminophen (PERCOCET 10)  mg per tablet 1 Tab, 1 Tab, Oral, Q6H, Mika Campos MD, 1 Tab at 09/08/19 1747    labetalol (NORMODYNE) tablet 100 mg, 100 mg, Oral, Q6H PRN, Stacey Garcia MD, 100 mg at 09/05/19 0833    amLODIPine (NORVASC) tablet 5 mg, 5 mg, Oral, DAILY, Stacey Garcia MD, 5 mg at 09/09/19 0836    clindamycin (CLEOCIN) 600mg D5W 50mL IVPB (premix), 600 mg, IntraVENous, Q6H, Waqar Mayen MD, Last Rate: 100 mL/hr at 09/09/19 0836, 600 mg at 09/09/19 0836    NUTRITIONAL SUPPORT ELECTROLYTE PRN ORDERS, , Does Not Apply, PRN, Leann Scherer MD    sodium chloride (NS) flush 5-40 mL, 5-40 mL, IntraVENous, Q8H, Alexandria Isabel MD, 5 mL at 09/09/19 0609    sodium chloride (NS) flush 5-40 mL, 5-40 mL, IntraVENous, PRN, Alexandria Isabel MD, 10 mL at 09/07/19 1126    morphine injection 2 mg, 2 mg, IntraVENous, Q4H PRN, Alexandria Isabel MD, 2 mg at 09/09/19 0836    naloxone (NARCAN) injection 0.4 mg, 0.4 mg, IntraVENous, PRN, Alexandria Isabel MD    [facility]) injection 4 mg, 4 mg, IntraVENous, Q4H PRN, Alexandria Isabel MD    enoxaparin (LOVENOX) injection 40 mg, 40 mg, SubCUTAneous, Q24H, Alexandria Isabel MD, 40 mg at 09/08/19 1747    cefepime (MAXIPIME) 2 g in 0.9% sodium chloride (MBP/ADV) 100 mL, 2 g, IntraVENous, Q8H, Alexandria Isabel MD, Last Rate: 200 mL/hr at 09/09/19 0608, 2 g at 09/09/19 0608    sodium chloride (OCEAN) 0.65 % nasal squeeze bottle 2 Spray, 2 Spray, Both Nostrils, Q2H PRN, Alexandria Isabel MD, 2 Spray at 09/06/19 0851    guaiFENesin (ROBITUSSIN) 100 mg/5 mL oral liquid 400 mg, 400 mg, Per Davalos Fern, Q4H, Scot Castaneda MD, 400 mg at 09/09/19 0836    acetaminophen (TYLENOL) tablet 650 mg, 650 mg, Per G Tube, Q4H PRN, Scot Castaneda MD, 650 mg at 09/04/19 2210    LORazepam (ATIVAN) tablet 1 mg, 1 mg, Per G Tube, BID PRN, Scot Castaneda MD    magic mouthwash [facility]) oral suspension 5 mL, 5 mL, Swish and Spit, Q4H PRN, Scot Castaneda MD    senna-docusate (PERICOLACE) 8.6-50 mg per tablet 1 Tab, 1 Tab, Per G Tube, DAILY PRN, Scot Castaneda MD    temazepam (RESTORIL) capsule 30 mg, 30 mg, Per G Tube, QHS PRN, Scot Castaneda MD, 30 mg at 09/08/19 2150    famotidine (PEPCID) 40 mg/5 mL (8 mg/mL) oral suspension 20 mg, 20 mg, Per G Tube, BID, Scot Castaneda MD, 20 mg at 09/09/19 4639      Assessment:     Principal Problem:    Acute respiratory failure with hypoxemia ([facility] (9/4/2019)    Active Problems:    Pneumonia of both lungs due to Escherichia coli ([facility] (9/4/2019)      Acute maxillary sinusitis (9/4/2019)      Ineffective airway clearance (9/4/2019)      Aspiration, chronic pulmonary (9/4/2019)        Plan:     Pneumonia from chronic aspiration  Failed out-patient treatments  No sepsis   previous squamous cell cancer of hypopharynx and s/p radiation and chemotherapy. Continue Empiric IV antibiotics. Clindamycin added. Continue PEG tube feeding. On IV steroid. dosage is adjusted down.    On Percocet for pain control.      I have discussed the plan of care with patient.     DVT prophylaxis : Lovenox SC      Disposition plan : when able to switch to PO antibiotics and OK with pulmonary service to discharge     Signed By: Shailesh Martinez MD     September 9, 2019

## 2019-09-09 NOTE — PROGRESS NOTES
Pt in bed resting rr are even and unlabored lung sounds are course with wheezing noted. O2 4L highflow in place pt continues to remove it at times. abd is soft bowel sounds are active PEG in place patent and clamped. Pt self feeds. osmolite 1.2. Skin in place no new areas noted. Had a c/o chest pain 9/10 given Morphine 2mg. Tolerated well. Safety measures in place will continue to monitor.

## 2019-09-09 NOTE — PROGRESS NOTES
Pt is resting quietly in bed. Respirations even and unlabored. No s/s of distress. Will give report to oncalin [id number].

## 2019-09-09 NOTE — INTERDISCIPLINARY ROUNDS
Interdisciplinary team rounds were held 9/9/2019 with the following team members:Care Management, Physical Therapy, Physician and Clinical Coordinator and the patient. Plan of care discussed. See clinical pathway and/or care plan for interventions and desired outcomes.

## 2019-09-09 NOTE — PROGRESS NOTES
Stacey Smalls  Admission Date: 9/4/2019             Daily Progress Note: 9/9/2019    The patient's chart is reviewed and the patient is discussed with the staff. Adrienne Cavazos a 60y.o. 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  He underwent a CT of the chest on 7/6 which revealed diffuse lower lobe predominant nodular opacities with plugging and nonspecific mediastinal and hilar lymphadenopathy.  No pulmonary embolism was noted.  He had a leukocytosis measured at 18.8k with a neutrophilic predominance.  Admission wasn't recommended and he was discharged home.       He was then seen on August 12 by Dr. Jenaro Hein after completing antibiotics with Augmentin and Cipro.  At that appointment he was started on Septra, Protonix and nystatin with Magic mouthwash for oral candidiasis and sinusitis.  He was noted to have right neck discomfort and a CT of the neck was recommended if his symptoms of right neck pain continued.     He was initially seen at [facility] on August 29, when his oxygen saturation was noted to be 83% on room air. Alban Pena reported he had just experienced his first night without a fever since July. Alban Pena reported significant right sinus pain and congestion as well as a persistent cough.  Hospitalization was recommended, but the patient declined in favor of oral antibiotics and oxygen.  Levaquin and clindamycin were initiated and oxygen was ordered.  The oxygen was not delivered over the holiday weekend and the patient has had a very difficult course.  On Monday 9/2, Levaquin was changed to [facility] after sputum cultures revealed E.  Coli which was resistant to levaquin.     Congestion has remained heavy.  The patient remains hypoxic.  He feels weak and complains of back and chest pain.  He says that the prednisone he had previously been on for sinus congestion by Dr. Toño Barclay was helping but since he has completed the course he feels worse again. [facility] also reports severe nightmares. [facility] is willing to consider hospitalization for IV antibiotics at this time. Subjective:     Remains on 4L with sat 92%. Afebrile. BP better. Chest pain better. Got up to chair.      Current Facility-Administered Medications   Medication Dose Route Frequency    albuterol (PROVENTIL VENTOLIN) nebulizer solution 2.5 mg  2.5 mg Nebulization QID RT    methylPREDNISolone (PF) (SOLU-MEDROL) injection 20 mg  20 mg IntraVENous Q12H    sodium chloride 3% hypertonic nebulizer soln  4 mL Nebulization BID RT    oxyCODONE-acetaminophen (PERCOCET 10)  mg per tablet 1 Tab  1 Tab Oral Q6H    labetalol (NORMODYNE) tablet 100 mg  100 mg Oral Q6H PRN    amLODIPine (NORVASC) tablet 5 mg  5 mg Oral DAILY    clindamycin (CLEOCIN) 600mg D5W 50mL IVPB (premix)  600 mg IntraVENous Q6H    NUTRITIONAL SUPPORT ELECTROLYTE PRN ORDERS   Does Not Apply PRN    sodium chloride (NS) flush 5-40 mL  5-40 mL IntraVENous Q8H    sodium chloride (NS) flush 5-40 mL  5-40 mL IntraVENous PRN    morphine injection 2 mg  2 mg IntraVENous Q4H PRN    naloxone (NARCAN) injection 0.4 mg  0.4 mg IntraVENous PRN    ondansetron (ZOFRAN) injection 4 mg  4 mg IntraVENous Q4H PRN    enoxaparin (LOVENOX) injection 40 mg  40 mg SubCUTAneous Q24H    cefepime (MAXIPIME) 2 g in 0.9% sodium chloride (MBP/ADV) 100 mL  2 g IntraVENous Q8H    sodium chloride (OCEAN) 0.65 % nasal squeeze bottle 2 Spray  2 Spray Both Nostrils Q2H PRN    guaiFENesin (ROBITUSSIN) 100 mg/5 mL oral liquid 400 mg  400 mg Per G Tube Q4H    acetaminophen (TYLENOL) tablet 650 mg  650 mg Per G Tube Q4H PRN    LORazepam (ATIVAN) tablet 1 mg  1 mg Per G Tube BID PRN    magic mouthwash [facility]) oral suspension 5 mL  5 mL Swish and Spit Q4H PRN    senna-docusate (PERICOLACE) 8.6-50 mg per tablet 1 Tab  1 Tab Per G Tube DAILY PRN    temazepam (RESTORIL) capsule 30 mg  30 mg Per G Tube QHS PRN    famotidine (PEPCID) 40 mg/5 mL (8 mg/mL) oral suspension 20 mg  20 mg Per G Tube BID       Review of Systems    Constitutional: negative for fever, chills, sweats  Cardiovascular: negative for chest pain, palpitations, syncope, edema  Gastrointestinal:  negative for dysphagia, reflux, vomiting, diarrhea, abdominal pain, or melena  Neurologic:  negative for focal weakness, numbness, headache    Objective:     Vitals:    09/09/19 0008 09/09/19 0355 09/09/19 0648 09/09/19 0720   BP: (!) 153/103 (!) 156/107  148/88   Pulse: 84 85  86   Resp: 17 18  18   Temp: 98.3 °F (36.8 °C) 98.3 °F (36.8 °C)  98.2 °F (36.8 °C)   SpO2: 99% 93% 95% 97%   Weight:             Intake/Output Summary (Last 24 hours) at 9/9/2019 1037  Last data filed at 9/9/2019 0834  Gross per 24 hour   Intake 530 ml   Output --   Net 530 ml       Physical Exam:   Constitution:  the patient is well developed and in no acute distress  EENMT:  Sclera clear, pupils equal, oral mucosa moist  Respiratory: decreased BS bilaterally. Much less rhonchi. Cardiovascular:  RRR without M,G,R  Gastrointestinal: soft and non-tender; with positive bowel sounds. Musculoskeletal: warm without cyanosis. There is no lower extremity edema. Skin:  no jaundice or rashes  Neurologic: no gross neuro deficits     Psychiatric:  alert and oriented x 3    Chest CT 9/3:             IMPRESSION:  1. Extensive tree-in-bud type opacities bilaterally with overall mild  progression. These findings most commonly or secondary to bacterial infections  other etiologies including mycobacterial infections and hypersensitivity  pneumonitis could also have this appearance. 2. Numerous coarse parenchymal calcifications within the lower lung zones  suggests prior granulomatous disease. Alveolar microlithiasis is thought to be  less likely. 3. Debris-filled bronchi will be most compatible with aspiration. Confluent  airspace opacities within the lower lobes have resolved. LAB  No results for input(s): GLUCPOC in the last 72 hours. No lab exists for component: [address]     09/08/19  0652   WBC 15.3*   HGB 11.9*   HCT 36.3*        Recent Labs     09/08/19  0652   *   K 4.5   CL 97*   CO2 34*   *   BUN 15   CREA 0.55*   CA 9.2     No results for input(s): PH, PCO2, PO2, HCO3, PHI, PCO2I, PO2I, HCO3I in the last 72 hours. No results for input(s): LCAD, LAC in the last 72 hours. Bronch wash: Light GNR subculture in progress; AFB negative    CD4/8: 1.48    Results for eKvon Fenton (MRN [id number]) as of 9/7/2019 09:09   Ref. Range 9/5/2019 13:00   [facility] NEUTROPHIL Latest Units: % 80   [facility] LYMPHS Latest Units: % 17   [facility] MACROPHAGES Latest Units: % 3   [facility] EOSINS Latest Units: % 0       Assessment:  (Medical Decision Making)     Patient Active Problem List   Diagnosis Code    Feeding difficulties and mismanagement R63.3    Acute respiratory failure with hypoxemia (HCC) J96.01    Pneumonia of both lungs due to Escherichia coli (HCC) J15.5    Acute maxillary sinusitis J01.00    Ineffective airway clearance R06.89    Aspiration, chronic pulmonary T17.908A           Plan:  (Medical Decision Making)     Hospital Problems  Date Reviewed: 9/4/2019          Codes Class Noted POA    Acute respiratory failure with hypoxemia (HCC) ICD-10-CM: J96.01  ICD-9-CM: 518.81  9/4/2019 Unknown    Currently on 4L; wean as tolerated. Pneumonia of both lungs due to Escherichia coli [facility]) ICD-10-CM: J15.5  ICD-9-CM: 482.82  9/4/2019 Unknown    On Cefepime and Clinda. Has extensive tree in bud opacities as well and bronchiectasis. Need to rule out MC or other pathogens. Await bronch results.     Acute maxillary sinusitis ICD-10-CM: J01.00  ICD-9-CM: 461.0  9/4/2019 Unknown On ABx    Ineffective airway clearance ICD-10-CM: R06.89  ICD-9-CM: 786.09  9/4/2019 Unknown    Bronched with copious white secretions removed. Aspiration, chronic pulmonary ICD-10-CM: T17.908A  ICD-9-CM: 934.9  9/4/2019 Unknown    Had silent aspiration on last MBS. Has PEG. Refused MBS yesterday          Change Percocet to q6hrs. Hold for sedation. Continue current ABx. Cefepime day#6/7  Await AFB cultures    Wean oxygen as tolerated. May need home O2 temporarily  TF for nutrition. Add laxative. Wean off steroids. More than 50% of the time documented was spent in face-to-face contact with the patient and in the care of the patient on the floor/unit where the patient is located.     Buck Mccain MD

## 2019-09-09 NOTE — PROGRESS NOTES
Pt pulled Right forearm IV out. Pt states, \" I only need sleep. \". Nurse informed him that he will need another IV placed.

## 2019-09-09 NOTE — PROGRESS NOTES
Pt in bed sleeping. Respirations present. 22G IV inserted in left forearm. Call light within reach. Bed low and locked.

## 2019-09-09 NOTE — PROGRESS NOTES
Bedside report received from  [facility], [address] [facility]. Assessment completed. Bed is in low and locked position, with floor free of clutter. Respirations even and unlabored. Breath sounds coarse diminished. Productive cough. . Alert and Oriented x4. NC at 4 L O2. Call light within reach.

## 2019-09-10 VITALS
BODY MASS INDEX: 16.71 KG/M2 | RESPIRATION RATE: 18 BRPM | DIASTOLIC BLOOD PRESSURE: 94 MMHG | OXYGEN SATURATION: 91 % | HEART RATE: 88 BPM | TEMPERATURE: 98 F | WEIGHT: 119.8 LBS | SYSTOLIC BLOOD PRESSURE: 158 MMHG

## 2019-09-10 PROCEDURE — 94760 N-INVAS EAR/PLS OXIMETRY 1: CPT

## 2019-09-10 PROCEDURE — 94640 AIRWAY INHALATION TREATMENT: CPT

## 2019-09-10 PROCEDURE — 74011250636 HC RX REV CODE- 250/636: Performed by: INTERNAL MEDICINE

## 2019-09-10 PROCEDURE — 74011250637 HC RX REV CODE- 250/637: Performed by: INTERNAL MEDICINE

## 2019-09-10 PROCEDURE — 74011000258 HC RX REV CODE- 258: Performed by: INTERNAL MEDICINE

## 2019-09-10 PROCEDURE — 74011000250 HC RX REV CODE- 250: Performed by: INTERNAL MEDICINE

## 2019-09-10 PROCEDURE — 99232 SBSQ HOSP IP/OBS MODERATE 35: CPT | Performed by: INTERNAL MEDICINE

## 2019-09-10 RX ORDER — AMOXICILLIN AND CLAVULANATE POTASSIUM 875; 125 MG/1; MG/1
1 TABLET, FILM COATED ORAL 2 TIMES DAILY
Qty: 14 TAB | Refills: 0 | Status: ON HOLD | OUTPATIENT
Start: 2019-09-10 | End: 2019-10-03 | Stop reason: SDUPTHER

## 2019-09-10 RX ORDER — PREDNISONE 10 MG/1
TABLET ORAL
Qty: 9 TAB | Refills: 0 | Status: SHIPPED | OUTPATIENT
Start: 2019-09-10 | End: 2019-09-16

## 2019-09-10 RX ORDER — HYDROCODONE BITARTRATE AND ACETAMINOPHEN 10; 325 MG/1; MG/1
1 TABLET ORAL
Qty: 15 TAB | Refills: 0 | Status: SHIPPED | OUTPATIENT
Start: 2019-09-10 | End: 2019-09-13 | Stop reason: SDUPTHER

## 2019-09-10 RX ADMIN — ALBUTEROL SULFATE 2.5 MG: 2.5 SOLUTION RESPIRATORY (INHALATION) at 07:44

## 2019-09-10 RX ADMIN — CEFEPIME HYDROCHLORIDE 2 G: 2 INJECTION, POWDER, FOR SOLUTION INTRAVENOUS at 06:10

## 2019-09-10 RX ADMIN — Medication 10 ML: at 06:11

## 2019-09-10 RX ADMIN — GUAIFENESIN 400 MG: 100 SOLUTION ORAL at 10:00

## 2019-09-10 RX ADMIN — MORPHINE SULFATE 2 MG: 2 INJECTION, SOLUTION INTRAMUSCULAR; INTRAVENOUS at 06:13

## 2019-09-10 RX ADMIN — CLINDAMYCIN PHOSPHATE 600 MG: 600 INJECTION, SOLUTION INTRAVENOUS at 10:00

## 2019-09-10 RX ADMIN — ALBUTEROL SULFATE 2.5 MG: 2.5 SOLUTION RESPIRATORY (INHALATION) at 11:26

## 2019-09-10 RX ADMIN — MORPHINE SULFATE 2 MG: 2 INJECTION, SOLUTION INTRAMUSCULAR; INTRAVENOUS at 10:00

## 2019-09-10 RX ADMIN — AMLODIPINE BESYLATE 5 MG: 5 TABLET ORAL at 09:52

## 2019-09-10 RX ADMIN — SODIUM CHLORIDE 30 MG/ML INHALATION SOLUTION 4 ML: 30 SOLUTION INHALANT at 07:44

## 2019-09-10 RX ADMIN — OXYCODONE HYDROCHLORIDE AND ACETAMINOPHEN 1 TABLET: 10; 325 TABLET ORAL at 13:37

## 2019-09-10 RX ADMIN — CLINDAMYCIN PHOSPHATE 600 MG: 600 INJECTION, SOLUTION INTRAVENOUS at 03:07

## 2019-09-10 RX ADMIN — FAMOTIDINE 20 MG: 40 POWDER, FOR SUSPENSION ORAL at 10:00

## 2019-09-10 RX ADMIN — METHYLPREDNISOLONE SODIUM SUCCINATE 20 MG: 40 INJECTION, POWDER, FOR SOLUTION INTRAMUSCULAR; INTRAVENOUS at 09:52

## 2019-09-10 NOTE — PROGRESS NOTES
Pt in bed sleeping. Alert and Ox4. No s/sx of distress. Bed low and locked. Call light within reach. Scheduled meds given.

## 2019-09-10 NOTE — PROGRESS NOTES
Pt is resting quietly in bed. Respirations even and unlabored. No s/s of distress. Will give report to oncalin 7a- 7p RN, April.

## 2019-09-10 NOTE — PROGRESS NOTES
Bedside report received from  [facility]. Assessment completed. Bed is in low and locked position, with floor free of clutter. Respirations even and unlabored. Breath sounds coarse. Productive cough. Pt up ad elina walking in hallway on room air without NC. Alert and Oriented x4. Call light within reach.

## 2019-09-10 NOTE — PROGRESS NOTES
Pt in room alert and rr are even and unlabored he has removed his O2 O2 sat is 93%. Lung sounds are course with wheezing noted. Abd is soft bowel sounds are active PEG in place and patent. He remains NPO. Skin intact no new issues noted. Safety measures in place will continue to monitor.

## 2019-09-10 NOTE — PROGRESS NOTES
Pt discharged home via private car. He is alert and oriented. rr are even and unlabored pt refuses to wear O2 at current time he stated he will have it as he needs it. He is leaving with a home O2 tank. He is leaving with all his belongings. He is stable.

## 2019-09-10 NOTE — DISCHARGE INSTRUCTIONS
Patient Education        Pneumonia: Care Instructions  Your Care Instructions    Pneumonia is an infection of the lungs. Most cases are caused by infections from bacteria or viruses. Pneumonia may be mild or very severe. If it is caused by bacteria, you will be treated with antibiotics. It may take a few weeks to a few months to recover fully from pneumonia, depending on how sick you were and whether your overall health is good. Follow-up care is a key part of your treatment and safety. Be sure to make and go to all appointments, and call your doctor if you are having problems. It's also a good idea to know your test results and keep a list of the medicines you take. How can you care for yourself at home? · Take your antibiotics exactly as directed. Do not stop taking the medicine just because you are feeling better. You need to take the full course of antibiotics. · Take your medicines exactly as prescribed. Call your doctor if you think you are having a problem with your medicine. · Get plenty of rest and sleep. You may feel weak and tired for a while, but your energy level will improve with time. · To prevent dehydration, drink plenty of fluids, enough so that your urine is light yellow or clear like water. Choose water and other caffeine-free clear liquids until you feel better. If you have kidney, heart, or liver disease and have to limit fluids, talk with your doctor before you increase the amount of fluids you drink. · Take care of your cough so you can rest. A cough that brings up mucus from your lungs is common with pneumonia. It is one way your body gets rid of the infection. But if coughing keeps you from resting or causes severe fatigue and chest-wall pain, talk to your doctor. He or she may suggest that you take a medicine to reduce the cough. · Use a vaporizer or humidifier to add moisture to your bedroom. Follow the directions for cleaning the machine.   · Do not smoke or allow others to smoke around you. Smoke will make your cough last longer. If you need help quitting, talk to your doctor about stop-smoking programs and medicines. These can increase your chances of quitting for good. · Take an over-the-counter pain medicine, such as acetaminophen (Tylenol), ibuprofen (Advil, Motrin), or naproxen (Aleve). Read and follow all instructions on the label. · Do not take two or more pain medicines at the same time unless the doctor told you to. Many pain medicines have acetaminophen, which is Tylenol. Too much acetaminophen (Tylenol) can be harmful. · If you were given a spirometer to measure how well your lungs are working, use it as instructed. This can help your doctor tell how your recovery is going. · To prevent pneumonia in the future, talk to your doctor about getting a flu vaccine (once a year) and a pneumococcal vaccine (one time only for most people). When should you call for help? Call 911 anytime you think you may need emergency care. For example, call if:    · You have severe trouble breathing.    Call your doctor now or seek immediate medical care if:    · You cough up dark brown or bloody mucus (sputum).     · You have new or worse trouble breathing.     · You are dizzy or lightheaded, or you feel like you may faint.    Watch closely for changes in your health, and be sure to contact your doctor if:    · You have a new or higher fever.     · You are coughing more deeply or more often.     · You are not getting better after 2 days (48 hours).     · You do not get better as expected. Where can you learn more? Go to http://beckie-isaias.info/. Enter [id number] in the search box to learn more about \"Pneumonia: Care Instructions. \"  Current as of: September 5, 2018  Content Version: 12.1  © 6528-8806 [employer]. Care instructions adapted under license by [employer] (which disclaims liability or warranty for this information).  If you have questions about a medical condition or this instruction, always ask your healthcare professional. [address] any warranty or liability for your use of this information. DISCHARGE SUMMARY from Nurse    PATIENT INSTRUCTIONS:    After general anesthesia or intravenous sedation, for 24 hours or while taking prescription Narcotics:  · Limit your activities  · Do not drive and operate hazardous machinery  · Do not make important personal or business decisions  · Do  not drink alcoholic beverages  · If you have not urinated within 8 hours after discharge, please contact your surgeon on call. Report the following to your surgeon:  · Excessive pain, swelling, redness or odor of or around the surgical area  · Temperature over 100.5  · Nausea and vomiting lasting longer than 4 hours or if unable to take medications  · Any signs of decreased circulation or nerve impairment to extremity: change in color, persistent  numbness, tingling, coldness or increase pain  · Any questions    What to do at Home:    *  Please give a list of your current medications to your Primary Care Provider. *  Please update this list whenever your medications are discontinued, doses are      changed, or new medications (including over-the-counter products) are added. *  Please carry medication information at all times in case of emergency situations. These are general instructions for a healthy lifestyle:    No smoking/ No tobacco products/ Avoid exposure to second hand smoke  Surgeon General's Warning:  Quitting smoking now greatly reduces serious risk to your health.     Obesity, smoking, and sedentary lifestyle greatly increases your risk for illness    A healthy diet, regular physical exercise & weight monitoring are important for maintaining a healthy lifestyle    You may be retaining fluid if you have a history of heart failure or if you experience any of the following symptoms:  Weight gain of 3 pounds or more overnight or 5 pounds in a week, increased swelling in our hands or feet or shortness of breath while lying flat in bed. Please call your doctor as soon as you notice any of these symptoms; do not wait until your next office visit. The discharge information has been reviewed with the patient. The patient verbalized understanding. Discharge medications reviewed with the patient and appropriate educational materials and side effects teaching were provided.   ___________________________________________________________________________________________________________________________________

## 2019-09-10 NOTE — DISCHARGE SUMMARY
Hospitalist Discharge Summary     Patient ID:  Herminio Davis  [id number]  52 y.o.  1960  Admit date: 9/4/2019  5:54 PM  Discharge date and time: 9/10/2019  Attending: Grady Hawk MD  PCP:  Maura Pillai MD  Treatment Team: Attending Provider: Grady Hawk MD; Consulting Provider: Grady Hawk MD; Care Manager: Mare Rios RN; Consulting Provider: Grazyna Sanchez MD; Utilization Review: Nancy Alvarez RN; Primary Nurse: Christina Judge    Principal Diagnosis Acute respiratory failure with hypoxemia [facility])   Principal Problem:    Acute respiratory failure with hypoxemia ([facility] (9/4/2019)    Active Problems:    Pneumonia of both lungs due to Escherichia coli ([facility] (9/4/2019)      Acute maxillary sinusitis (9/4/2019)      Ineffective airway clearance (9/4/2019)      Aspiration, chronic pulmonary (9/4/2019)             Hospital Course:  Please refer to the admission H&P for details of presentation. In summary, the patient is a 64yo M with hx SCC of the hypopharynx s/p chemo/rads and PEG in place. He was admitted with recurrent shortness of breat and hypoxia refractory to outpatient antibiotics. Pt is known to silently aspirate and does not comply with strict NPO despite encouragement and refused while inpatient to have a repeat MBS. Bronchoscopy 9/5 with suctioning of purulent material, culture with e coli resistant to levaquin. Completed 7d cefepeme, per pulm recommendation will finish total 14d with augmentin. The patient is at ongoing risk of continued aspiration and repeat infections. Started on antihypertensives for elevated pressures while inpatient, review of outpatient records with normal to low BP. Will not initiate outpatient antihypertensives but can be followed at PCP.     Significant Diagnostic Studies:   No orders to display         Labs: Results:       Chemistry Recent Labs     09/08/19  0652   *   *   K 4.5   CL 97*   CO2 34* BUN 15   CREA 0.55*   CA 9.2   AGAP 3*      CBC w/Diff Recent Labs     09/08/19  0652   WBC 15.3*   RBC 4.14*   HGB 11.9*   HCT 36.3*      GRANS 85*   LYMPH 6*   EOS 0*      Cardiac Enzymes No results for input(s): CPK, CKND1, ALEXEI in the last 72 hours. No lab exists for component: CKRMB, TROIP   Coagulation No results for input(s): PTP, INR, APTT in the last 72 hours. No lab exists for component: INREXT    Lipid Panel No results found for: CHOL, CHOLPOCT, CHOLX, CHLST, CHOLV, 835500, HDL, HDLP, LDL, LDLC, DLDLP, 223793, VLDLC, VLDL, TGLX, TRIGL, TRIGP, TGLPOCT, CHHD, CHHDX   BNP No results for input(s): BNPP in the last 72 hours. Liver Enzymes No results for input(s): TP, ALB, TBIL, AP, SGOT, GPT in the last 72 hours. No lab exists for component: DBIL   Thyroid Studies Lab Results   Component Value Date/Time    T4, Total 8.4 11/14/2018 03:26 PM    T3 Uptake 29 11/14/2018 03:26 PM    TSH 6.630 (H) 11/14/2018 03:26 PM            Discharge Exam:  Visit Vitals  BP (!) 158/94 (BP 1 Location: Right arm, BP Patient Position: Head of bed elevated (Comment degrees))   Pulse 88   Temp 98 °F (36.7 °C)   Resp 18   Wt 54.3 kg (119 lb 12.8 oz)   SpO2 91%   BMI 16.71 kg/m²     General appearance: thin, chronically ill appearing  Lungs: coarse, no distress on RA  Heart: regular rate and rhythm, S1, S2 normal, no murmur, click, rub or gallop  Abdomen: soft, non-tender. Bowel sounds normal. No masses,  no organomegaly  Extremities: no cyanosis or edema  Neurologic: Grossly normal    Disposition: home  Discharge Condition: stable  Patient Instructions:   Current Discharge Medication List      START taking these medications    Details   amoxicillin-clavulanate (AUGMENTIN) 875-125 mg per tablet Take 1 Tab by mouth two (2) times a day.   Qty: 14 Tab, Refills: 0         CONTINUE these medications which have CHANGED    Details   HYDROcodone-acetaminophen (NORCO)  mg tablet Take 1 Tab by mouth every six (6) hours as needed for Pain for up to 5 days. Max Daily Amount: 4 Tabs. Qty: 15 Tab, Refills: 0    Associated Diagnoses: Chest wall pain      predniSONE (DELTASONE) 10 mg tablet Take 20 mg by mouth daily (with breakfast) for 3 days, THEN 10 mg daily (with breakfast) for 3 days. Qty: 9 Tab, Refills: 0         CONTINUE these medications which have NOT CHANGED    Details   magic mouthwash solution Magic Mouthwash wo Xylocaine  Hydrocortisone powder 15 mg  Nystatin 15 cc  Benadryl Elixir 30 cc  Tetracycline 500mg QS to 120 cc with water    1 teaspoon swish and spit out after meals and at bedtime  Qty: 200 mL, Refills: 2    Associated Diagnoses: Oral mucositis      pantoprazole (PROTONIX) 40 mg tablet Take 1 Tab by mouth daily for 30 days. Qty: 30 Tab, Refills: 12    Associated Diagnoses: Chronic GERD      thyroid, Pork, (ARMOUR THYROID) 15 mg tablet 1 q daily  Qty: 90 Tab, Refills: 3         STOP taking these medications       cefpodoxime (VANTIN) 200 mg tablet Comments:   Reason for Stopping:         clindamycin (CLEOCIN) 300 mg capsule Comments:   Reason for Stopping:         trimethoprim-sulfamethoxazole (BACTRIM DS, SEPTRA DS) 160-800 mg per tablet Comments:   Reason for Stopping:               Activity: Activity as tolerated  Diet: PEG feeding, strict NPO    Follow-up:     Follow-up Appointments   Procedures    FOLLOW UP VISIT Appointment in: One Week PCP     PCP     Standing Status:   Standing     Number of Occurrences:   1     Order Specific Question:   Appointment in     Answer:    One Week           Time spent to discharge patient 35 minutes  Signed:  Alex Madison MD  9/10/2019  12:51 PM

## 2019-09-10 NOTE — PROGRESS NOTES
Benigno Berrios  Admission Date: 9/4/2019             Daily Progress Note: 9/10/2019    The patient's chart is reviewed and the patient is discussed with the staff. James Hess a 60y.o. 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  He underwent a CT of the chest on 7/6 which revealed diffuse lower lobe predominant nodular opacities with plugging and nonspecific mediastinal and hilar lymphadenopathy.  No pulmonary embolism was noted.  He had a leukocytosis measured at 18.8k with a neutrophilic predominance.  Admission wasn't recommended and he was discharged home.       He was then seen on August 12 by Dr. Fartun Solis after completing antibiotics with Augmentin and Cipro.  At that appointment he was started on Septra, Protonix and nystatin with Magic mouthwash for oral candidiasis and sinusitis.  He was noted to have right neck discomfort and a CT of the neck was recommended if his symptoms of right neck pain continued.     He was initially seen at [facility] on August 29, when his oxygen saturation was noted to be 83% on room air. [facility] reported he had just experienced his first night without a fever since July. [facility] reported significant right sinus pain and congestion as well as a persistent cough.  Hospitalization was recommended, but the patient declined in favor of oral antibiotics and oxygen.  Levaquin and clindamycin were initiated and oxygen was ordered.  The oxygen was not delivered over the holiday weekend and the patient has had a very difficult course.  On Monday 9/2, Levaquin was changed to [facility] after sputum cultures revealed E.  Coli which was resistant to levaquin.     Congestion has remained heavy.  The patient remains hypoxic.  He feels weak and complains of back and chest pain.  He says that the prednisone he had previously been on for sinus congestion by Dr. Kezia Shepard was helping but since he has completed the course he feels worse again. Kezia Geiger also reports severe nightmares. Kezia Geiger is willing to consider hospitalization for IV antibiotics at this time. Subjective: On RA with sat 92% at rest. Afebrile.    Wants to know when he is going home    Current Facility-Administered Medications   Medication Dose Route Frequency    cefTRIAXone (ROCEPHIN) 1 g in 0.9% sodium chloride (MBP/ADV) 50 mL  1 g IntraVENous Q24H    albuterol (PROVENTIL VENTOLIN) nebulizer solution 2.5 mg  2.5 mg Nebulization QID RT    methylPREDNISolone (PF) (SOLU-MEDROL) injection 20 mg  20 mg IntraVENous Q12H    sodium chloride 3% hypertonic nebulizer soln  4 mL Nebulization BID RT    oxyCODONE-acetaminophen (PERCOCET 10)  mg per tablet 1 Tab  1 Tab Oral Q6H    labetalol (NORMODYNE) tablet 100 mg  100 mg Oral Q6H PRN    amLODIPine (NORVASC) tablet 5 mg  5 mg Oral DAILY    NUTRITIONAL SUPPORT ELECTROLYTE PRN ORDERS   Does Not Apply PRN    sodium chloride (NS) flush 5-40 mL  5-40 mL IntraVENous Q8H    sodium chloride (NS) flush 5-40 mL  5-40 mL IntraVENous PRN    morphine injection 2 mg  2 mg IntraVENous Q4H PRN    naloxone (NARCAN) injection 0.4 mg  0.4 mg IntraVENous PRN    ondansetron (ZOFRAN) injection 4 mg  4 mg IntraVENous Q4H PRN    enoxaparin (LOVENOX) injection 40 mg  40 mg SubCUTAneous Q24H    sodium chloride (OCEAN) 0.65 % nasal squeeze bottle 2 Spray  2 Spray Both Nostrils Q2H PRN    guaiFENesin (ROBITUSSIN) 100 mg/5 mL oral liquid 400 mg  400 mg Per G Tube Q4H    acetaminophen (TYLENOL) tablet 650 mg  650 mg Per G Tube Q4H PRN    LORazepam (ATIVAN) tablet 1 mg  1 mg Per G Tube BID PRN    magic mouthwash (SIMA) oral suspension 5 mL  5 mL Swish and Spit Q4H PRN  senna-docusate (PERICOLACE) 8.6-50 mg per tablet 1 Tab  1 Tab Per G Tube DAILY PRN    temazepam (RESTORIL) capsule 30 mg  30 mg Per G Tube QHS PRN    famotidine (PEPCID) 40 mg/5 mL (8 mg/mL) oral suspension 20 mg  20 mg Per G Tube BID       Review of Systems    Constitutional: negative for fever, chills, sweats  Cardiovascular: negative for chest pain, palpitations, syncope, edema  Gastrointestinal:  negative for dysphagia, reflux, vomiting, diarrhea, abdominal pain, or melena  Neurologic:  negative for focal weakness, numbness, headache    Objective:     Vitals:    09/10/19 0335 09/10/19 0656 09/10/19 0744 09/10/19 1115   BP: (!) 129/92 132/86  (!) 158/94   Pulse: 90 85  88   Resp: 17 18  18   Temp: 97.6 °F (36.4 °C) 98.2 °F (36.8 °C)  98 °F (36.7 °C)   SpO2: 93% 95% 94% 94%   Weight:             Intake/Output Summary (Last 24 hours) at 9/10/2019 1123  Last data filed at 9/10/2019 0854  Gross per 24 hour   Intake 430 ml   Output --   Net 430 ml       Physical Exam:   Constitution:  the patient is well developed and in no acute distress  EENMT:  Sclera clear, pupils equal, oral mucosa moist  Respiratory: decreased BS bilaterally. Much less rhonchi. Cardiovascular:  RRR without M,G,R  Gastrointestinal: soft and non-tender; with positive bowel sounds. Musculoskeletal: warm without cyanosis. There is no lower extremity edema. Skin:  no jaundice or rashes  Neurologic: no gross neuro deficits     Psychiatric:  alert and oriented x 3    Chest CT 9/3:             IMPRESSION:  1. Extensive tree-in-bud type opacities bilaterally with overall mild  progression. These findings most commonly or secondary to bacterial infections  other etiologies including mycobacterial infections and hypersensitivity  pneumonitis could also have this appearance. 2. Numerous coarse parenchymal calcifications within the lower lung zones  suggests prior granulomatous disease. Alveolar microlithiasis is thought to be  less likely.   3. Debris-filled bronchi will be most compatible with aspiration. Confluent  airspace opacities within the lower lobes have resolved. LAB  No results for input(s): GLUCPOC in the last 72 hours. No lab exists for component: [address]     09/08/19  0652   WBC 15.3*   HGB 11.9*   HCT 36.3*        Recent Labs     09/08/19  0652   *   K 4.5   CL 97*   CO2 34*   *   BUN 15   CREA 0.55*   CA 9.2     No results for input(s): PH, PCO2, PO2, HCO3, PHI, PCO2I, PO2I, HCO3I in the last 72 hours. No results for input(s): LCAD, LAC in the last 72 hours. Bronch wash: Light GNR subculture in progress; AFB negative    CD4/8: 1.48    Results for [place] (MRN [id number]) as of 9/7/2019 09:09   Ref. Range 9/5/2019 13:00   [facility] NEUTROPHIL Latest Units: % 80   [facility] LYMPHS Latest Units: % 17   [facility] MACROPHAGES Latest Units: % 3   [facility] EOSINS Latest Units: % 0       Assessment:  (Medical Decision Making)     Patient Active Problem List   Diagnosis Code    Feeding difficulties and mismanagement R63.3    Acute respiratory failure with hypoxemia ([facility]) J96.01    Pneumonia of both lungs due to Escherichia coli ([facility]) J15.5    Acute maxillary sinusitis J01.00    Ineffective airway clearance R06.89    Aspiration, chronic pulmonary T17.908A           Plan:  (Medical Decision Making)     Hospital Problems  Date Reviewed: 9/4/2019          Codes Class Noted POA    Acute respiratory failure with hypoxemia [facility]) ICD-10-CM: J96.01  ICD-9-CM: 518.81  9/4/2019 Unknown    Currently on RA at rest       Pneumonia of both lungs due to Escherichia coli [facility]) ICD-10-CM: J15.5  ICD-9-CM: 482.82  9/4/2019 Unknown    On Cefepime and Clinda. Has extensive tree in bud opacities as well and bronchiectasis. Need to rule out MC or other pathogens.        Acute maxillary sinusitis ICD-10-CM: J01.00  ICD-9-CM: 461.0  9/4/2019 Unknown    On ABx    Ineffective airway clearance ICD-10-CM: R06.89  ICD-9-CM: 786.09  9/4/2019 Unknown    Bronched with copious white secretions removed. Aspiration, chronic pulmonary ICD-10-CM: T17.908A  ICD-9-CM: 934.9  9/4/2019 Unknown    Had silent aspiration on last MBS. Has PEG. Refused MBS             Continue current ABx. Cefepime day#7 along with Clindamycin. Change to Rocephin based on bronch culture with E. Coli only. Total ABX is 10-14 days and can change to Augmentin at discharge  Await AFB cultures  TF for nutrition. Wean steroids. More than 50% of the time documented was spent in face-to-face contact with the patient and in the care of the patient on the floor/unit where the patient is located.     Jane Osorio MD

## 2019-09-11 LAB
FUNGUS CULTURE, RFCO2T: POSITIVE
FUNGUS SMEAR, RFCO1T: ABNORMAL
FUNGUS SPEC CULT: NORMAL
FUNGUS STAIN, 188244: NORMAL
REFLEX TO ID, RFCO3T: ABNORMAL
SPECIMEN SOURCE: ABNORMAL
SPECIMEN SOURCE: NORMAL

## 2019-09-12 LAB
FUNGUS ID 1, (DID), RFIM1T: NORMAL
SPECIMEN SOURCE: NORMAL

## 2019-09-15 LAB
BACTERIA SPEC CULT: NORMAL
SERVICE CMNT-IMP: NORMAL

## 2019-09-16 LAB
Lab: NORMAL
REFERENCE LAB,REFLB: NORMAL
TEST DESCRIPTION:,ATST: NORMAL

## 2019-10-02 ENCOUNTER — APPOINTMENT (OUTPATIENT)
Dept: GENERAL RADIOLOGY | Age: 59
DRG: 177 | End: 2019-10-02
Attending: EMERGENCY MEDICINE
Payer: COMMERCIAL

## 2019-10-02 ENCOUNTER — HOSPITAL ENCOUNTER (INPATIENT)
Age: 59
LOS: 3 days | Discharge: HOME | DRG: 177 | End: 2019-10-05
Attending: EMERGENCY MEDICINE | Admitting: HOSPITALIST
Payer: COMMERCIAL

## 2019-10-02 DIAGNOSIS — R91.8 PULMONARY INFILTRATES: ICD-10-CM

## 2019-10-02 DIAGNOSIS — J32.8 OTHER CHRONIC SINUSITIS: ICD-10-CM

## 2019-10-02 DIAGNOSIS — Z85.21 H/O LARYNGEAL CANCER: Chronic | ICD-10-CM

## 2019-10-02 DIAGNOSIS — J18.9 PNEUMONIA OF RIGHT LOWER LOBE DUE TO INFECTIOUS ORGANISM: Primary | ICD-10-CM

## 2019-10-02 DIAGNOSIS — T17.908S CHRONIC PULMONARY ASPIRATION, SEQUELA: Chronic | ICD-10-CM

## 2019-10-02 DIAGNOSIS — J96.21 ACUTE AND CHRONIC RESPIRATORY FAILURE WITH HYPOXIA (HCC): ICD-10-CM

## 2019-10-02 DIAGNOSIS — R13.19 OTHER DYSPHAGIA: Chronic | ICD-10-CM

## 2019-10-02 PROBLEM — J69.0 ASPIRATION PNEUMONIA (HCC): Status: ACTIVE | Noted: 2019-10-02

## 2019-10-02 LAB
ALBUMIN SERPL-MCNC: 3.1 G/DL (ref 3.2–4.6)
ALBUMIN/GLOB SERPL: 0.7 {RATIO} (ref 1.2–3.5)
ALP SERPL-CCNC: 69 U/L (ref 50–136)
ALT SERPL-CCNC: 11 U/L (ref 12–65)
ANION GAP SERPL CALC-SCNC: 6 MMOL/L (ref 7–16)
ARTERIAL PATENCY WRIST A: YES
AST SERPL-CCNC: 8 U/L (ref 15–37)
ATRIAL RATE: 85 BPM
BASE EXCESS BLD CALC-SCNC: 1 MMOL/L
BASOPHILS # BLD: 0 K/UL (ref 0–0.2)
BASOPHILS NFR BLD: 0 % (ref 0–2)
BDY SITE: ABNORMAL
BILIRUB SERPL-MCNC: 0.5 MG/DL (ref 0.2–1.1)
BODY TEMPERATURE: 98.6
BUN SERPL-MCNC: 11 MG/DL (ref 8–23)
CALCIUM SERPL-MCNC: 9.1 MG/DL (ref 8.3–10.4)
CALCULATED P AXIS, ECG09: 75 DEGREES
CALCULATED R AXIS, ECG10: 73 DEGREES
CALCULATED T AXIS, ECG11: 82 DEGREES
CHLORIDE SERPL-SCNC: 99 MMOL/L (ref 98–107)
CO2 BLD-SCNC: 28 MMOL/L
CO2 SERPL-SCNC: 30 MMOL/L (ref 21–32)
COLLECT TIME,HTIME: 1947
CREAT SERPL-MCNC: 0.65 MG/DL (ref 0.8–1.5)
DIAGNOSIS, 93000: NORMAL
DIFFERENTIAL METHOD BLD: ABNORMAL
EOSINOPHIL # BLD: 0 K/UL (ref 0–0.8)
EOSINOPHIL NFR BLD: 0 % (ref 0.5–7.8)
ERYTHROCYTE [DISTWIDTH] IN BLOOD BY AUTOMATED COUNT: 17.2 % (ref 11.9–14.6)
GAS FLOW.O2 O2 DELIVERY SYS: ABNORMAL L/MIN
GLOBULIN SER CALC-MCNC: 4.6 G/DL (ref 2.3–3.5)
GLUCOSE SERPL-MCNC: 125 MG/DL (ref 65–100)
HCO3 BLD-SCNC: 26.4 MMOL/L (ref 22–26)
HCT VFR BLD AUTO: 40.1 % (ref 41.1–50.3)
HGB BLD-MCNC: 12.5 G/DL (ref 13.6–17.2)
IMM GRANULOCYTES # BLD AUTO: 0.1 K/UL (ref 0–0.5)
IMM GRANULOCYTES NFR BLD AUTO: 0 % (ref 0–5)
LACTATE BLD-SCNC: 1.12 MMOL/L (ref 0.5–1.9)
LYMPHOCYTES # BLD: 0.7 K/UL (ref 0.5–4.6)
LYMPHOCYTES NFR BLD: 5 % (ref 13–44)
MCH RBC QN AUTO: 28.8 PG (ref 26.1–32.9)
MCHC RBC AUTO-ENTMCNC: 31.2 G/DL (ref 31.4–35)
MCV RBC AUTO: 92.4 FL (ref 79.6–97.8)
MONOCYTES # BLD: 0.3 K/UL (ref 0.1–1.3)
MONOCYTES NFR BLD: 2 % (ref 4–12)
NEUTS SEG # BLD: 13.2 K/UL (ref 1.7–8.2)
NEUTS SEG NFR BLD: 93 % (ref 43–78)
NRBC # BLD: 0 K/UL (ref 0–0.2)
O2/TOTAL GAS SETTING VFR VENT: 21 %
P-R INTERVAL, ECG05: 152 MS
PCO2 BLD: 44 MMHG (ref 35–45)
PH BLD: 7.39 [PH] (ref 7.35–7.45)
PLATELET # BLD AUTO: 307 K/UL (ref 150–450)
PMV BLD AUTO: 11.2 FL (ref 9.4–12.3)
PO2 BLD: 48 MMHG (ref 75–100)
POTASSIUM SERPL-SCNC: 4.1 MMOL/L (ref 3.5–5.1)
PROT SERPL-MCNC: 7.7 G/DL (ref 6.3–8.2)
Q-T INTERVAL, ECG07: 344 MS
QRS DURATION, ECG06: 92 MS
QTC CALCULATION (BEZET), ECG08: 409 MS
RBC # BLD AUTO: 4.34 M/UL (ref 4.23–5.6)
SAO2 % BLD: 83 % (ref 95–98)
SERVICE CMNT-IMP: ABNORMAL
SERVICE CMNT-IMP: ABNORMAL
SODIUM SERPL-SCNC: 135 MMOL/L (ref 136–145)
SPECIMEN TYPE: ABNORMAL
TROPONIN I SERPL-MCNC: <0.02 NG/ML (ref 0.02–0.05)
TROPONIN I SERPL-MCNC: <0.02 NG/ML (ref 0.02–0.05)
VENTRICULAR RATE, ECG03: 85 BPM
WBC # BLD AUTO: 14.2 K/UL (ref 4.3–11.1)

## 2019-10-02 PROCEDURE — 83605 ASSAY OF LACTIC ACID: CPT

## 2019-10-02 PROCEDURE — 94640 AIRWAY INHALATION TREATMENT: CPT

## 2019-10-02 PROCEDURE — 96365 THER/PROPH/DIAG IV INF INIT: CPT | Performed by: EMERGENCY MEDICINE

## 2019-10-02 PROCEDURE — 36415 COLL VENOUS BLD VENIPUNCTURE: CPT

## 2019-10-02 PROCEDURE — 74011250636 HC RX REV CODE- 250/636: Performed by: EMERGENCY MEDICINE

## 2019-10-02 PROCEDURE — 99285 EMERGENCY DEPT VISIT HI MDM: CPT | Performed by: EMERGENCY MEDICINE

## 2019-10-02 PROCEDURE — 74011250636 HC RX REV CODE- 250/636: Performed by: HOSPITALIST

## 2019-10-02 PROCEDURE — 87040 BLOOD CULTURE FOR BACTERIA: CPT

## 2019-10-02 PROCEDURE — 36600 WITHDRAWAL OF ARTERIAL BLOOD: CPT

## 2019-10-02 PROCEDURE — 74011000250 HC RX REV CODE- 250: Performed by: HOSPITALIST

## 2019-10-02 PROCEDURE — 65270000029 HC RM PRIVATE

## 2019-10-02 PROCEDURE — 82803 BLOOD GASES ANY COMBINATION: CPT

## 2019-10-02 PROCEDURE — 93005 ELECTROCARDIOGRAM TRACING: CPT | Performed by: EMERGENCY MEDICINE

## 2019-10-02 PROCEDURE — 99218 HC RM OBSERVATION: CPT

## 2019-10-02 PROCEDURE — 96375 TX/PRO/DX INJ NEW DRUG ADDON: CPT | Performed by: EMERGENCY MEDICINE

## 2019-10-02 PROCEDURE — 74011250637 HC RX REV CODE- 250/637: Performed by: HOSPITALIST

## 2019-10-02 PROCEDURE — 80053 COMPREHEN METABOLIC PANEL: CPT

## 2019-10-02 PROCEDURE — 77030020255 HC SOL INJ LR 1000ML BG

## 2019-10-02 PROCEDURE — 74011000258 HC RX REV CODE- 258: Performed by: EMERGENCY MEDICINE

## 2019-10-02 PROCEDURE — 85025 COMPLETE CBC W/AUTO DIFF WBC: CPT

## 2019-10-02 PROCEDURE — 84484 ASSAY OF TROPONIN QUANT: CPT

## 2019-10-02 PROCEDURE — 71045 X-RAY EXAM CHEST 1 VIEW: CPT

## 2019-10-02 RX ORDER — PREDNISONE 10 MG/1
40 TABLET ORAL
Status: DISCONTINUED | OUTPATIENT
Start: 2019-10-03 | End: 2019-10-05 | Stop reason: HOSPADM

## 2019-10-02 RX ORDER — LEVOTHYROXINE AND LIOTHYRONINE 38; 9 UG/1; UG/1
15 TABLET ORAL DAILY
Status: DISCONTINUED | OUTPATIENT
Start: 2019-10-03 | End: 2019-10-05 | Stop reason: HOSPADM

## 2019-10-02 RX ORDER — BISACODYL 5 MG
5 TABLET, DELAYED RELEASE (ENTERIC COATED) ORAL DAILY PRN
Status: DISCONTINUED | OUTPATIENT
Start: 2019-10-02 | End: 2019-10-05 | Stop reason: HOSPADM

## 2019-10-02 RX ORDER — ENOXAPARIN SODIUM 100 MG/ML
40 INJECTION SUBCUTANEOUS EVERY 24 HOURS
Status: DISCONTINUED | OUTPATIENT
Start: 2019-10-02 | End: 2019-10-05 | Stop reason: HOSPADM

## 2019-10-02 RX ORDER — METRONIDAZOLE 500 MG/100ML
500 INJECTION, SOLUTION INTRAVENOUS EVERY 8 HOURS
Status: DISCONTINUED | OUTPATIENT
Start: 2019-10-02 | End: 2019-10-05

## 2019-10-02 RX ORDER — ACETAMINOPHEN 325 MG/1
650 TABLET ORAL
Status: DISCONTINUED | OUTPATIENT
Start: 2019-10-02 | End: 2019-10-05 | Stop reason: HOSPADM

## 2019-10-02 RX ORDER — HYDROCODONE BITARTRATE AND ACETAMINOPHEN 5; 325 MG/1; MG/1
1 TABLET ORAL
Status: DISCONTINUED | OUTPATIENT
Start: 2019-10-02 | End: 2019-10-05 | Stop reason: HOSPADM

## 2019-10-02 RX ORDER — SODIUM CHLORIDE 0.9 % (FLUSH) 0.9 %
5-40 SYRINGE (ML) INJECTION AS NEEDED
Status: DISCONTINUED | OUTPATIENT
Start: 2019-10-02 | End: 2019-10-05 | Stop reason: HOSPADM

## 2019-10-02 RX ORDER — IPRATROPIUM BROMIDE AND ALBUTEROL SULFATE 2.5; .5 MG/3ML; MG/3ML
3 SOLUTION RESPIRATORY (INHALATION)
Status: DISCONTINUED | OUTPATIENT
Start: 2019-10-02 | End: 2019-10-05 | Stop reason: HOSPADM

## 2019-10-02 RX ORDER — SODIUM CHLORIDE 9 MG/ML
125 INJECTION, SOLUTION INTRAVENOUS CONTINUOUS
Status: DISPENSED | OUTPATIENT
Start: 2019-10-02 | End: 2019-10-03

## 2019-10-02 RX ORDER — SODIUM CHLORIDE 0.9 % (FLUSH) 0.9 %
5-40 SYRINGE (ML) INJECTION EVERY 8 HOURS
Status: DISCONTINUED | OUTPATIENT
Start: 2019-10-02 | End: 2019-10-05 | Stop reason: HOSPADM

## 2019-10-02 RX ORDER — NALOXONE HYDROCHLORIDE 0.4 MG/ML
0.4 INJECTION, SOLUTION INTRAMUSCULAR; INTRAVENOUS; SUBCUTANEOUS AS NEEDED
Status: DISCONTINUED | OUTPATIENT
Start: 2019-10-02 | End: 2019-10-05 | Stop reason: HOSPADM

## 2019-10-02 RX ORDER — MORPHINE SULFATE 2 MG/ML
2 INJECTION, SOLUTION INTRAMUSCULAR; INTRAVENOUS
Status: DISCONTINUED | OUTPATIENT
Start: 2019-10-02 | End: 2019-10-03 | Stop reason: SDUPTHER

## 2019-10-02 RX ORDER — MORPHINE SULFATE 4 MG/ML
4 INJECTION INTRAVENOUS
Status: COMPLETED | OUTPATIENT
Start: 2019-10-02 | End: 2019-10-02

## 2019-10-02 RX ADMIN — MORPHINE SULFATE 4 MG: 4 INJECTION INTRAVENOUS at 17:44

## 2019-10-02 RX ADMIN — SODIUM CHLORIDE 1000 ML: 900 INJECTION, SOLUTION INTRAVENOUS at 17:44

## 2019-10-02 RX ADMIN — Medication 5 ML: at 22:43

## 2019-10-02 RX ADMIN — IPRATROPIUM BROMIDE AND ALBUTEROL SULFATE 3 ML: .5; 3 SOLUTION RESPIRATORY (INHALATION) at 20:32

## 2019-10-02 RX ADMIN — METRONIDAZOLE 500 MG: 500 INJECTION, SOLUTION INTRAVENOUS at 22:42

## 2019-10-02 RX ADMIN — HYDROCODONE BITARTRATE AND ACETAMINOPHEN 1 TABLET: 5; 325 TABLET ORAL at 22:36

## 2019-10-02 RX ADMIN — CEFEPIME HYDROCHLORIDE 1 G: 1 INJECTION, POWDER, FOR SOLUTION INTRAMUSCULAR; INTRAVENOUS at 17:44

## 2019-10-02 RX ADMIN — SODIUM CHLORIDE 125 ML/HR: 900 INJECTION, SOLUTION INTRAVENOUS at 22:42

## 2019-10-02 NOTE — ED PROVIDER NOTES
Patient is a 61 yo male with recurrent pneumonia from known aspiration pneumonia who presents with SOB and chest pain. States cough had actually improved however last night with fever to 104, mild cough and pain in his chest.  States no nausea or vomiting, no abdominal pain, states seen by Dr. Ludivina Barron yesterday from pulmonary and instructed to come to ED for admission today. Past Medical History:  
Diagnosis Date  Chronic pain   
 arms  Hypothyroidism   
 controlled with medication  PEG (percutaneous endoscopic gastrostomy) adjustment/replacement/removal ([facility] [address]  Pneumonia  Throat cancer ([facility] [address]  Past Surgical History:  
Procedure Laterality Date [address] UN[address]ED   PEG tube insertion  COLONOSCOPY N/A 2019 COLONOSCOPY BMI 19 performed by Chepe Garcia MD at [place] [address]  HX COLONOSCOPY Family History:  
Problem Relation Age of Onset  Diabetes Mother Social History Socioeconomic History  Marital status:  Spouse name: Not on file  Number of children: Not on file  Years of education: Not on file  Highest education level: Not on file Occupational History  Not on file Social Needs  Financial resource strain: Not on file  Food insecurity:  
  Worry: Not on file Inability: Not on file  Transportation needs:  
  Medical: Not on file Non-medical: Not on file Tobacco Use  Smoking status: Former Smoker Packs/day: 1.00 Years: 20.00 Pack years: 20.00 Types: Cigarettes Last attempt to quit: 2005 Years since quittin.7  Smokeless tobacco: Never Used Substance and Sexual Activity  Alcohol use: No  
  Frequency: Never  Drug use: No  
 Sexual activity: Not on file Lifestyle  Physical activity:  
  Days per week: Not on file Minutes per session: Not on file  Stress: Not on file Relationships  Social connections:  
  Talks on phone: Not on file Gets together: Not on file Attends Yarsani service: Not on file Active member of club or organization: Not on file Attends meetings of clubs or organizations: Not on file Relationship status: Not on file  Intimate partner violence:  
  Fear of current or ex partner: Not on file Emotionally abused: Not on file Physically abused: Not on file Forced sexual activity: Not on file Other Topics Concern  Not on file Social History Narrative  Not on file ALLERGIES: Demerol [meperidine] Review of Systems Constitutional: Negative for chills and fever. HENT: Negative for rhinorrhea and sore throat. Eyes: Negative for visual disturbance. Respiratory: Positive for cough and shortness of breath. Cardiovascular: Positive for chest pain. Negative for leg swelling. Gastrointestinal: Negative for abdominal pain, diarrhea, nausea and vomiting. Genitourinary: Negative for dysuria. Musculoskeletal: Negative for back pain and neck pain. Skin: Negative for rash. Neurological: Negative for weakness and headaches. Psychiatric/Behavioral: The patient is not nervous/anxious. Vitals:  
 10/02/19 1705 BP: (!) 140/96 Pulse: 86 Resp: 19 Temp: 97.8 °F (36.6 °C) SpO2: 93% Weight: 54.4 kg (120 lb) Height: 5' 11\" (1.803 m) Physical Exam  
Constitutional: He is oriented to person, place, and time. He appears well-developed and well-nourished. HENT:  
Head: Normocephalic. Right Ear: External ear normal.  
Left Ear: External ear normal.  
Eyes: Pupils are equal, round, and reactive to light. Conjunctivae and EOM are normal.  
Neck: Normal range of motion. Neck supple. No tracheal deviation present. Cardiovascular: Normal rate, regular rhythm, normal heart sounds and intact distal pulses. No murmur heard. Pulmonary/Chest: No stridor. He has wheezes. He has no rales. Slight increased effort, diffuse mild wheezes Abdominal: Soft. There is no tenderness. Musculoskeletal: Normal range of motion. Neurological: He is alert and oriented to person, place, and time. No cranial nerve deficit. Skin: No rash noted. Nursing note and vitals reviewed. MDM Number of Diagnoses or Management Options Amount and/or Complexity of Data Reviewed Clinical lab tests: ordered and reviewed Tests in the radiology section of CPT®: reviewed and ordered Tests in the medicine section of CPT®: ordered and reviewed Review and summarize past medical records: yes Risk of Complications, Morbidity, and/or Mortality Presenting problems: high Diagnostic procedures: high Management options: high Patient Progress Patient progress: stable Procedures Recent Results (from the past 12 hour(s)) EKG, 12 LEAD, INITIAL Collection Time: 10/02/19  5:09 PM  
Result Value Ref Range Ventricular Rate 85 BPM  
 Atrial Rate 85 BPM  
 P-R Interval 152 ms QRS Duration 92 ms Q-T Interval 344 ms QTC Calculation (Bezet) 409 ms Calculated P Axis 75 degrees Calculated R Axis 73 degrees Calculated T Axis 82 degrees Diagnosis    
  !! AGE AND GENDER SPECIFIC ECG ANALYSIS !! Normal sinus rhythm RSR' or QR pattern in V1 suggests right ventricular conduction delay Borderline ECG No previous ECGs available Confirmed by GIFTY VELASQUEZ (), Monique Mccall ([id number]) on 10/2/2019 5:33:08 PM 
  
CBC WITH AUTOMATED DIFF Collection Time: 10/02/19  5:31 PM  
Result Value Ref Range WBC 14.2 (H) 4.3 - 11.1 K/uL  
 RBC 4.34 4.23 - 5.6 M/uL  
 HGB 12.5 (L) 13.6 - 17.2 g/dL HCT 40.1 (L) 41.1 - 50.3 % MCV 92.4 79.6 - 97.8 FL  
 MCH 28.8 26.1 - 32.9 PG  
 MCHC 31.2 (L) 31.4 - 35.0 g/dL  
 RDW 17.2 (H) 11.9 - 14.6 % PLATELET 396 401 - 660 K/uL MPV 11.2 9.4 - 12.3 FL ABSOLUTE NRBC 0.00 0.0 - 0.2 K/uL  
 DF AUTOMATED NEUTROPHILS 93 (H) 43 - 78 % LYMPHOCYTES 5 (L) 13 - 44 % MONOCYTES 2 (L) 4.0 - 12.0 % EOSINOPHILS 0 (L) 0.5 - 7.8 % BASOPHILS 0 0.0 - 2.0 % IMMATURE GRANULOCYTES 0 0.0 - 5.0 %  
 ABS. NEUTROPHILS 13.2 (H) 1.7 - 8.2 K/UL  
 ABS. LYMPHOCYTES 0.7 0.5 - 4.6 K/UL  
 ABS. MONOCYTES 0.3 0.1 - 1.3 K/UL  
 ABS. EOSINOPHILS 0.0 0.0 - 0.8 K/UL  
 ABS. BASOPHILS 0.0 0.0 - 0.2 K/UL  
 ABS. IMM. GRANS. 0.1 0.0 - 0.5 K/UL METABOLIC PANEL, COMPREHENSIVE Collection Time: 10/02/19  5:31 PM  
Result Value Ref Range Sodium 135 (L) 136 - 145 mmol/L Potassium 4.1 3.5 - 5.1 mmol/L Chloride 99 98 - 107 mmol/L  
 CO2 30 21 - 32 mmol/L Anion gap 6 (L) 7 - 16 mmol/L Glucose 125 (H) 65 - 100 mg/dL BUN 11 8 - 23 MG/DL Creatinine 0.65 (L) 0.8 - 1.5 MG/DL  
 GFR est AA >60 >60 ml/min/1.73m2 GFR est non-AA >60 >60 ml/min/1.73m2 Calcium 9.1 8.3 - 10.4 MG/DL Bilirubin, total 0.5 0.2 - 1.1 MG/DL  
 ALT (SGPT) 11 (L) 12 - 65 U/L  
 AST (SGOT) 8 (L) 15 - 37 U/L Alk. phosphatase 69 50 - 136 U/L Protein, total 7.7 6.3 - 8.2 g/dL Albumin 3.1 (L) 3.2 - 4.6 g/dL Globulin 4.6 (H) 2.3 - 3.5 g/dL A-G Ratio 0.7 (L) 1.2 - 3.5    
TROPONIN I Collection Time: 10/02/19  5:31 PM  
Result Value Ref Range Troponin-I, Qt. <0.02 (L) 0.02 - 0.05 NG/ML  
POC LACTIC ACID Collection Time: 10/02/19  5:41 PM  
Result Value Ref Range Lactic Acid (POC) 1.12 0.5 - 1.9 mmol/L Ct Chest Wo Cont Result Date: 9/3/2019 CT chest without contrast HISTORY: Chronic aspiration with pneumonia. TECHNIQUE: Helically acquired images were obtained through the chest reconstructed at 5 mm thickness. High-resolution imaging was performed in the supine and prone positions. Coronal reformatted images were submitted.  Radiation dose reduction techniques were used for this study:  Our CT scanners use one or all of the following: Automated exposure control, adjustment of the mA and/or kVp according to patient's size, iterative reconstruction. . COMPARISON: 07/06/2019 from an outside facility. FINDINGS: There is no pleural or pericardial effusion. There are extensive tree-in-bud type opacities throughout the lungs with overall mild progression. These predominate within the lower lung zones. The more confluent opacities within the lower lobes previously have improved. There are numerous parenchymal calcifications within the bilateral lower lobes and right middle lobe. There is debris within the right interlobar bronchus and bilateral lower lobe bronchi most suspicious for aspiration. There is mild diffuse bronchiectasis. There is no honeycombing. No significant emphysematous changes are present. There are several scattered small mediastinal lymph nodes appearing stable or slightly less pronounced. IMPRESSION: 1. Extensive tree-in-bud type opacities bilaterally with overall mild progression. These findings most commonly or secondary to bacterial infections other etiologies including mycobacterial infections and hypersensitivity pneumonitis could also have this appearance. 2. Numerous coarse parenchymal calcifications within the lower lung zones suggests prior granulomatous disease. Alveolar microlithiasis is thought to be less likely. 3. Debris-filled bronchi will be most compatible with aspiration. Confluent airspace opacities within the lower lobes have resolved. Xr Chest [facility] Result Date: 10/2/2019 Portable chest x-ray Clinical location: [facility] FINDINGS: Single AP view of the chest submitted without comparison shows diffuse reticular nodular interstitial prominence throughout the lung bases with dense mottled radiopaque structures that may represent retained barium. The cardiac silhouette and mediastinum are unremarkable. No pleural effusion or pneumothorax. The bones are osteopenic. IMPRESSION: Chronic interstitial changes more evident at the lung bases.  There are indeterminate very dense reticular nodular structures at the lung bases that may represent retained barium from a prior aspiration. No definite acute abnormality. 61 yo male with pneumonia: 
 
 
 
 Patient with fever at home and increased O2 requirement in ED today. No obvious new pneumonia however likely recurrent aspiration given his history. Sent by pulmonology for admission so will begin abx, increase O2 to 4L and patient is at 92-95% and will admit for further management.

## 2019-10-02 NOTE — ED TRIAGE NOTES
Patient reports being diagnosed with pneumonia. Admitted and discharged. Last evening patient spiked fever 104. 1. Currently taking Augmentin. Complains of throbbing chest pain

## 2019-10-03 ENCOUNTER — APPOINTMENT (OUTPATIENT)
Dept: CT IMAGING | Age: 59
DRG: 177 | End: 2019-10-03
Attending: INTERNAL MEDICINE
Payer: COMMERCIAL

## 2019-10-03 PROBLEM — E03.9 ACQUIRED HYPOTHYROIDISM: Status: ACTIVE | Noted: 2019-10-03

## 2019-10-03 PROBLEM — E46 PROTEIN CALORIE MALNUTRITION (HCC): Status: ACTIVE | Noted: 2019-10-03

## 2019-10-03 PROBLEM — Z85.21 H/O LARYNGEAL CANCER: Chronic | Status: ACTIVE | Noted: 2019-10-03

## 2019-10-03 PROBLEM — J32.9 SINUSITIS: Status: ACTIVE | Noted: 2019-10-03

## 2019-10-03 PROBLEM — Z85.21 H/O LARYNGEAL CANCER: Status: ACTIVE | Noted: 2019-10-03

## 2019-10-03 PROBLEM — R06.89 INEFFECTIVE AIRWAY CLEARANCE: Status: RESOLVED | Noted: 2019-09-04 | Resolved: 2019-10-03

## 2019-10-03 PROBLEM — R63.30 FEEDING DIFFICULTIES AND MISMANAGEMENT: Chronic | Status: ACTIVE | Noted: 2018-11-14

## 2019-10-03 PROBLEM — T17.908A ASPIRATION, CHRONIC PULMONARY: Chronic | Status: ACTIVE | Noted: 2019-09-04

## 2019-10-03 PROBLEM — R91.8 PULMONARY INFILTRATES: Status: ACTIVE | Noted: 2019-10-03

## 2019-10-03 PROBLEM — E03.9 ACQUIRED HYPOTHYROIDISM: Chronic | Status: ACTIVE | Noted: 2019-10-03

## 2019-10-03 PROBLEM — R13.10 DYSPHAGIA: Status: ACTIVE | Noted: 2019-10-03

## 2019-10-03 PROBLEM — R13.10 DYSPHAGIA: Chronic | Status: ACTIVE | Noted: 2019-10-03

## 2019-10-03 LAB
ANION GAP SERPL CALC-SCNC: 8 MMOL/L (ref 7–16)
BASOPHILS # BLD: 0 K/UL (ref 0–0.2)
BASOPHILS NFR BLD: 0 % (ref 0–2)
BUN SERPL-MCNC: 13 MG/DL (ref 8–23)
CALCIUM SERPL-MCNC: 8.7 MG/DL (ref 8.3–10.4)
CHLORIDE SERPL-SCNC: 104 MMOL/L (ref 98–107)
CO2 SERPL-SCNC: 26 MMOL/L (ref 21–32)
CREAT SERPL-MCNC: 0.54 MG/DL (ref 0.8–1.5)
DIFFERENTIAL METHOD BLD: ABNORMAL
EOSINOPHIL # BLD: 0.1 K/UL (ref 0–0.8)
EOSINOPHIL NFR BLD: 1 % (ref 0.5–7.8)
ERYTHROCYTE [DISTWIDTH] IN BLOOD BY AUTOMATED COUNT: 17 % (ref 11.9–14.6)
GLUCOSE SERPL-MCNC: 91 MG/DL (ref 65–100)
HCT VFR BLD AUTO: 34.8 % (ref 41.1–50.3)
HGB BLD-MCNC: 11 G/DL (ref 13.6–17.2)
IMM GRANULOCYTES # BLD AUTO: 0 K/UL (ref 0–0.5)
IMM GRANULOCYTES NFR BLD AUTO: 0 % (ref 0–5)
LYMPHOCYTES # BLD: 1.5 K/UL (ref 0.5–4.6)
LYMPHOCYTES NFR BLD: 16 % (ref 13–44)
MCH RBC QN AUTO: 29.1 PG (ref 26.1–32.9)
MCHC RBC AUTO-ENTMCNC: 31.6 G/DL (ref 31.4–35)
MCV RBC AUTO: 92.1 FL (ref 79.6–97.8)
MONOCYTES # BLD: 0.7 K/UL (ref 0.1–1.3)
MONOCYTES NFR BLD: 8 % (ref 4–12)
NEUTS SEG # BLD: 7.1 K/UL (ref 1.7–8.2)
NEUTS SEG NFR BLD: 76 % (ref 43–78)
NRBC # BLD: 0 K/UL (ref 0–0.2)
PLATELET # BLD AUTO: 267 K/UL (ref 150–450)
PMV BLD AUTO: 11 FL (ref 9.4–12.3)
POTASSIUM SERPL-SCNC: 3.5 MMOL/L (ref 3.5–5.1)
RBC # BLD AUTO: 3.78 M/UL (ref 4.23–5.6)
SODIUM SERPL-SCNC: 138 MMOL/L (ref 136–145)
TROPONIN I SERPL-MCNC: <0.02 NG/ML (ref 0.02–0.05)
WBC # BLD AUTO: 9.5 K/UL (ref 4.3–11.1)

## 2019-10-03 PROCEDURE — 94640 AIRWAY INHALATION TREATMENT: CPT

## 2019-10-03 PROCEDURE — 87070 CULTURE OTHR SPECIMN AEROBIC: CPT

## 2019-10-03 PROCEDURE — 80048 BASIC METABOLIC PNL TOTAL CA: CPT

## 2019-10-03 PROCEDURE — 74011000258 HC RX REV CODE- 258: Performed by: HOSPITALIST

## 2019-10-03 PROCEDURE — 87186 SC STD MICRODIL/AGAR DIL: CPT

## 2019-10-03 PROCEDURE — 74011000250 HC RX REV CODE- 250: Performed by: HOSPITALIST

## 2019-10-03 PROCEDURE — 36415 COLL VENOUS BLD VENIPUNCTURE: CPT

## 2019-10-03 PROCEDURE — 99223 1ST HOSP IP/OBS HIGH 75: CPT | Performed by: INTERNAL MEDICINE

## 2019-10-03 PROCEDURE — 87077 CULTURE AEROBIC IDENTIFY: CPT

## 2019-10-03 PROCEDURE — 77030020263 HC SOL INJ SOD CL0.9% LFCR 1000ML

## 2019-10-03 PROCEDURE — 82784 ASSAY IGA/IGD/IGG/IGM EACH: CPT

## 2019-10-03 PROCEDURE — 74011636637 HC RX REV CODE- 636/637: Performed by: HOSPITALIST

## 2019-10-03 PROCEDURE — 85025 COMPLETE CBC W/AUTO DIFF WBC: CPT

## 2019-10-03 PROCEDURE — 74011250636 HC RX REV CODE- 250/636: Performed by: INTERNAL MEDICINE

## 2019-10-03 PROCEDURE — 71250 CT THORAX DX C-: CPT

## 2019-10-03 PROCEDURE — 74011250636 HC RX REV CODE- 250/636: Performed by: HOSPITALIST

## 2019-10-03 PROCEDURE — 77010033678 HC OXYGEN DAILY

## 2019-10-03 PROCEDURE — 94760 N-INVAS EAR/PLS OXIMETRY 1: CPT

## 2019-10-03 PROCEDURE — 74011250637 HC RX REV CODE- 250/637: Performed by: HOSPITALIST

## 2019-10-03 PROCEDURE — 84484 ASSAY OF TROPONIN QUANT: CPT

## 2019-10-03 PROCEDURE — 65270000029 HC RM PRIVATE

## 2019-10-03 RX ORDER — MORPHINE SULFATE 2 MG/ML
2 INJECTION, SOLUTION INTRAMUSCULAR; INTRAVENOUS
Status: DISCONTINUED | OUTPATIENT
Start: 2019-10-03 | End: 2019-10-05 | Stop reason: HOSPADM

## 2019-10-03 RX ADMIN — CEFEPIME HYDROCHLORIDE 1 G: 1 INJECTION, POWDER, FOR SOLUTION INTRAMUSCULAR; INTRAVENOUS at 09:33

## 2019-10-03 RX ADMIN — METRONIDAZOLE 500 MG: 500 INJECTION, SOLUTION INTRAVENOUS at 05:59

## 2019-10-03 RX ADMIN — SODIUM CHLORIDE 125 ML/HR: 900 INJECTION, SOLUTION INTRAVENOUS at 09:32

## 2019-10-03 RX ADMIN — CEFEPIME HYDROCHLORIDE 1 G: 1 INJECTION, POWDER, FOR SOLUTION INTRAMUSCULAR; INTRAVENOUS at 01:23

## 2019-10-03 RX ADMIN — PREDNISONE 40 MG: 10 TABLET ORAL at 11:26

## 2019-10-03 RX ADMIN — MORPHINE SULFATE 2 MG: 2 INJECTION, SOLUTION INTRAMUSCULAR; INTRAVENOUS at 05:59

## 2019-10-03 RX ADMIN — IPRATROPIUM BROMIDE AND ALBUTEROL SULFATE 3 ML: .5; 3 SOLUTION RESPIRATORY (INHALATION) at 14:01

## 2019-10-03 RX ADMIN — IPRATROPIUM BROMIDE AND ALBUTEROL SULFATE 3 ML: .5; 3 SOLUTION RESPIRATORY (INHALATION) at 19:49

## 2019-10-03 RX ADMIN — METRONIDAZOLE 500 MG: 500 INJECTION, SOLUTION INTRAVENOUS at 22:26

## 2019-10-03 RX ADMIN — LEVOTHYROXINE, LIOTHYRONINE 15 MG: 38; 9 TABLET ORAL at 11:27

## 2019-10-03 RX ADMIN — IPRATROPIUM BROMIDE AND ALBUTEROL SULFATE 3 ML: .5; 3 SOLUTION RESPIRATORY (INHALATION) at 02:23

## 2019-10-03 RX ADMIN — MORPHINE SULFATE 2 MG: 2 INJECTION, SOLUTION INTRAMUSCULAR; INTRAVENOUS at 10:03

## 2019-10-03 RX ADMIN — IPRATROPIUM BROMIDE AND ALBUTEROL SULFATE 3 ML: .5; 3 SOLUTION RESPIRATORY (INHALATION) at 08:30

## 2019-10-03 RX ADMIN — METRONIDAZOLE 500 MG: 500 INJECTION, SOLUTION INTRAVENOUS at 14:12

## 2019-10-03 RX ADMIN — CEFEPIME HYDROCHLORIDE 1 G: 1 INJECTION, POWDER, FOR SOLUTION INTRAMUSCULAR; INTRAVENOUS at 16:30

## 2019-10-03 RX ADMIN — MORPHINE SULFATE 2 MG: 2 INJECTION, SOLUTION INTRAMUSCULAR; INTRAVENOUS at 18:16

## 2019-10-03 RX ADMIN — MORPHINE SULFATE 2 MG: 2 INJECTION, SOLUTION INTRAMUSCULAR; INTRAVENOUS at 14:11

## 2019-10-03 RX ADMIN — Medication 10 ML: at 06:00

## 2019-10-03 RX ADMIN — MORPHINE SULFATE 2 MG: 2 INJECTION, SOLUTION INTRAMUSCULAR; INTRAVENOUS at 22:28

## 2019-10-03 NOTE — CONSULTS
CONSULT NOTE Livia Judd 
 
10/3/2019 Date of Admission:  10/2/2019 The patient's chart is reviewed and the patient is discussed with the staff. Subjective:  
 
   Patient is a 60 y.o.  male seen and evaluated at the request of Dr. Ole Hartley. He is a 60y.o. 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 He underwent a CT of the chest on 7/6 which revealed diffuse lower lobe predominant nodular opacities with plugging and nonspecific mediastinal and hilar lymphadenopathy. No pulmonary embolism was noted. He had a leukocytosis measured at 18.8k with a neutrophilic predominance. Admission wasn't recommended and he was discharged home.    
  He was then seen on August 12 by Dr. Elizabeth Dawkins, ENT, after completing antibiotics with Augmentin and Cipro. At that appointment he was started on Septra, Protonix and nystatin with Magic mouthwash for oral candidiasis and sinusitis. He was noted to have right neck discomfort and a CT of the neck was recommended if his symptoms of right neck pain continued.  He was initially seen at [facility] on August 29, when his oxygen saturation was noted to be 83% on room air. He reported he had just experienced his first night without a fever since July. He reported significant right sinus pain and congestion as well as a persistent cough. Hospitalization was recommended, but the patient declined in favor of oral antibiotics and oxygen. Levaquin and clindamycin were initiated and oxygen was ordered. The oxygen was not delivered over the holiday weekend and the patient has had a very difficult course.   On Monday 9/2, Levaquin was changed to [facility] after sputum cultures revealed E. Coli. Ultimately he was hospitalized from 9/4 until 9/10/2019 and underwent bronchoscopy which revealed E. coli as well as Prevotella dentalis. He completed 14 days augmentin and 7 days of cefepime with improvement in hypoxemia and fevers. He was seen back in the office on 10/1 by Dr. Ravi Mendoza and he remained hypoxic so oxygen support was continued. He continued to have right maxillary pressure with green colored drainage. He was given magic mouthwash and follow up with ENT recommended. He saw Dr. Godfrey Ohara, ENT, on 9/27 and endoscopy was performed revealing rhinosinusitis. Per his note, he planned to do a culture and CT the sinuses. [facility]. He has now been admitted with a fever of 104. His WBC of 14.2 -> 9.5. His CXR shows bilateral infiltrates. He is coughing up large amounts of thin, green colored material that his wife states is draining from his sinuses. He remains NPO and is not even taking pleasure liquids anymore. He is PEG feeding dependent. Review of Systems A comprehensive review of systems was negative except for: Constitutional: positive for fevers, fatigue, malaise and weight loss Eyes: positive for none Ears, nose, mouth, throat, and face: positive for sinus drainage Respiratory: positive for cough Cardiovascular: positive for none Gastrointestinal: positive for dysphagia Genitourinary: positive for none Integument/breast: positive for none Hematologic/lymphatic: positive for none Musculoskeletal: positive for generalized aches with weakness Neurological: positive for none Behvioral/Psych: positive for none Endocrine: positive for none Allergic/Immunologic: positive for none Patient Active Problem List  
Diagnosis Code  Feeding difficulties and mismanagement R63.3  Acute and chronic respiratory failure with hypoxia ([facility]) J96.21  
  Pneumonia of both lungs due to Escherichia coli ([address] J15.5  Acute maxillary sinusitis J01.00  
 Ineffective airway clearance R06.89  
 Aspiration, chronic pulmonary T17.908A  Aspiration pneumonia ([address] J69.0  Acquired hypothyroidism E03.9  Dysphagia R13.10  
 H/O laryngeal cancer Z85.21 Prior to Admission Medications Prescriptions Last Dose Informant Patient Reported? Taking?  
amoxicillin-clavulanate (AUGMENTIN) 875-125 mg per tablet   No No  
Sig: Take 1 Tab by mouth two (2) times a day. magic mouthwash solution   No No  
Sig: Magic Mouthwash wo Xylocaine Hydrocortisone powder 15 mg Nystatin 15 cc Benadryl Elixir 30 cc Tetracycline 500mg QS to 120 cc with water 1 teaspoon swish and spit out after meals and at bedtime  
nystatin (MYCOSTATIN) 100,000 unit/mL suspension   No No  
Sig: Take 5 mL by mouth four (4) times daily. swish and spit  
thyroid, Pork, (ARMOUR THYROID) 15 mg tablet   No No  
Si q daily Facility-Administered Medications: None Past Medical History:  
Diagnosis Date  Chronic pain   
 arms  Hypothyroidism   
 controlled with medication  PEG (percutaneous endoscopic gastrostomy) adjustment/replacement/removal ([address]  Pneumonia  Throat cancer ([facility]  Active Ambulatory Problems Diagnosis Date Noted  Feeding difficulties and mismanagement 2018  Acute and chronic respiratory failure with hypoxia ([address] 2019  Pneumonia of both lungs due to Escherichia coli ([address] 2019  Acute maxillary sinusitis 2019  Ineffective airway clearance 2019  Aspiration, chronic pulmonary 2019 Resolved Ambulatory Problems Diagnosis Date Noted  No Resolved Ambulatory Problems Past Medical History:  
Diagnosis Date  Chronic pain  Hypothyroidism  PEG (percutaneous endoscopic gastrostomy) adjustment/replacement/removal ([address]  Pneumonia  Throat cancer ([address]  Past Surgical History:  
Procedure Laterality Date [address] UNLISTED   PEG tube insertion  COLONOSCOPY N/A 2019 COLONOSCOPY BMI 19 performed by Cadence Short MD at [facility]  HX COLONOSCOPY Social History Socioeconomic History  Marital status:  Spouse name: Not on file  Number of children: Not on file  Years of education: Not on file  Highest education level: Not on file Occupational History  Not on file Social Needs  Financial resource strain: Not on file  Food insecurity:  
  Worry: Not on file Inability: Not on file  Transportation needs:  
  Medical: Not on file Non-medical: Not on file Tobacco Use  Smoking status: Former Smoker Packs/day: 1.00 Years: 20.00 Pack years: 20.00 Types: Cigarettes Last attempt to quit: 2005 Years since quittin.7  Smokeless tobacco: Never Used Substance and Sexual Activity  Alcohol use: No  
  Frequency: Never  Drug use: No  
 Sexual activity: Not on file Lifestyle  Physical activity:  
  Days per week: Not on file Minutes per session: Not on file  Stress: Not on file Relationships  Social connections:  
  Talks on phone: Not on file Gets together: Not on file Attends Tenriism service: Not on file Active member of club or organization: Not on file Attends meetings of clubs or organizations: Not on file Relationship status: Not on file  Intimate partner violence:  
  Fear of current or ex partner: Not on file Emotionally abused: Not on file Physically abused: Not on file Forced sexual activity: Not on file Other Topics Concern  Not on file Social History Narrative  Not on file Family History Problem Relation Age of Onset  Diabetes Mother Allergies Allergen Reactions  Demerol [Meperidine] Rash Current Facility-Administered Medications Medication Dose Route Frequency  NUTRITIONAL SUPPORT ELECTROLYTE PRN ORDERS   Does Not Apply PRN  
 sodium chloride (NS) flush 5-40 mL  5-40 mL IntraVENous Q8H  
 sodium chloride (NS) flush 5-40 mL  5-40 mL IntraVENous PRN  
 0.9% sodium chloride infusion  125 mL/hr IntraVENous CONTINUOUS  
 acetaminophen (TYLENOL) tablet 650 mg  650 mg Oral Q4H PRN  
 HYDROcodone-acetaminophen (NORCO) 5-325 mg per tablet 1 Tab  1 Tab Oral Q4H PRN  
 morphine injection 2 mg  2 mg IntraVENous Q4H PRN  
 naloxone (NARCAN) injection 0.4 mg  0.4 mg IntraVENous PRN  
 bisacodyl (DULCOLAX) tablet 5 mg  5 mg Oral DAILY PRN  
 enoxaparin (LOVENOX) injection 40 mg  40 mg SubCUTAneous Q24H  
 albuterol-ipratropium (DUO-NEB) 2.5 MG-0.5 MG/3 ML  3 mL Nebulization Q6H RT  
 albuterol-ipratropium (DUO-NEB) 2.5 MG-0.5 MG/3 ML  3 mL Nebulization Q4H PRN  
 cefepime (MAXIPIME) 1 g in 0.9% sodium chloride (MBP/ADV) 50 mL  1 g IntraVENous Q8H  
 metroNIDAZOLE (FLAGYL) IVPB premix 500 mg  500 mg IntraVENous Q8H  
 thyroid (Pork) (ARMOUR) tablet 15 mg  15 mg Oral DAILY  predniSONE (DELTASONE) tablet 40 mg  40 mg Oral DAILY WITH BREAKFAST Objective:  
 
Vitals:  
 10/03/19 0747 10/03/19 0832 10/03/19 1030 10/03/19 1143 BP: 120/69   92/54 Pulse: 92   92 Resp: 18   20 Temp: 98.6 °F (37 °C)   97.9 °F (36.6 °C) SpO2: 90% (!) 87% (!) 82% 91% Weight:      
Height: PHYSICAL EXAM  
 
Constitutional:  the patient is thin, well developed and in no acute distress HEENT:  Sclera clear, pupils equal, oral mucosa moist 
Lungs: clear bilaterally. Sputum in cup at bedside - green in color Cardiovascular:  RRR without M,G,R 
Abd/GI: soft and non-tender; with positive bowel sounds. Ext: warm without cyanosis. There is no lower leg edema. Skin:  no jaundice or rashes, no wounds Neuro: no gross neuro deficits. Alert and oriented Musculoskeletal: moves all four extremities. No deformities. Psychiatric: calm. Does not appear anxious or depressed Chest X-ray:   
 
 
 Chest CT sept 2019 -  
1. Extensive tree-in-bud type opacities bilaterally with overall mild 
progression. These findings most commonly or secondary to bacterial infections 
other etiologies including mycobacterial infections and hypersensitivity 
pneumonitis could also have this appearance. 2. Numerous coarse parenchymal calcifications within the lower lung zones 
suggests prior granulomatous disease. Alveolar microlithiasis is thought to be 
less likely. 3. Debris-filled bronchi will be most compatible with aspiration. Confluent 
airspace opacities within the lower lobes have resolved. Recent Labs 10/03/19 
0406 10/02/19 
1731 WBC 9.5 14.2* HGB 11.0* 12.5*  
HCT 34.8* 40.1*  307 Recent Labs 10/03/19 
0406 10/02/19 
1731  135* K 3.5 4.1  99 GLU 91 125* CO2 26 30 BUN 13 11 CREA 0.54* 0.65* CA 8.7 9.1 ALB  --  3.1*  
SGOT  --  8* Assessment:  (Medical Decision Making) Hospital Problems  Date Reviewed: 10/1/2019 Codes Class Noted POA Acquired hypothyroidism ICD-10-CM: E03.9 ICD-9-CM: 244.9  10/3/2019 Unknown Dysphagia ICD-10-CM: R13.10 ICD-9-CM: 787.20  10/3/2019 Unknown H/O laryngeal cancer ICD-10-CM: Z85.21 
ICD-9-CM: V10.21  10/3/2019 Unknown Aspiration pneumonia ([address] ICD-10-CM: J69.0 ICD-9-CM: 507.0  10/2/2019 Unknown * (Principal) Acute and chronic respiratory failure with hypoxia (HCC) ICD-10-CM: J96.21 
ICD-9-CM: 518.84, 799.02  9/4/2019 Yes Aspiration, chronic pulmonary ICD-10-CM: T17.908A ICD-9-CM: 934.9  9/4/2019 Yes Plan:  (Medical Decision Making) 1. Will ask [employer] \"push\" recent sinus CT over for review. 2. Abx as ordered - history of E Coli, candida per bronch washings last month. Has a large amount of green colored material draining from sinuses. Will send for culture and ask ENT to reevaluate. WBC 14.2 -> 9.5. 3. NPO with TF dependence. Noted weight loss per wife - will ask dietary to reassess caloric needs 4. ST seeing and wife interested in outpatient followup 5. Continue with oxygen support - ongoing hypoxia with abnormal CXR. Will repeat CT of the chest to reassess infiltrates noted last month. Gabriel Rehman, ALIA Lungs:  Bilateral crackles and rhonchi Heart:  RRR with no Murmur/Rubs/Gallops Additional Comments:  Culture sputum, chest ct, will ask ent to see here I have spoken with and examined the patient. I agree with the above assessment and plan as documented. Catherine Dela Cruz MD 
 
 
 
More than 50% of time documented was spent face-to-face contact with the patient and in the care of the patient on the floor/unit where the patient is located

## 2019-10-03 NOTE — PROGRESS NOTES
Pt received schedule medications and prn pain med call light within reach , bed on low position and locked, pt able to make needs known, no discomfort noted.

## 2019-10-03 NOTE — PROGRESS NOTES
Pt resting in bed. Pt complaints of chest pain 10/10 at this this time. Pt on 4 L NC At this time. Pt has no acute distress noted at this time. Pt has button PEG tube in place. Pt encouraged to call for assistance if needed call light in reach, door open will monitor.

## 2019-10-03 NOTE — INTERDISCIPLINARY ROUNDS
Interdisciplinary team rounds were held 10/3/2019 with the following team members:Care Management, Physical Therapy, Physician and Clinical Coordinator and the patient. Plan of care discussed. See clinical pathway and/or care plan for interventions and desired outcomes.

## 2019-10-03 NOTE — PROGRESS NOTES
SPEECH PATHOLOGY NOTE: 
 
Speech therapy consult received and appreciated. Patient with history of squamous cell carcinoma of hypopharynx status post chemo and radiation treatment in 2005. Per patient report, patient has been NPO with PEG since 2015 and last modified barium swallow study was completed in [place] summer of 2018 with continued NPO recommendation. Patient reports minimal intake of only thin liquids for pleasure. Educated patient on recommendation for repeat modified barium swallow study to objectively assess oropharyngeal swallow function and to determine if there are any compensatory strategies patietn could utilize during swallow to improve airway protection as patient has had recurrent aspiration pneumonia. Patient declined speech therapy services and modified barium swallow study at this time. He stated understanding aspiration risk with PO intake. Recommended patient consider outpatient speech therapy after discharge. Patient stated he would consider this option.   
 
 
 
Yuliya Pena MS, CCC-SLP

## 2019-10-03 NOTE — PROGRESS NOTES
Addendum: Spoke with patient's wife. She has been using two blenderized tube feedings per day plus Osmolite 1.5 between with the goal of providing 3000 kcal/day to promote weight gain. Patient states his highest body weight has been 145# and would like to regain to 140-145#. Discussed IBW calculation and agreed 175# would be an unrealistic weight for him. Collaboratively agreed to do 2 cartons of Osmolite 1.2 every 4 hours x5/day, flush 50 ml water before and after. This will provide 2850 kcal (52 kcal/kg), 132 g protein (2.4 g/kg), 375 g CHO (53% of kcals), 2 L free water with TF + flushes Lisa Bahman, [address] [place] [address], [address] [place] [address] Nutrition: 
Nutrition Consult for Tube Feeding Management (Dr. Paulino Pelletier) Assessment: 
Food/Nutrition Patient History: patient has PEG and uses tube feeding as sole source of nutrition. SLP notes patient does have some oral intake for pleasure. Patient uses blenderized tube feedings at home or osmolite 1.5. Patient agreeable to using osmolite 1.2 while hospitalized (on formulary). Lab Results Component Value Date/Time Sodium 138 10/03/2019 04:06 AM  
 Potassium 3.5 10/03/2019 04:06 AM  
 Chloride 104 10/03/2019 04:06 AM  
 CO2 26 10/03/2019 04:06 AM  
 Anion gap 8 10/03/2019 04:06 AM  
 Glucose 91 10/03/2019 04:06 AM  
 BUN 13 10/03/2019 04:06 AM  
 Creatinine 0.54 (L) 10/03/2019 04:06 AM  
 Calcium 8.7 10/03/2019 04:06 AM  
 Albumin 3.1 (L) 10/02/2019 05:31 PM  
 
Pertinent Medications: NS 125ml/hr, dulcolax prn, flagyl Enteral nutrition access: PEG, Patient and nurse indicate piece missing to be able to complete tube feeding. Nurse in contact with GI to obtain solution so feeding can begin. Anthropometrics:Height: 5' 11\" (180.3 cm),  Weight: 54.4 kg (120 lb), weight source unspecified, Body mass index is 16.74 kg/m². BMI class of underweight. Patient unsure if he has lost weight.  Records indicate severe weight loss, 7# (5.5%) weight loss in 1 month, 13# (9.8%) weight loss in 3 months. WT / BMI WEIGHT  
10/2/2019 120 lb  
10/1/2019 120 lb  
9/17/2019 125 lb  
9/9/2019 119 lb 12.8 oz  
9/4/2019 9/4/2019 127 lb  
9/4/2019 8/29/2019 130 lb  
8/27/2019 134 lb  
8/12/2019 133 lb  
7/24/2019 133 lb  
2/27/2019 140 lb  
2/26/2019 140 lb  
12/11/2018 147 lb  
11/21/2018 147 lb  
11/14/2018 144 lb  
4/23/2013 135 lb Macronutrient Needs: 54.5 kg listed bw 
EER:  5957-2094 kcal /day (30-35 kcal/kg) EPR:  78-94 grams protein/day (1-1.2 grams/kg IBW) CHO limit per day: 225 grams CHO/day (55% calories) Fluid/day: 1.6-1.9 liters/day (1ml/kcal/day) Diet: DIET NPO Intake/Comparative Standards: NPO Meets Criteria for Social/Environmental Related Malnutrition  
[] Severe Malnutrition, as evidenced by: 
 [] Nutritional intake of <50% of energy intake compared to estimated energy needs for > 1 month 
 [x] Weight loss of  >5% in 1 month, >7.5% in 3 months, or >10% in 6 months,>20% in 12 months 
 [] Severe loss of muscle mass 
 [] Severe loss of subcutaneous fat 
 [] Functional decline 
 [] Severe edema [x] Non-Severe (Moderate) Malnutrition, as evidenced by: 
 [] Nutritional intake of <75% of energy intake compared to estimated energy needs for > 3 months 
 [] Weight loss of 5% in 1 month, 7.5% in 3 months, 10% in 6 months, or 20% in 12 months 
 [x] Mild loss of muscle mass 
 [x] Mild loss of subcutaneous fat 
 [] Mild edema Nutrition Diagnosis: 
Inadequate energy intake related to NPO status as evidenced by chronic PEG tube feeding dependency, aspiration pneumonia/ swallowing deficit. Intervention: 
Meals and Snacks: NPO. Enteral Nutrition: 1IV Fluid: Reduce IVF to 50 ml/hr once tube feeding started to accommodate TF and flush volume. Mineral Supplement Therapy: Nutrition Support Orders for Electrolyte Management Replacements are activated and placed on the MAR. Coordination of Nutrition Care: Triston Sanchez RN Nutrition Discharge Plan: Continued PEG feeding.  
 
Franca Zuleta, RD, LD  
 [phone]

## 2019-10-03 NOTE — PROGRESS NOTES
Progress Note Active Hospital Problems Diagnosis Date Noted  Acquired hypothyroidism 10/03/2019  Dysphagia 10/03/2019  H/O laryngeal cancer 10/03/2019  Protein calorie malnutrition ([address] 10/03/2019  Pulmonary infiltrates 10/03/2019  Sinusitis 10/03/2019  Aspiration pneumonia ([address] 10/02/2019  Acute and chronic respiratory failure with hypoxia ([address] 09/04/2019  Aspiration, chronic pulmonary 09/04/2019 Assessment Sinusitis Patient and family states that, patient has not purulent drainage from both sinuses, as per pulmonary recommendation consulted the ENT Protein calorie malnutrition ([address] On tube feeding, consulted dietary H/O laryngeal cancer Status post chemoradiation, Dysphagia Currently on tube feedings Acquired hypothyroidism Continue his home medication Aspiration pneumonia ([address] On broad-spectrum IV antibiotics, follow the cultures Aspiration, chronic pulmonary Continue strict n.p.o. status, aspiration precautions * Acute and chronic respiratory failure with hypoxia (HCC) Continue the oxygen supplementation, discussed with the pulmonary Protein calorie malnutrition moderate Length of Stay: 1 Assessment  Subjective Patient lying on the bed still complaining of cough, greenish postnasal discharge Objective Review of Systems Constitution: Negative. HENT: Positive for congestion. Copious greenish postnasal discharge Eyes: Negative. Cardiovascular: Negative. Respiratory: Positive for cough, shortness of breath and sputum production. Endocrine: Negative. Hematologic/Lymphatic: Negative. Skin: Negative. Musculoskeletal: Negative. Gastrointestinal: Negative. Genitourinary: Negative. Neurological: Negative. Psychiatric/Behavioral: Negative. Physical Exam  
Constitutional: He is oriented to person, place, and time. He appears well-developed and well-nourished.   
HENT:  
 Head: Normocephalic and atraumatic. Mouth/Throat: Oropharynx is clear and moist.  
Eyes: Pupils are equal, round, and reactive to light. Conjunctivae and EOM are normal.  
Neck: Normal range of motion. Neck supple. Cardiovascular: Normal rate, regular rhythm, normal heart sounds and intact distal pulses. Pulmonary/Chest: Effort normal. He has rales. Abdominal: Soft. Bowel sounds are normal.  
Musculoskeletal: Normal range of motion. Neurological: He is alert and oriented to person, place, and time. No cranial nerve deficit. Skin: Skin is warm and dry. No rash noted. No erythema. Psychiatric: He has a normal mood and affect. No intake or output data in the 24 hours ending 10/03/19 1831 Patient Vitals for the past 24 hrs: 
 Temp Pulse Resp BP SpO2  
10/03/19 1512 98 °F (36.7 °C) 97 18 (!) 156/93 94 % 10/03/19 1404     93 % 10/03/19 1143 97.9 °F (36.6 °C) 92 20 92/54 91 % 10/03/19 1030     (!) 82 % 10/03/19 0832     (!) 87 % 10/03/19 0747 98.6 °F (37 °C) 92 18 120/69 90 % 10/03/19 0223     90 % 10/03/19 0158 98 °F (36.7 °C) 83 15 156/87 94 % 10/02/19 2314 97.5 °F (36.4 °C) 85 16 (!) 173/106 94 % 10/02/19 2100 97.9 °F (36.6 °C) 86 20 148/86 96 % 10/02/19 2032     90 % 10/02/19 1957  81 (!) 40 124/81 90 % 10/02/19 1955     90 % 10/02/19 1943  83 (!) 79 122/75   
10/02/19 1930  87 (!) 87  95 % 10/02/19 1913  84 (!) 31 (!) 142/92 93 % 10/02/19 1846  80 29 137/79 93 % 10/02/19 1833 97.6 °F (36.4 °C)     Lab Results Component Value Date/Time WBC 9.5 10/03/2019 04:06 AM  
 RBC 3.78 (L) 10/03/2019 04:06 AM  
 HGB 11.0 (L) 10/03/2019 04:06 AM  
 HCT 34.8 (L) 10/03/2019 04:06 AM  
  10/03/2019 04:06 AM  
 CO2 26 10/03/2019 04:06 AM  
 BUN 13 10/03/2019 04:06 AM  
 
 
XR Results (maximum last 3): Results from Hospital Encounter encounter on 10/02/19 XR CHEST PORT  
 Impression IMPRESSION: Chronic interstitial changes more evident at the lung bases. There 
are indeterminate very dense reticular nodular structures at the lung bases that 
may represent retained barium from a prior aspiration. No definite acute 
abnormality. CT Results (maximum last 3): Results from Hospital Encounter encounter on 09/03/19 CT CHEST WO CONT Impression IMPRESSION: 
1. Extensive tree-in-bud type opacities bilaterally with overall mild 
progression. These findings most commonly or secondary to bacterial infections 
other etiologies including mycobacterial infections and hypersensitivity 
pneumonitis could also have this appearance. 2. Numerous coarse parenchymal calcifications within the lower lung zones 
suggests prior granulomatous disease. Alveolar microlithiasis is thought to be 
less likely. 3. Debris-filled bronchi will be most compatible with aspiration. Confluent 
airspace opacities within the lower lobes have resolved. MRI Results (maximum last 3): Results from [facility] Encounter encounter on 04/23/13 MRI PELV W WO CONT Nuclear Medicine Results (maximum last 3): No results found for this or any previous visit. US Results (maximum last 3): Results from Hospital Encounter encounter on 08/15/19 US SCROTUM/TESTICLES Impression IMPRESSION: 
1. Enlarged, heterogeneous, and hypervascular right epididymis suggesting right 
epididymitis. No associated orchitis is currently seen. Scheduled Meds: 
Current Facility-Administered Medications Medication Dose Route Frequency  NUTRITIONAL SUPPORT ELECTROLYTE PRN ORDERS   Does Not Apply PRN  
 morphine injection 2 mg  2 mg IntraVENous Q4H PRN  
 sodium chloride (NS) flush 5-40 mL  5-40 mL IntraVENous Q8H  
 sodium chloride (NS) flush 5-40 mL  5-40 mL IntraVENous PRN  
 0.9% sodium chloride infusion  125 mL/hr IntraVENous CONTINUOUS  
 acetaminophen (TYLENOL) tablet 650 mg  650 mg Oral Q4H PRN  
  HYDROcodone-acetaminophen (NORCO) 5-325 mg per tablet 1 Tab  1 Tab Oral Q4H PRN  
 naloxone (NARCAN) injection 0.4 mg  0.4 mg IntraVENous PRN  
 bisacodyl (DULCOLAX) tablet 5 mg  5 mg Oral DAILY PRN  
 enoxaparin (LOVENOX) injection 40 mg  40 mg SubCUTAneous Q24H  
 albuterol-ipratropium (DUO-NEB) 2.5 MG-0.5 MG/3 ML  3 mL Nebulization Q6H RT  
 albuterol-ipratropium (DUO-NEB) 2.5 MG-0.5 MG/3 ML  3 mL Nebulization Q4H PRN  
 cefepime (MAXIPIME) 1 g in 0.9% sodium chloride (MBP/ADV) 50 mL  1 g IntraVENous Q8H  
 metroNIDAZOLE (FLAGYL) IVPB premix 500 mg  500 mg IntraVENous Q8H  
 thyroid (Pork) (ARMOUR) tablet 15 mg  15 mg Oral DAILY  predniSONE (DELTASONE) tablet 40 mg  40 mg Oral DAILY WITH BREAKFAST

## 2019-10-03 NOTE — PROGRESS NOTES
Sputum specimen obtained and sent to the lab. Pt sitting up in bed with daughter at bedside. Pt remains on 4 L NC At this time. Will monitor.

## 2019-10-03 NOTE — PROGRESS NOTES
Pt sitting up in bed with wife at bedside. Pt up in chair feeding himself through PEG. Pt was on RA with sat 82% at this time. When asked to place O2 on pt states \"I have to feed myself through PEG first\" pt given longer O2 tank and placed on 4 L NC with quick recovery to 90%.

## 2019-10-03 NOTE — PROGRESS NOTES
CM met with patient/spouse at bedside to discuss d/c plan. Patient laying in bed with eyes closed and spouse completed the intervention. Spouse state that patient is independent but do use a (cane) at times. Spouse states that patient independent with ADL's and unsure at this about d/c until they speak with the MD.  CM informed spouse that patient will continued to be followed for any needs that may occur. Care Management Interventions PCP Verified by CM: Yes(Anna Lopez MD) Mode of Transport at Discharge: Other (see comment)(Family) Transition of Care Consult (CM Consult): Discharge Planning Discharge Durable Medical Equipment: No 
Physical Therapy Consult: Yes Occupational Therapy Consult: Yes Speech Therapy Consult: No 
Current Support Network: Own Home, Lives with Spouse Confirm Follow Up Transport: Family Plan discussed with Pt/Family/Caregiver: Yes Freedom of Choice Offered: Yes [employer] Information Provided?: No 
Discharge Location Discharge Placement: Unable to determine at this time

## 2019-10-03 NOTE — PROGRESS NOTES
Pt resting in bed. No distress noted at this time. Pt on 3 L NC at this time. Pt and wife have self administered TF for today. Pt encouraged to call for assistance if needed call light in reach, door open will monitor.

## 2019-10-03 NOTE — PROGRESS NOTES
PT note: 
 
Chart reviewed and attempted PT evaluation this afternoon. Pt declined not wanting to ambulate at this time. Will check back tomorrow. Thanks, AI KumarT

## 2019-10-03 NOTE — PROGRESS NOTES
Pt and wife completed TF. Wife concerned pt isnt getting enough calories per our orders. Dietician called and message left to clarify.

## 2019-10-03 NOTE — ASSESSMENT & PLAN NOTE
Patient and family states that, patient has not purulent drainage from both sinuses, as per pulmonary recommendation consulted the ENT Evaluated by ENT on 10/3/2019, no new recommendation, continue the current medical therapy, outpatient follow-up

## 2019-10-03 NOTE — PROGRESS NOTES
Pt arrived to the unit  via stretcher accompany by transporter, admitted to room  823,A/Ox4, calm and pleasant, pt oriented to the unit , call light within reach , bed in low position  and locked,  oxygen via nasal cannula 4L, respirations even and unlabored, IV site 18G on right arm and 18G on left arm, no distress noted,skin assessment completed with Janet JOVEL, abdominal peg-tube noted, coccyx redness noted, pt refeused a allevyn in  [place], no open area noted.

## 2019-10-04 LAB
BRONCH. LAVAGE DIFF.,BR: NORMAL
EOSINOPHIL NFR BRONCH MANUAL: 1 %
IGG SERPL-MCNC: 1064 MG/DL (ref 610–1616)
LYMPHOCYTES NFR BRONCH MANUAL: 4 %
MACROPHAGES NFR BRONCH MANUAL: 2 %
NEUTROPHILS NFR BRONCH MANUAL: 93 %

## 2019-10-04 PROCEDURE — 87486 CHLMYD PNEUM DNA AMP PROBE: CPT

## 2019-10-04 PROCEDURE — 87106 FUNGI IDENTIFICATION YEAST: CPT

## 2019-10-04 PROCEDURE — 74011000250 HC RX REV CODE- 250: Performed by: HOSPITALIST

## 2019-10-04 PROCEDURE — 87077 CULTURE AEROBIC IDENTIFY: CPT

## 2019-10-04 PROCEDURE — 87186 SC STD MICRODIL/AGAR DIL: CPT

## 2019-10-04 PROCEDURE — 87799 DETECT AGENT NOS DNA QUANT: CPT

## 2019-10-04 PROCEDURE — 74011636637 HC RX REV CODE- 636/637: Performed by: HOSPITALIST

## 2019-10-04 PROCEDURE — 74011250636 HC RX REV CODE- 250/636

## 2019-10-04 PROCEDURE — 76040000025: Performed by: INTERNAL MEDICINE

## 2019-10-04 PROCEDURE — 88305 TISSUE EXAM BY PATHOLOGIST: CPT

## 2019-10-04 PROCEDURE — 87070 CULTURE OTHR SPECIMN AEROBIC: CPT

## 2019-10-04 PROCEDURE — 87116 MYCOBACTERIA CULTURE: CPT

## 2019-10-04 PROCEDURE — 99152 MOD SED SAME PHYS/QHP 5/>YRS: CPT | Performed by: INTERNAL MEDICINE

## 2019-10-04 PROCEDURE — 87305 ASPERGILLUS AG IA: CPT

## 2019-10-04 PROCEDURE — 88112 CYTOPATH CELL ENHANCE TECH: CPT

## 2019-10-04 PROCEDURE — 87102 FUNGUS ISOLATION CULTURE: CPT

## 2019-10-04 PROCEDURE — 31624 DX BRONCHOSCOPE/LAVAGE: CPT | Performed by: INTERNAL MEDICINE

## 2019-10-04 PROCEDURE — 65270000029 HC RM PRIVATE

## 2019-10-04 PROCEDURE — 0B9D8ZX DRAINAGE OF RIGHT MIDDLE LUNG LOBE, VIA NATURAL OR ARTIFICIAL OPENING ENDOSCOPIC, DIAGNOSTIC: ICD-10-PCS | Performed by: INTERNAL MEDICINE

## 2019-10-04 PROCEDURE — 0BC58ZZ EXTIRPATION OF MATTER FROM RIGHT MIDDLE LOBE BRONCHUS, VIA NATURAL OR ARTIFICIAL OPENING ENDOSCOPIC: ICD-10-PCS | Performed by: INTERNAL MEDICINE

## 2019-10-04 PROCEDURE — 87541 LEGION PNEUMO DNA AMP PROB: CPT

## 2019-10-04 PROCEDURE — 99232 SBSQ HOSP IP/OBS MODERATE 35: CPT | Performed by: INTERNAL MEDICINE

## 2019-10-04 PROCEDURE — 94640 AIRWAY INHALATION TREATMENT: CPT

## 2019-10-04 PROCEDURE — 99153 MOD SED SAME PHYS/QHP EA: CPT | Performed by: INTERNAL MEDICINE

## 2019-10-04 PROCEDURE — 36415 COLL VENOUS BLD VENIPUNCTURE: CPT

## 2019-10-04 PROCEDURE — 77030012699 HC VLV SUC CNTRL OCOA -A: Performed by: INTERNAL MEDICINE

## 2019-10-04 PROCEDURE — 87389 HIV-1 AG W/HIV-1&-2 AB AG IA: CPT

## 2019-10-04 PROCEDURE — 77010033678 HC OXYGEN DAILY

## 2019-10-04 PROCEDURE — 94760 N-INVAS EAR/PLS OXIMETRY 1: CPT

## 2019-10-04 PROCEDURE — 89051 BODY FLUID CELL COUNT: CPT

## 2019-10-04 PROCEDURE — 74011250636 HC RX REV CODE- 250/636: Performed by: INTERNAL MEDICINE

## 2019-10-04 PROCEDURE — 74011250637 HC RX REV CODE- 250/637: Performed by: HOSPITALIST

## 2019-10-04 PROCEDURE — 74011000258 HC RX REV CODE- 258: Performed by: HOSPITALIST

## 2019-10-04 PROCEDURE — 87633 RESP VIRUS 12-25 TARGETS: CPT

## 2019-10-04 PROCEDURE — 31645 BRNCHSC W/THER ASPIR 1ST: CPT | Performed by: INTERNAL MEDICINE

## 2019-10-04 PROCEDURE — 74011250636 HC RX REV CODE- 250/636: Performed by: HOSPITALIST

## 2019-10-04 RX ORDER — SODIUM CHLORIDE 0.9 % (FLUSH) 0.9 %
5-40 SYRINGE (ML) INJECTION EVERY 8 HOURS
Status: DISCONTINUED | OUTPATIENT
Start: 2019-10-04 | End: 2019-10-05 | Stop reason: HOSPADM

## 2019-10-04 RX ORDER — NALOXONE HYDROCHLORIDE 0.4 MG/ML
0.4 INJECTION, SOLUTION INTRAMUSCULAR; INTRAVENOUS; SUBCUTANEOUS
Status: DISCONTINUED | OUTPATIENT
Start: 2019-10-04 | End: 2019-10-04 | Stop reason: HOSPADM

## 2019-10-04 RX ORDER — SODIUM CHLORIDE 9 MG/ML
1000 INJECTION, SOLUTION INTRAVENOUS CONTINUOUS
Status: DISCONTINUED | OUTPATIENT
Start: 2019-10-04 | End: 2019-10-04 | Stop reason: HOSPADM

## 2019-10-04 RX ORDER — MIDAZOLAM HYDROCHLORIDE 1 MG/ML
.25-5 INJECTION, SOLUTION INTRAMUSCULAR; INTRAVENOUS
Status: DISCONTINUED | OUTPATIENT
Start: 2019-10-04 | End: 2019-10-04 | Stop reason: HOSPADM

## 2019-10-04 RX ORDER — SODIUM CHLORIDE 0.9 % (FLUSH) 0.9 %
5-40 SYRINGE (ML) INJECTION AS NEEDED
Status: DISCONTINUED | OUTPATIENT
Start: 2019-10-04 | End: 2019-10-05 | Stop reason: HOSPADM

## 2019-10-04 RX ORDER — FENTANYL CITRATE 50 UG/ML
25 INJECTION, SOLUTION INTRAMUSCULAR; INTRAVENOUS
Status: DISCONTINUED | OUTPATIENT
Start: 2019-10-04 | End: 2019-10-04 | Stop reason: HOSPADM

## 2019-10-04 RX ORDER — FLUMAZENIL 0.1 MG/ML
0.2 INJECTION INTRAVENOUS
Status: DISCONTINUED | OUTPATIENT
Start: 2019-10-04 | End: 2019-10-04 | Stop reason: HOSPADM

## 2019-10-04 RX ADMIN — CEFEPIME HYDROCHLORIDE 1 G: 1 INJECTION, POWDER, FOR SOLUTION INTRAMUSCULAR; INTRAVENOUS at 02:27

## 2019-10-04 RX ADMIN — Medication 10 ML: at 06:02

## 2019-10-04 RX ADMIN — Medication 10 ML: at 21:54

## 2019-10-04 RX ADMIN — MORPHINE SULFATE 2 MG: 2 INJECTION, SOLUTION INTRAMUSCULAR; INTRAVENOUS at 16:30

## 2019-10-04 RX ADMIN — CEFEPIME HYDROCHLORIDE 1 G: 1 INJECTION, POWDER, FOR SOLUTION INTRAMUSCULAR; INTRAVENOUS at 18:04

## 2019-10-04 RX ADMIN — MORPHINE SULFATE 2 MG: 2 INJECTION, SOLUTION INTRAMUSCULAR; INTRAVENOUS at 06:42

## 2019-10-04 RX ADMIN — Medication 5 ML: at 13:21

## 2019-10-04 RX ADMIN — MIDAZOLAM 1 MG: 1 INJECTION INTRAMUSCULAR; INTRAVENOUS at 15:04

## 2019-10-04 RX ADMIN — MORPHINE SULFATE 2 MG: 2 INJECTION, SOLUTION INTRAMUSCULAR; INTRAVENOUS at 21:44

## 2019-10-04 RX ADMIN — METRONIDAZOLE 500 MG: 500 INJECTION, SOLUTION INTRAVENOUS at 13:20

## 2019-10-04 RX ADMIN — MORPHINE SULFATE 2 MG: 2 INJECTION, SOLUTION INTRAMUSCULAR; INTRAVENOUS at 11:14

## 2019-10-04 RX ADMIN — METRONIDAZOLE 500 MG: 500 INJECTION, SOLUTION INTRAVENOUS at 21:44

## 2019-10-04 RX ADMIN — PREDNISONE 40 MG: 10 TABLET ORAL at 09:09

## 2019-10-04 RX ADMIN — Medication 10 ML: at 16:30

## 2019-10-04 RX ADMIN — FENTANYL CITRATE 50 MCG: 50 INJECTION INTRAMUSCULAR; INTRAVENOUS at 14:58

## 2019-10-04 RX ADMIN — MORPHINE SULFATE 2 MG: 2 INJECTION, SOLUTION INTRAMUSCULAR; INTRAVENOUS at 02:39

## 2019-10-04 RX ADMIN — Medication 10 ML: at 21:45

## 2019-10-04 RX ADMIN — HYDROCODONE BITARTRATE AND ACETAMINOPHEN 1 TABLET: 5; 325 TABLET ORAL at 09:15

## 2019-10-04 RX ADMIN — Medication 10 ML: at 01:48

## 2019-10-04 RX ADMIN — SODIUM CHLORIDE 1000 ML: 900 INJECTION, SOLUTION INTRAVENOUS at 14:10

## 2019-10-04 RX ADMIN — FENTANYL CITRATE 50 MCG: 50 INJECTION INTRAMUSCULAR; INTRAVENOUS at 15:07

## 2019-10-04 RX ADMIN — LEVOTHYROXINE, LIOTHYRONINE 15 MG: 38; 9 TABLET ORAL at 09:09

## 2019-10-04 RX ADMIN — FENTANYL CITRATE 50 MCG: 50 INJECTION INTRAMUSCULAR; INTRAVENOUS at 15:01

## 2019-10-04 RX ADMIN — MIDAZOLAM 2 MG: 1 INJECTION INTRAMUSCULAR; INTRAVENOUS at 14:58

## 2019-10-04 RX ADMIN — CEFEPIME HYDROCHLORIDE 1 G: 1 INJECTION, POWDER, FOR SOLUTION INTRAMUSCULAR; INTRAVENOUS at 09:09

## 2019-10-04 RX ADMIN — IPRATROPIUM BROMIDE AND ALBUTEROL SULFATE 3 ML: .5; 3 SOLUTION RESPIRATORY (INHALATION) at 02:34

## 2019-10-04 RX ADMIN — METRONIDAZOLE 500 MG: 500 INJECTION, SOLUTION INTRAVENOUS at 06:03

## 2019-10-04 RX ADMIN — FENTANYL CITRATE 50 MCG: 50 INJECTION INTRAMUSCULAR; INTRAVENOUS at 15:09

## 2019-10-04 NOTE — PROGRESS NOTES
Pt returned to room 823 alert and oriented, NAD, VSS, SaO2 85% on RA, rebounding to 92% on 3L NC. Pt anxious and upset over family dynamics.

## 2019-10-04 NOTE — PROGRESS NOTES
Nutrition F/U: 
Nutrition Consult for Tube Feeding Management (Dr. Rolando Pearce) Assessment: 
Food/Nutrition Patient History: patient has PEG and uses tube feeding as sole source of nutrition. SLP notes patient does have some oral intake for pleasure. Patient uses blenderized tube feedings at home or osmolite 1.5. Patient agreeable to using osmolite 1.2 while hospitalized (on formulary). Tube feeding going well per patient and nurse- no complaints or intolerances noted. Lab Results Component Value Date/Time Sodium 138 10/03/2019 04:06 AM  
 Potassium 3.5 10/03/2019 04:06 AM  
 Chloride 104 10/03/2019 04:06 AM  
 CO2 26 10/03/2019 04:06 AM  
 Anion gap 8 10/03/2019 04:06 AM  
 Glucose 91 10/03/2019 04:06 AM  
 BUN 13 10/03/2019 04:06 AM  
 Creatinine 0.54 (L) 10/03/2019 04:06 AM  
 Calcium 8.7 10/03/2019 04:06 AM  
 Albumin 3.1 (L) 10/02/2019 05:31 PM  
 
Pertinent Medications: NS 25ml/hr, dulcolax prn, flagyl Enteral nutrition access: PEG Abdomen: flat with active BS Last BM: 10/3 Anthropometrics:Height: 5' 11\" (180.3 cm),  Weight: 54.7 kg (120 lb 9.6 oz), weight source unspecified, Body mass index is 16.82 kg/m². BMI class of underweight. Patient unsure if he has lost weight. Records indicate severe weight loss, 7# (5.5%) weight loss in 1 month, 13# (9.8%) weight loss in 3 months. Patient states his highest body weight has been 145# and would like to regain to 140-145#. Discussed IBW calculation and agreed 175# would be an unrealistic weight for him. Macronutrient Needs: 66 kg desired bw **Note home feeding goal is 3000 kcals and patient has continued to lose weight.** 
Energy goal:  9601-7832 kcal /day (40-45 kcal/kg) EPR:  78-94 grams protein/day (1-1.2 grams/kg IBW) CHO per day: 362 grams CHO/day (55% calories) Fluid/day: 1.6-1.9 liters/day (1ml/kcal/day) Diet: NPO, Tube Feeding (Osmolite 1.2, 2 cartons every 4 hours x5/day starting at 6 AM. Intake/Comparative Standards: NPO, 2 cartons of Osmolite 1.2 every 4 hours x5/day, flush 50 ml water before and after. This will provide 2850 kcal (100% of kcal goal), 132 g protein (2.4 g/kg), 375 g CHO (53% of kcals), 2 L free water with TF + flushes Meets Criteria for Social/Environmental Related Malnutrition  
[] Severe Malnutrition, as evidenced by: 
 [] Nutritional intake of <50% of energy intake compared to estimated energy needs for > 1 month 
 [x] Weight loss of  >5% in 1 month, >7.5% in 3 months, or >10% in 6 months,>20% in 12 months 
 [] Severe loss of muscle mass 
 [] Severe loss of subcutaneous fat 
 [] Functional decline 
 [] Severe edema [x] Non-Severe (Moderate) Malnutrition, as evidenced by: 
 [] Nutritional intake of <75% of energy intake compared to estimated energy needs for > 3 months 
 [] Weight loss of 5% in 1 month, 7.5% in 3 months, 10% in 6 months, or 20% in 12 months 
 [x] Mild loss of muscle mass 
 [x] Mild loss of subcutaneous fat 
 [] Mild edema Nutrition Diagnosis: 
Inadequate energy intake related to NPO status as evidenced by chronic PEG tube feeding dependency, aspiration pneumonia/ swallowing deficit. Intervention: 
Meals and Snacks: NPO. Enteral Nutrition: Continue current. IV Fluid: TF + flushes provides 2L free water. IVF was reduced to 25 ml/hr (600 ml/day) Mineral Supplement Therapy: Nutrition Support Orders for Electrolyte Management Replacements are activated and placed on the MAR. Coordination of Nutrition Care: Rufina Carpio RN Nutrition Discharge Plan: Continued PEG feeding. Promote healthful weight gain to [facility] 140-145# Rashi Khan, [address] [place] [address], [address]

## 2019-10-04 NOTE — H&P
Date of Surgery Update: 
Alexis Rousseau was seen and examined. History and physical has been reviewed. The patient has been examined.  There have been no significant clinical changes since the completion of the originally dated History and Physical. 
 
Signed By: Jace Valentin MD   
 October 4, 2019 3:21 PM

## 2019-10-04 NOTE — PROGRESS NOTES
Pt bedside report received from [facility], pt resting in bed  watching Tv, assessment completed ,  pt AxOx4 bed on low and locked position with floor free of objects,  call light within reach, pt on oxygen  @4L NC, respirations even and non labored, pt verbalized understanding to call for needs,  no c/o pain, no distress noted.

## 2019-10-04 NOTE — CONSULTS
Infectious Disease Consult Today's Date: 10/4/2019 Admit Date: 10/2/2019 Impression: · Infectious vs non-infectious bronchiolitis: CT notes concern for centilobular nodules, s/p bronch (10/4), copious purulent secretions: studies pending · Fever/leukocytosis, resolved · Hx laryngeal cancer s/p XRT/chemo 2005-PEG for feeding · Recent aspiration pneumonia r/t NPO noncompliance-s/p treatment with resolution Plan:  
· He is very eager to be discharged · Follow bronch studies · Check Quantiferon Gold, HIV screen prior to discharge · WBC and Fever seem to better on antbx · Change antbx to Augmentin PO for 7 days · Will arrange ID follow up in 4-6 weeks to follow bronch results Anti-infectives: · Cefepime 10/2- 
· Flagyl 10/2- Subjective:  
Date of Consultation:  October 4, 2019 Referring Physician: Dr Bruna Alexander Patient is a 60 y.o. male who has a history significant for laryngeal cancer treated in 2005 with chemo/XRT, and PEG placement for feeding and strict NPO recommendations. He was recently admitted 9/4-9/10 with aspiration pneumonia felt to be related to non-compliance with NPO status; CT with concerning nodules and he underwent a bronch with cultures/path negative with the exception of light E.coli-he was treated with Cefepime and discharged on Augmentin. After this he was seen by ENT at [facility] on 9/27 for c/p persistent rhinosinusitis, undergoing a scope with maxillary drainage and culture: E.coli, not treated as antbx have not helped in the past. He presented to the ED with c/o fever 104, chills, leukocytosis, CT chest notes extensive bilateral nodules described as tree-in-bud, concerning for infectious vs inflammatory vs malignant changes. He was taken down for a bronch today. Currently on Cefepime and Flagyl, ID consulted to make treatment recommendations.  Pt seem s/p bronch, feeling unwell, reports chest discomfort with cough, fevers better, no nausea, some GI distress no diarrhea. Patient Active Problem List  
Diagnosis Code  Feeding difficulties and mismanagement R63.3  Acute and chronic respiratory failure with hypoxia ([facility]) J96.21  
 Aspiration, chronic pulmonary T17.908A  Aspiration pneumonia ([facility] J69.0  Acquired hypothyroidism E03.9  Dysphagia R13.10  
 H/O laryngeal cancer Z85.21  
 Protein calorie malnutrition (HCC) E46  
 Pulmonary infiltrates R91.8  Sinusitis J32.9 Past Medical History:  
Diagnosis Date  Acute maxillary sinusitis 2019  Chronic pain   
 arms  Hypothyroidism   
 controlled with medication  PEG (percutaneous endoscopic gastrostomy) adjustment/replacement/removal ([facility]  Pneumonia  Pneumonia of both lungs due to Escherichia coli ([facility] 2019  Throat cancer ([facility] 2005 Family History Problem Relation Age of Onset  Diabetes Mother Social History Tobacco Use  Smoking status: Former Smoker Packs/day: 1.00 Years: 20.00 Pack years: 20.00 Types: Cigarettes Last attempt to quit:  Years since quittin.7  Smokeless tobacco: Never Used Substance Use Topics  Alcohol use: No  
  Frequency: Never Past Surgical History:  
Procedure Laterality Date  [address] [place] [address] UNLISTED   PEG tube insertion  COLONOSCOPY N/A 2019 COLONOSCOPY BMI 19 performed by Stacy Iglesias MD at [address] COLONOSCOPY Prior to Admission medications Medication Sig Start Date End Date Taking? Authorizing Provider  
amoxicillin-clavulanate (AUGMENTIN) 875-125 mg per tablet Take 1 Tab by mouth two (2) times a day. 10/1/19   Charito Allan MD  
nystatin (MYCOSTATIN) 100,000 unit/mL suspension Take 5 mL by mouth four (4) times daily. swish and spit 19   Brittney Schuster MD  
magic mouthwash solution Magic Mouthwash wo Xylocaine Hydrocortisone powder 15 mg Nystatin 15 cc Benadryl Elixir 30 cc Tetracycline 500mg QS to 120 cc with water 1 teaspoon swish and spit out after meals and at bedtime 19   Tonny Beckwith MD  
thyroid, Pork,  [facility] THYROID) 15 mg tablet 1 q daily 18   Jeffrey Banegas MD  
 
 
Allergies Allergen Reactions  Demerol [Meperidine] Rash Review of Systems:  A comprehensive review of systems was negative except for that written in the History of Present Illness. Objective:  
 
Visit Vitals BP (!) 152/91 Pulse 89 Temp 98.1 °F (36.7 °C) Resp 14 Ht 5' 11\" (1.803 m) Wt 54.7 kg (120 lb 9.6 oz) SpO2 94% BMI 16.82 kg/m² Temp (24hrs), Av.4 °F (36.9 °C), Min:98 °F (36.7 °C), Max:99.2 °F (37.3 °C) Lines:  Peripheral IV:   R arm Physical Exam:   
General:  Alert, cooperative, thin, appears stated age Eyes:  Sclera anicteric. Pupils equally round and reactive to light. Mouth/Throat: Mucous membranes normal, oral pharynx clear Neck: Supple Lungs:   Clear to auscultation bilaterally anterior fields, NC O2, good effort CV:  Regular rate and rhythm,no murmur, click, rub or gallop Abdomen:   Soft, non-tender. bowel sounds normal. non-distended Extremities: No cyanosis or edema Skin: Skin color, texture, turgor normal. no acute rash or lesions Lymph nodes: Cervical and supraclavicular normal  
Musculoskeletal: No swelling or deformity Lines/Devices:  Intact, no erythema, drainage or tenderness Psych: Alert and oriented, normal mood affect given the setting Data Review: CBC: 
Recent Labs 10/03/19 
0406 10/02/19 
1731 WBC 9.5 14.2*  
GRANS 76 93* MONOS 8 2* EOS 1 0* ANEU 7.1 13.2* ABL 1.5 0.7 HGB 11.0* 12.5*  
HCT 34.8* 40.1*  307 BMP: 
Recent Labs 10/03/19 
0406 10/02/19 
1731 CREA 0.54* 0.65* BUN 13 11  135* K 3.5 4.1  99 CO2 26 30 AGAP 8 6* GLU 91 125* LFTS: 
Recent Labs 10/02/19 
1731 TBILI 0.5 ALT 11* SGOT 8* AP 69  
TP 7.7 ALB 3.1* Microbiology:  
 
All Micro Results Procedure Component Value Units Date/Time CULTURE, RESPIRATORY/SPUTUM/BRONCH Shawn Tana STAIN [[id number]] Order Status:  Sent Specimen:  Sputum CULTURE, RESPIRATORY/SPUTUM/BRONCH Audrey Michel [[id number]] Collected:  10/03/19 1818 Order Status:  Completed Specimen:  Sputum Updated:  10/04/19 1159 Special Requests: NO SPECIAL REQUESTS     
  GRAM STAIN 10 TO 65 WBC'S/OIF  
   0 TO 1 EPITHELIAL CELLS SEEN /OIF  
      
  FEW GRAM POSITIVE COCCI IN PAIRS CHAINS AND CLUSTERS  
     
   FEW GRAM NEGATIVE RODS 4+ MUCUS PRESENT Culture result:    
  NO GROWTH AFTER SHORT PERIOD OF INCUBATION. FURTHER RESULTS TO FOLLOW AFTER OVERNIGHT INCUBATION. CULTURE, BLOOD [[id number]] Collected:  10/02/19 1725 Order Status:  Completed Specimen:  Blood Updated:  10/04/19 7906 Special Requests: --     
  RIGHT Antecubital 
  
  Culture result: NO GROWTH 2 DAYS     
 CULTURE, BLOOD [[id number]] Collected:  10/02/19 1731 Order Status:  Completed Specimen:  Blood Updated:  10/04/19 0741 Special Requests: --     
  LEFT Antecubital 
  
  Culture result: NO GROWTH 2 DAYS Imaging:  
CT chest without contrast 10/3/19 IMPRESSION: 
  
1. Extensive diffuse tiny centrilobular nodules (tree-in-bud configuration). Additionally there are filling defects within the lower lobe bronchi, causing 
near obstruction. Diagnostic considerations includes infectious (including 
fungal and tuberculosis etiology) and inflammatory bronchiolitis. Airway spread 
of bronchoalveolar carcinoma should also be considered. 
  
2. Stable coarse calcifications in the lower lobes, nonspecific but may be 
secondary to prior granuloma disease. 
  
3. No focal pulmonary consolidation. Signed By: Bertha Vicente NP October 4, 2019

## 2019-10-04 NOTE — PROGRESS NOTES
TRANSFER - IN REPORT: 
 
Verbal report received from [address] on Isael Tapia  being received from [phone] for ordered procedure Report consisted of patients Situation, Background, Assessment and  
Recommendations(SBAR). Information from the following report(s) SBAR and MAR was reviewed with the receiving nurse. Opportunity for questions and clarification was provided. Pt reported last bolus tube feed at 1000, Dr. Dave Viera informed, stated no more feedings to be administer now.

## 2019-10-04 NOTE — PROGRESS NOTES
Pt received schedule medications and prn pain med call light within reach , bed on low position and locked, pt able to make needs known , no discomfort noted.

## 2019-10-04 NOTE — PROGRESS NOTES
PT note: 
 
Chart reviewed. Pt seen ambulating laps in hallway independently with good balance noted. Will discharge from PT at this time. Thanks, AI SamayoaT

## 2019-10-04 NOTE — PROGRESS NOTES
Pt bedside report received from [address] [place] [address], pt resting in bed  watching Tv, assessment completed ,  pt AxOx4 bed on low and locked position with floor free of objects,  call light within reach, pt on oxygen  @4L NC, respirations even and non labored, pt verbalized understanding to call for needs,  no c/o pain, no distress noted.

## 2019-10-04 NOTE — PROGRESS NOTES
TRANSFER - OUT REPORT: 
 
Verbal report given to Marlo Monique RN on Livia Whaley  being transferred to Room 823(unit) for routine post - op Report consisted of patients Situation, Background, Assessment and  
Recommendations(SBAR). Information from the following report(s) SBAR, Procedure Summary and MAR was reviewed with the receiving nurse. Lines:  
Peripheral IV 10/02/19 Right Antecubital (Active) Site Assessment Clean, dry, & intact 10/4/2019  2:15 PM  
Phlebitis Assessment 0 10/4/2019  2:15 PM  
Infiltration Assessment 0 10/4/2019  2:15 PM  
Dressing Status Clean, dry, & intact 10/4/2019  2:15 PM  
Dressing Type Transparent;Tape 10/4/2019  2:15 PM  
Hub Color/Line Status Patent; Flushed 10/4/2019  9:19 AM  
Action Taken Tubing changed 10/4/2019  2:15 PM  
   
Peripheral IV 10/02/19 Left Antecubital (Active) Site Assessment Clean, dry, & intact 10/4/2019  2:15 PM  
Phlebitis Assessment 0 10/4/2019  2:15 PM  
Infiltration Assessment 0 10/4/2019  2:15 PM  
Dressing Status Clean, dry, & intact 10/4/2019  2:15 PM  
Dressing Type Transparent;Tape 10/4/2019  2:15 PM  
Hub Color/Line Status Patent; Flushed 10/4/2019  9:19 AM  
  
 
Opportunity for questions and clarification was provided. Patient transported with: 
 O2 @ 3 liters

## 2019-10-04 NOTE — PROGRESS NOTES
Problem: Pneumonia: Day 3 Goal: Activity/Safety Outcome: Progressing Towards Goal 
Goal: Diagnostic Test/Procedures Outcome: Progressing Towards Goal 
Goal: Nutrition/Diet Outcome: Progressing Towards Goal 
Goal: Medications Outcome: Progressing Towards Goal 
Goal: Respiratory Outcome: Progressing Towards Goal 
Goal: Treatments/Interventions/Procedures Outcome: Progressing Towards Goal

## 2019-10-04 NOTE — PROGRESS NOTES
End of shift report given to [facility] , pt is stable  resting in bed, call light within reach, bed locked and low position, pt respirations even and unlabored, no distress noted.

## 2019-10-04 NOTE — PROGRESS NOTES
Progress Note Active Hospital Problems Diagnosis Date Noted  Acquired hypothyroidism 10/03/2019  Dysphagia 10/03/2019  H/O laryngeal cancer 10/03/2019  Protein calorie malnutrition ([address] 10/03/2019  Pulmonary infiltrates 10/03/2019  Sinusitis 10/03/2019  Aspiration pneumonia ([address] 10/02/2019  Acute and chronic respiratory failure with hypoxia ([address] 09/04/2019  Aspiration, chronic pulmonary 09/04/2019 Assessment Sinusitis Patient and family states that, patient has not purulent drainage from both sinuses, as per pulmonary recommendation consulted the ENT Evaluated by ENT on 10/3/2019, no new recommendation, continue the current medical therapy, outpatient follow-up Protein calorie malnutrition ([address] On tube feeding, consulted dietary H/O laryngeal cancer Status post chemoradiation, Dysphagia Currently on tube feedings Acquired hypothyroidism Continue his home medication Aspiration pneumonia ([address] On broad-spectrum IV antibiotics, patient for bronchoscopy, follow the cultures Aspiration, chronic pulmonary Continue strict n.p.o. status, aspiration precautions * Acute and chronic respiratory failure with hypoxia (HCC) Continue the oxygen supplementation, discussed with the pulmonary Protein calorie malnutrition moderate Length of Stay: 2 Assessment  Subjective Patient lying on the bed still complaining of cough, greenish postnasal discharge Patient states his shortness of breath has slightly better Objective Review of Systems Constitution: Negative. HENT: Positive for congestion. Copious greenish postnasal discharge Eyes: Negative. Cardiovascular: Negative. Respiratory: Positive for cough, shortness of breath and sputum production. Endocrine: Negative. Hematologic/Lymphatic: Negative. Skin: Negative. Musculoskeletal: Negative. Gastrointestinal: Negative. Genitourinary: Negative. Neurological: Negative. Psychiatric/Behavioral: Negative. Physical Exam  
Constitutional: He is oriented to person, place, and time. He appears well-developed and well-nourished. HENT:  
Head: Normocephalic and atraumatic. Mouth/Throat: Oropharynx is clear and moist.  
Eyes: Pupils are equal, round, and reactive to light. Conjunctivae and EOM are normal.  
Neck: Normal range of motion. Neck supple. Cardiovascular: Normal rate, regular rhythm, normal heart sounds and intact distal pulses. Pulmonary/Chest: Effort normal. He has rales. Abdominal: Soft. Bowel sounds are normal.  
Musculoskeletal: Normal range of motion. Neurological: He is alert and oriented to person, place, and time. No cranial nerve deficit. Skin: Skin is warm and dry. No rash noted. No erythema. Psychiatric: He has a normal mood and affect. Intake/Output Summary (Last 24 hours) at 10/4/2019 1649 Last data filed at 10/4/2019 1637 Gross per 24 hour Intake 1860 ml Output  Net 1860 ml Patient Vitals for the past 24 hrs: 
 Temp Pulse Resp BP SpO2  
10/04/19 1604     92 % 10/04/19 1603 98.4 °F (36.9 °C) 84 20 109/69 (!) 84 % 10/04/19 1547  80 20 144/90 95 % 10/04/19 1537  81 20 (!) 171/103 95 % 10/04/19 1527  81 20 105/68 (!) 89 % 10/04/19 1522  82 20 118/66 92 % 10/04/19 1517  84 20 136/87 95 % 10/04/19 1512  95 18 139/90 96 % 10/04/19 1507  82 18 132/88 97 % 10/04/19 1503  81 16 120/79 96 % 10/04/19 1457  85 18 130/90 94 % 10/04/19 1453  86 16 (!) 140/91 94 % 10/04/19 1447  85 16 124/81 93 % 10/04/19 1414  89 14 (!) 152/91 94 % 10/04/19 1401 98.1 °F (36.7 °C)  14    
10/04/19 1150  92  121/88   
10/04/19 1127 98.1 °F (36.7 °C) 88 18 92/52 94 % 10/04/19 0745     94 % 10/04/19 0640 99.2 °F (37.3 °C) 90 18 129/85 100 % 10/04/19 0234 98.7 °F (37.1 °C) 95 19 152/83 92 % 10/03/19 1949     97 % 10/03/19 1921 98.5 °F (36.9 °C) 83 17 114/78 94 % No results found for: WBC, RBC, HGB, HCT, PLT, SODIUM, POTASSIUM, CHLORIDE, CO2, BUN, INR, CKMB, PH, BE, HGBEXT, HCTEXT, PLTEXT, INREXT, HGBEXT, HCTEXT, PLTEXT, INREXT 
 
XR Results (maximum last 3): Results from Hospital Encounter encounter on 10/02/19 XR CHEST PORT Impression IMPRESSION: Chronic interstitial changes more evident at the lung bases. There 
are indeterminate very dense reticular nodular structures at the lung bases that 
may represent retained barium from a prior aspiration. No definite acute 
abnormality. CT Results (maximum last 3): Results from Hospital Encounter encounter on 10/02/19 CT CHEST WO CONT Impression IMPRESSION: 
 
1. Extensive diffuse tiny centrilobular nodules (tree-in-bud configuration). Additionally there are filling defects within the lower lobe bronchi, causing 
near obstruction. Diagnostic considerations includes infectious (including 
fungal and tuberculosis etiology) and inflammatory bronchiolitis. Airway spread 
of bronchoalveolar carcinoma should also be considered. 2. Stable coarse calcifications in the lower lobes, nonspecific but may be 
secondary to prior granuloma disease. 3. No focal pulmonary consolidation. Results from Hospital Encounter encounter on 09/03/19 CT CHEST WO CONT Impression IMPRESSION: 
1. Extensive tree-in-bud type opacities bilaterally with overall mild 
progression. These findings most commonly or secondary to bacterial infections 
other etiologies including mycobacterial infections and hypersensitivity 
pneumonitis could also have this appearance. 2. Numerous coarse parenchymal calcifications within the lower lung zones 
suggests prior granulomatous disease. Alveolar microlithiasis is thought to be 
less likely. 3. Debris-filled bronchi will be most compatible with aspiration. Confluent 
airspace opacities within the lower lobes have resolved. MRI Results (maximum last 3): Results from [facility] Encounter encounter on 04/23/13 MRI PELV W WO CONT Nuclear Medicine Results (maximum last 3): No results found for this or any previous visit. US Results (maximum last 3): Results from Hospital Encounter encounter on 08/15/19 US SCROTUM/TESTICLES Impression IMPRESSION: 
1. Enlarged, heterogeneous, and hypervascular right epididymis suggesting right 
epididymitis. No associated orchitis is currently seen. Scheduled Meds: 
Current Facility-Administered Medications Medication Dose Route Frequency  sodium chloride (NS) flush 5-40 mL  5-40 mL IntraVENous Q8H  
 sodium chloride (NS) flush 5-40 mL  5-40 mL IntraVENous PRN  
 NUTRITIONAL SUPPORT ELECTROLYTE PRN ORDERS   Does Not Apply PRN  
 morphine injection 2 mg  2 mg IntraVENous Q4H PRN  
 sodium chloride (NS) flush 5-40 mL  5-40 mL IntraVENous Q8H  
 sodium chloride (NS) flush 5-40 mL  5-40 mL IntraVENous PRN  
 acetaminophen (TYLENOL) tablet 650 mg  650 mg Oral Q4H PRN  
 HYDROcodone-acetaminophen (NORCO) 5-325 mg per tablet 1 Tab  1 Tab Oral Q4H PRN  
 naloxone (NARCAN) injection 0.4 mg  0.4 mg IntraVENous PRN  
 bisacodyl (DULCOLAX) tablet 5 mg  5 mg Oral DAILY PRN  
 enoxaparin (LOVENOX) injection 40 mg  40 mg SubCUTAneous Q24H  
 albuterol-ipratropium (DUO-NEB) 2.5 MG-0.5 MG/3 ML  3 mL Nebulization Q6H RT  
 albuterol-ipratropium (DUO-NEB) 2.5 MG-0.5 MG/3 ML  3 mL Nebulization Q4H PRN  
 cefepime (MAXIPIME) 1 g in 0.9% sodium chloride (MBP/ADV) 50 mL  1 g IntraVENous Q8H  
 metroNIDAZOLE (FLAGYL) IVPB premix 500 mg  500 mg IntraVENous Q8H  
 thyroid (Pork) (ARMOUR) tablet 15 mg  15 mg Oral DAILY  predniSONE (DELTASONE) tablet 40 mg  40 mg Oral DAILY WITH BREAKFAST

## 2019-10-04 NOTE — PROGRESS NOTES
Mauricio Syed in [place] lab, will attempt bronchoscopy today. Last TF bolus at 1000 (pt self feeds), pt instructed to hold TF at this time in preparation for procedure. Pt verbalizes understanding.

## 2019-10-04 NOTE — PROGRESS NOTES
Pt c/o persistent throbbing upper sternal chest pain that's worse when laying down. Medicated per MAR.

## 2019-10-04 NOTE — CDMP QUERY
Patient admitted with aspiration PNA/acute respiratory failure and has RD consult for TF management and \"protein calorie malnutrition\" documented. Please document in progress note and/or discharge summary if you are evaluating/treating: ? Malnutrition, moderate ? Malnutrition, mild ? Protein calorie malnutrition moderate ? Protein calorie malnutrition mild ? Other (please specify) ? Unable to determine The medical record reflects the following: 
  Risk Factors: Laryngeal cancer, PEG feeding dependent Clinical Indicators: Per ASPEN criteria: 7# loss in 1 month and 13# within 3 months c/w severe weight loss, Mild loss of muscle mass, Mild loss of subcutaneous fat c/w moderate malnutrition per RD Treatment: RD consult for TF management Thanks, Meli Keen, RN, BSN, CDS Clinical Documentation Improvement 
[phone]

## 2019-10-04 NOTE — PROGRESS NOTES
Marta Lin Admission Date: 10/2/2019 Daily Progress Note: 10/4/2019 Patient is a 60 y.o.  male seen and evaluated at the request of Dr. Bre Hawkins. He is a 60y.o. 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  He underwent a CT of the chest on 7/6 which revealed diffuse lower lobe predominant nodular opacities with plugging and nonspecific mediastinal and hilar lymphadenopathy.  No pulmonary embolism was noted.  He had a leukocytosis measured at 18.8k with a neutrophilic predominance.  Admission wasn't recommended and he was discharged home.    
  He was then seen on August 12 by Dr. Avery Aquino, ENT, after completing antibiotics with Augmentin and Cipro.  At that appointment he was started on Septra, Protonix and nystatin with Magic mouthwash for oral candidiasis and sinusitis.  He was noted to have right neck discomfort and a CT of the neck was recommended if his symptoms of right neck pain continued.     He was initially seen at [address] on August 29, when his oxygen saturation was noted to be 83% on room air. Araseli Riggins reported he had just experienced his first night without a fever since July. Araseli Riggins reported significant right sinus pain and congestion as well as a persistent cough.  Hospitalization was recommended, but the patient declined in favor of oral antibiotics and oxygen.  Levaquin and clindamycin were initiated and oxygen was ordered.  The oxygen was not delivered over the holiday weekend and the patient has had a very difficult course.  On Monday 9/2, Levaquin was changed to Vantin after sputum cultures revealed E. Coli.  Ultimately he was hospitalized from 9/4 until 9/10/2019 and underwent bronchoscopy which revealed E. coli as well as Prevotella dentalis.  He completed 14 days augmentin and 7 days of cefepime with improvement in hypoxemia and fevers. He was seen back in the office on 10/1 by Dr. Sharad Meneses and he remained hypoxic so oxygen support was continued. He continued to have right maxillary pressure with green colored drainage. He was given magic mouthwash and follow up with ENT recommended. He saw Dr. Samy Sandoval, ENT, on 9/27 and endoscopy was performed revealing rhinosinusitis. Per his note, he planned to do a culture and CT the sinuses. The CT was done at [facility] on Tuesday. He has now been admitted with a fever of 104. His WBC of 14.2 -> 9.5. His CXR shows bilateral infiltrates. He is coughing up large amounts of thin, green colored material that his wife states is draining from his sinuses. He remains NPO and is not even taking pleasure liquids anymore. He is PEG feeding dependent. Subjective:  
 
Seen by ENT. Afebrile here. On 3 lpm. 
Wife asking about ENT surgery, LT abx, chest pain. She tells me she knows he aspirates and has for years Review of Systems Constitutional: negative for fevers, chills and sweats Respiratory: positive for cough Cardiovascular: negative for chest pain, chest pressure/discomfort, irregular heart beats, near-syncope Gastrointestinal: negative for reflux symptoms, nausea, vomiting, change in bowel habits, melena, diarrhea, constipation, abdominal pain and jaundice Current Facility-Administered Medications Medication Dose Route Frequency  NUTRITIONAL SUPPORT ELECTROLYTE PRN ORDERS   Does Not Apply PRN  
 morphine injection 2 mg  2 mg IntraVENous Q4H PRN  
 sodium chloride (NS) flush 5-40 mL  5-40 mL IntraVENous Q8H  
 sodium chloride (NS) flush 5-40 mL  5-40 mL IntraVENous PRN  
 acetaminophen (TYLENOL) tablet 650 mg  650 mg Oral Q4H PRN  
  HYDROcodone-acetaminophen (NORCO) 5-325 mg per tablet 1 Tab  1 Tab Oral Q4H PRN  
 naloxone (NARCAN) injection 0.4 mg  0.4 mg IntraVENous PRN  
 bisacodyl (DULCOLAX) tablet 5 mg  5 mg Oral DAILY PRN  
 enoxaparin (LOVENOX) injection 40 mg  40 mg SubCUTAneous Q24H  
 albuterol-ipratropium (DUO-NEB) 2.5 MG-0.5 MG/3 ML  3 mL Nebulization Q6H RT  
 albuterol-ipratropium (DUO-NEB) 2.5 MG-0.5 MG/3 ML  3 mL Nebulization Q4H PRN  
 cefepime (MAXIPIME) 1 g in 0.9% sodium chloride (MBP/ADV) 50 mL  1 g IntraVENous Q8H  
 metroNIDAZOLE (FLAGYL) IVPB premix 500 mg  500 mg IntraVENous Q8H  
 thyroid (Pork) (ARMOUR) tablet 15 mg  15 mg Oral DAILY  predniSONE (DELTASONE) tablet 40 mg  40 mg Oral DAILY WITH BREAKFAST Objective:  
 
Vitals:  
 10/04/19 0234 10/04/19 0115 10/04/19 4629 10/04/19 0745 BP: 152/83 129/85 Pulse: 95 90 Resp: 19 18 Temp: 98.7 °F (37.1 °C) 99.2 °F (37.3 °C) SpO2: 92% 100%  94% Weight:   120 lb 9.6 oz (54.7 kg) Height:      
 
Intake and Output:  
No intake/output data recorded. 10/04 0701 - 10/04 1900 In: 80 Out: - Intake/Output Summary (Last 24 hours) at 10/4/2019 1126 Last data filed at 10/4/2019 6671 Gross per 24 hour Intake 90 ml Output  Net 90 ml Physical Exam:         
Constitutional: the patient is weak HEENT: Sclera clear, pupils equal, oral mucosa moist 
Lungs: scattered rhonchi, copious green sputum Cardiovascular: RRR without M,G,R 
Abd/GI: soft and non-tender; with positive bowel sounds. Ext: warm without cyanosis. There is no lower leg edema. Musculoskeletal: moves all four extremities with equal strength Skin: no jaundice or rashes, no wounds Neuro: no gross neuro deficits Lines/Drains: PIV, PEG, Trach Nutrition: NPO, PEG 
 
CHEST CT 
 
 
 
 
Culture result:    Preliminary NO GROWTH AFTER SHORT PERIOD OF INCUBATION. FURTHER RESULTS TO FOLLOW AFTER OVERNIGHT INCUBATION. Culture result: NO GROWTH 2 DAYS A. GALACTOMANNAN, AGPT 0.411  <0.500   Final  
 
AFB Specimen processing Concentration   Final  
Acid Fast Smear NEGATIVE     Final  
(NOTE) LAB Recent Labs 10/03/19 
0406 10/02/19 
1731 WBC 9.5 14.2* HGB 11.0* 12.5*  
HCT 34.8* 40.1*  307 Recent Labs 10/03/19 
0406 10/02/19 
2252 10/02/19 
1731   --  135* K 3.5  --  4.1   --  99  
CO2 26  --  30  
GLU 91  --  125* BUN 13  --  11  
CREA 0.54*  --  0.65* TROIQ <0.02* <0.02* <0.02* Assessment:  
 
Patient Active Problem List  
Diagnosis Code  Feeding difficulties and mismanagement R63.3  Acute and chronic respiratory failure with hypoxia (HCC) J96.21  
 Aspiration, chronic pulmonary T17.908A  Aspiration pneumonia ([address] J69.0  Acquired hypothyroidism E03.9  Dysphagia R13.10  
 H/O laryngeal cancer Z85.21  
 Protein calorie malnutrition (HCC) E46  
 Pulmonary infiltrates R91.8  Sinusitis J32.9 Acute and chronic respiratory failure with hypoxia ([address] (9/4/2019) On minimal O2- 3 lpm 
 
  Aspiration, chronic pulmonary (9/4/2019) With ongoing infiltrates, coughing Aspiration pneumonia ([address] (10/2/2019) Dysphagia (10/3/2019)/  H/O laryngeal cancer (10/3/2019) Protein calorie malnutrition ([address] (10/3/2019) Pulmonary infiltrates (10/3/2019) On chest CT 9/4 and 10/3 Hx aspiration and Hx laryngeal cancer Sinusitis (10/3/2019)- seen by ENT \"He had a CT of the sinuses done 2 days ago at [facility] [employer]. This showed some thickened mucosal with in the nasal cavity, consistent with the polyps seen in the office and the known history of nasal polyps. There is some mild mucosal thickening of the left maxillary sinus with previous surgical changes seen. Given his current history, this is likely a chronic inflammation (polyps, sinus mucosal thickening) that is contributing to a chronic thick mucoid drainage.   He could benefit from nasal nebulized antibiotics/steroids. I don't know if this can be provided in the in patient setting. But from the current scan, no surgical intervention is needed at this time. Will continue to monitor for now. \" 
 
 
PLAN: 
-- ? Aspiration with chronic PEG/ Hx laryngeal ca. He is NPO and PEG dependent 
-- on flagyl, cefepime -- chest CT 9/4 and now with ongoing \"Extensive tree-in-bud type opacities bilaterally with overall mild progression. \" Likely needs Bronch/BAL with r/o fungal vs atypical process. Had bronch in 9/4 positive for e. Coli. Also has a hx cancer. -- recommend bronch Surendra Page, NP-C Lungs:  Crackles bilateral 
Heart:  RRR with no Murmur/Rubs/Gallops Additional Comments:  Chest ct as above bilateral extensive tree n bud opacities- will proceed with bronch and consult id I have spoken with and examined the patient. I agree with the above assessment and plan as documented. Jerry Willard MD 
 
 
More than 50% of time documented was spent in face-to-face contact with the patient and in the care of the patient on the floor/unit where the patient is located.

## 2019-10-04 NOTE — PROCEDURES
PROCEDURE Bronchoscopy with airway inspection/cleanout/BAL. [facility] INDICATION Pneumonia EQUIPMENT: 
 
Olympus Q 180 Bronchhoscope. ANESTHESIA Concious sedation with: Fentanyl 200 mcg IV; Versed 3 mg IV; Lidocaine 80 mg to tracheo-bronchial tree and vocal cords; . AIRWAY INSPECTION After obtaining informed consent, using a bite block/ET tube adapter, an Olympus q 180 video/fiberoptic bronchoscope was  introduced into the trachea through the vocal chords/ET tube, without complication. RIGHT 
 
LOCATION NORM/ABNORM DESCRIPTION  
VOCAL CORDS NL   
TRACHEA NL   
KENYETTA NL   
RMSB NL Copious purulent secretions seen bilateral with mucus plugging cleared with  saline RUL NL   
BI NL Bal done with 120 ml saline RML RML NL   
SUP SEGM RLL NL   
MED BASAL NL   
ANTERIOR BASAL NL   
LATERAL BASAL NL   
POSTERIOR BASAL NL   
 
      
 
 
 
 
 
 
 
 
 
 
 
 
 
 
     LEFT 
 
LOCATION NORMAL/ABNORMAL TYPE  
LMSB SHWETA    
LINGULA SUPERIOR DIVISION    
SUPERIOR SEG LLL    
ABBY-MEDIAL LLL    
LATERAL LLL POSTERIOR LLL The following samples were obtained: 
 
BAL: 
Bronchial Wash: 
 
Samples were sent for: 
Cultures, cell count, cytology, aspergillus The procedure was completed  without complication and the patient tolerated the procedure well. Estimated blood loss-  0 to minimum Recommendations: Follow up results May Mcrae MD

## 2019-10-05 VITALS
BODY MASS INDEX: 17.22 KG/M2 | OXYGEN SATURATION: 94 % | TEMPERATURE: 98.4 F | HEIGHT: 71 IN | SYSTOLIC BLOOD PRESSURE: 131 MMHG | DIASTOLIC BLOOD PRESSURE: 91 MMHG | HEART RATE: 88 BPM | RESPIRATION RATE: 20 BRPM | WEIGHT: 123 LBS

## 2019-10-05 LAB
ANION GAP SERPL CALC-SCNC: 7 MMOL/L (ref 7–16)
BUN SERPL-MCNC: 11 MG/DL (ref 8–23)
CALCIUM SERPL-MCNC: 8.8 MG/DL (ref 8.3–10.4)
CHLORIDE SERPL-SCNC: 100 MMOL/L (ref 98–107)
CO2 SERPL-SCNC: 31 MMOL/L (ref 21–32)
CREAT SERPL-MCNC: 0.5 MG/DL (ref 0.8–1.5)
ERYTHROCYTE [DISTWIDTH] IN BLOOD BY AUTOMATED COUNT: 16.5 % (ref 11.9–14.6)
GLUCOSE SERPL-MCNC: 92 MG/DL (ref 65–100)
HCT VFR BLD AUTO: 37 % (ref 41.1–50.3)
HGB BLD-MCNC: 11.9 G/DL (ref 13.6–17.2)
MCH RBC QN AUTO: 29.2 PG (ref 26.1–32.9)
MCHC RBC AUTO-ENTMCNC: 32.2 G/DL (ref 31.4–35)
MCV RBC AUTO: 90.7 FL (ref 79.6–97.8)
NRBC # BLD: 0 K/UL (ref 0–0.2)
PLATELET # BLD AUTO: 293 K/UL (ref 150–450)
PMV BLD AUTO: 11.4 FL (ref 9.4–12.3)
POTASSIUM SERPL-SCNC: 3.6 MMOL/L (ref 3.5–5.1)
RBC # BLD AUTO: 4.08 M/UL (ref 4.23–5.6)
SODIUM SERPL-SCNC: 138 MMOL/L (ref 136–145)
WBC # BLD AUTO: 16.5 K/UL (ref 4.3–11.1)

## 2019-10-05 PROCEDURE — 80048 BASIC METABOLIC PNL TOTAL CA: CPT

## 2019-10-05 PROCEDURE — 85027 COMPLETE CBC AUTOMATED: CPT

## 2019-10-05 PROCEDURE — 74011636637 HC RX REV CODE- 636/637: Performed by: HOSPITALIST

## 2019-10-05 PROCEDURE — 99232 SBSQ HOSP IP/OBS MODERATE 35: CPT | Performed by: INTERNAL MEDICINE

## 2019-10-05 PROCEDURE — 74011000258 HC RX REV CODE- 258: Performed by: HOSPITALIST

## 2019-10-05 PROCEDURE — 77010033678 HC OXYGEN DAILY

## 2019-10-05 PROCEDURE — 74011250637 HC RX REV CODE- 250/637: Performed by: HOSPITALIST

## 2019-10-05 PROCEDURE — 74011250636 HC RX REV CODE- 250/636: Performed by: INTERNAL MEDICINE

## 2019-10-05 PROCEDURE — 36415 COLL VENOUS BLD VENIPUNCTURE: CPT

## 2019-10-05 PROCEDURE — 74011250637 HC RX REV CODE- 250/637: Performed by: INTERNAL MEDICINE

## 2019-10-05 PROCEDURE — 74011250636 HC RX REV CODE- 250/636: Performed by: HOSPITALIST

## 2019-10-05 PROCEDURE — 94760 N-INVAS EAR/PLS OXIMETRY 1: CPT

## 2019-10-05 RX ORDER — HYDROCODONE BITARTRATE AND ACETAMINOPHEN 5; 325 MG/1; MG/1
1 TABLET ORAL
Qty: 15 TAB | Refills: 0 | Status: SHIPPED | OUTPATIENT
Start: 2019-10-05 | End: 2019-10-08

## 2019-10-05 RX ORDER — AMOXICILLIN AND CLAVULANATE POTASSIUM 875; 125 MG/1; MG/1
1 TABLET, FILM COATED ORAL 2 TIMES DAILY WITH MEALS
Qty: 12 TAB | Refills: 0 | Status: SHIPPED | OUTPATIENT
Start: 2019-10-05 | End: 2019-10-11

## 2019-10-05 RX ORDER — PREDNISONE 10 MG/1
10 TABLET ORAL SEE ADMIN INSTRUCTIONS
Qty: 21 TAB | Refills: 0 | Status: SHIPPED | OUTPATIENT
Start: 2019-10-05 | End: 2020-01-03 | Stop reason: CLARIF

## 2019-10-05 RX ORDER — IPRATROPIUM BROMIDE AND ALBUTEROL SULFATE 2.5; .5 MG/3ML; MG/3ML
3 SOLUTION RESPIRATORY (INHALATION)
Qty: 90 NEBULE | Refills: 0 | Status: SHIPPED | OUTPATIENT
Start: 2019-10-05 | End: 2019-11-04

## 2019-10-05 RX ORDER — AMOXICILLIN AND CLAVULANATE POTASSIUM 875; 125 MG/1; MG/1
1 TABLET, FILM COATED ORAL 2 TIMES DAILY WITH MEALS
Status: DISCONTINUED | OUTPATIENT
Start: 2019-10-05 | End: 2019-10-05 | Stop reason: HOSPADM

## 2019-10-05 RX ADMIN — Medication 10 ML: at 05:42

## 2019-10-05 RX ADMIN — METRONIDAZOLE 500 MG: 500 INJECTION, SOLUTION INTRAVENOUS at 05:41

## 2019-10-05 RX ADMIN — LEVOTHYROXINE, LIOTHYRONINE 15 MG: 38; 9 TABLET ORAL at 09:39

## 2019-10-05 RX ADMIN — MORPHINE SULFATE 2 MG: 2 INJECTION, SOLUTION INTRAMUSCULAR; INTRAVENOUS at 07:49

## 2019-10-05 RX ADMIN — CEFEPIME HYDROCHLORIDE 1 G: 1 INJECTION, POWDER, FOR SOLUTION INTRAMUSCULAR; INTRAVENOUS at 02:14

## 2019-10-05 RX ADMIN — PREDNISONE 40 MG: 10 TABLET ORAL at 09:41

## 2019-10-05 RX ADMIN — MORPHINE SULFATE 2 MG: 2 INJECTION, SOLUTION INTRAMUSCULAR; INTRAVENOUS at 02:24

## 2019-10-05 RX ADMIN — AMOXICILLIN AND CLAVULANATE POTASSIUM 1 TABLET: 875; 125 TABLET, FILM COATED ORAL at 09:39

## 2019-10-05 NOTE — PROGRESS NOTES
Truman Velazquez Admission Date: 10/2/2019 Daily Progress Note: 10/5/2019 The patient's chart is reviewed and the patient is discussed with the staff. 
 
60 y.o.  male seen and evaluated at the request of Dr. Melo Gardner is a 56 y.o. 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  He underwent a CT of the chest on 7/6 which revealed diffuse lower lobe predominant nodular opacities with plugging and nonspecific mediastinal and hilar lymphadenopathy.  No pulmonary embolism was noted.  He had a leukocytosis measured at 18.8k with a neutrophilic predominance.  Admission wasn't recommended and he was discharged home. Grace Romero was then seen on August 12 by Dr. Luis Manuel Todd, ENT, after completing antibiotics with Augmentin and Cipro.  At that appointment he was started on Septra, Protonix and nystatin with Magic mouthwash for oral candidiasis and sinusitis.  He was noted to have right neck discomfort and a CT of the neck was recommended if his symptoms of right neck pain continued.  
   He was initially seen at [facility] on August 29, when his oxygen saturation was noted to be 83% on room air. Lisa Pittman reported he had just experienced his first night without a fever since July. Lisa Pittman reported significant right sinus pain and congestion as well as a persistent cough.  Hospitalization was recommended, but the patient declined in favor of oral antibiotics and oxygen.  Levaquin and clindamycin were initiated and oxygen was ordered.  The oxygen was not delivered over the holiday weekend and the patient has had a very difficult course.  On Monday 9/2, Levaquin was changed to [facility] after sputum cultures revealed E. Coli.  Ultimately he was hospitalized from 9/4 until 9/10/2019 and underwent bronchoscopy which revealed E. coli as well as Prevotella dentalis.  He completed 14 days augmentin and 7 days of cefepime with improvement in hypoxemia and fevers.  
  He was seen back in the office on 10/1 by Dr. Kirstin Benavidez and he remained hypoxic so oxygen support was continued. Shelton Lux continued to have right maxillary pressure with green colored drainage. He was given magic mouthwash and follow up with ENT recommended. He saw Dr. David Do, ENT, on 9/27 and endoscopy was performed revealing rhinosinusitis. Per his note, he planned to do a culture and CT the sinuses. The CT was done at [address] on Tuesday.  
[facility] has now been admitted with a fever of 104. His WBC of 14.2 -> 9.5. His CXR shows bilateral infiltrates. He is coughing up large amounts of thin, green colored material that his wife states is draining from his sinuses. He remains NPO and is not even taking pleasure liquids anymore. He is PEG feeding dependent. Subjective:  
Bronch yesterday with copious purulent secretions removed. He is not much better today -bal does have predominant pmns but nothing back on cultures He is having chest pain- getting morphine Current Facility-Administered Medications Medication Dose Route Frequency  sodium chloride (NS) flush 5-40 mL  5-40 mL IntraVENous Q8H  
 sodium chloride (NS) flush 5-40 mL  5-40 mL IntraVENous PRN  
 NUTRITIONAL SUPPORT ELECTROLYTE PRN ORDERS   Does Not Apply PRN  
 morphine injection 2 mg  2 mg IntraVENous Q4H PRN  
 sodium chloride (NS) flush 5-40 mL  5-40 mL IntraVENous Q8H  
 sodium chloride (NS) flush 5-40 mL  5-40 mL IntraVENous PRN  
 acetaminophen (TYLENOL) tablet 650 mg  650 mg Oral Q4H PRN  
 HYDROcodone-acetaminophen (NORCO) 5-325 mg per tablet 1 Tab  1 Tab Oral Q4H PRN  
 naloxone (NARCAN) injection 0.4 mg  0.4 mg IntraVENous PRN  
  bisacodyl (DULCOLAX) tablet 5 mg  5 mg Oral DAILY PRN  
 enoxaparin (LOVENOX) injection 40 mg  40 mg SubCUTAneous Q24H  
 albuterol-ipratropium (DUO-NEB) 2.5 MG-0.5 MG/3 ML  3 mL Nebulization Q6H RT  
 albuterol-ipratropium (DUO-NEB) 2.5 MG-0.5 MG/3 ML  3 mL Nebulization Q4H PRN  
 cefepime (MAXIPIME) 1 g in 0.9% sodium chloride (MBP/ADV) 50 mL  1 g IntraVENous Q8H  
 metroNIDAZOLE (FLAGYL) IVPB premix 500 mg  500 mg IntraVENous Q8H  
 thyroid (Pork) (ARMOUR) tablet 15 mg  15 mg Oral DAILY  predniSONE (DELTASONE) tablet 40 mg  40 mg Oral DAILY WITH BREAKFAST Review of Systems Constitutional: negative for fever, chills, sweats Cardiovascular: negative for chest pain, palpitations, syncope, edema Gastrointestinal:  negative for dysphagia, reflux, vomiting, diarrhea, abdominal pain, or melena Neurologic:  negative for focal weakness, numbness, headache Objective:  
 
Vitals:  
 10/04/19 1957 10/04/19 2232 10/05/19 0606 10/05/19 0740 BP: 147/82 Pulse: 91     
Resp: 18 Temp: 98 °F (36.7 °C) SpO2: 97% 95%  94% Weight:   123 lb (55.8 kg) Height:      
 
 
 
Intake/Output Summary (Last 24 hours) at 10/5/2019 0815 Last data filed at 10/4/2019 1637 Gross per 24 hour Intake 1860 ml Output  Net 1860 ml Physical Exam:  
Constitution:  the patient is well developed and in no acute distress EENMT:  Sclera clear, pupils equal, oral mucosa moist 
Respiratory:  Bilateral crackles and rhonchi 
Cardiovascular:  RRR without M,G,R 
Gastrointestinal: soft and non-tender; with positive bowel sounds. Musculoskeletal: warm without cyanosis. There is no lower extremity edema. Skin:  no jaundice or rashes, no wounds Neurologic: no gross neuro deficits Psychiatric:  alert and oriented x 3 CXR:  
 
 
LAB No results for input(s): GLUCPOC in the last 72 hours. No lab exists for component: [place] Recent Labs 10/05/19 
2821 10/03/19 
0406 10/02/19 
1733 WBC 16.5* 9.5 14.2* HGB 11.9* 11.0* 12.5*  
HCT 37.0* 34.8* 40.1*  267 307 Recent Labs 10/05/19 
5237 10/03/19 
0406 10/02/19 
2252 10/02/19 
1731  138  --  135* K 3.6 3.5  --  4.1  104  --  99  
CO2 31 26  --  30  
GLU 92 91  --  125* BUN 11 13  --  11  
CREA 0.50* 0.54*  --  0.65* CA 8.8 8.7  --  9.1 TROIQ  --  <0.02* <0.02* <0.02* ALB  --   --   --  3.1* TBILI  --   --   --  0.5 ALT  --   --   --  11* SGOT  --   --   --  8* Recent Labs 10/02/19 
1952 PHI 7.386 PCO2I 44.0 PO2I 48* HCO3I 26.4* No results for input(s): LCAD, LAC in the last 72 hours. Assessment:  (Medical Decision Making) Hospital Problems  Date Reviewed: 10/1/2019 Codes Class Noted POA Acquired hypothyroidism (Chronic) ICD-10-CM: E03.9 ICD-9-CM: 244.9  10/3/2019 Yes Dysphagia (Chronic) ICD-10-CM: R13.10 ICD-9-CM: 787.20  10/3/2019 Yes  
   
 H/O laryngeal cancer (Chronic) ICD-10-CM: C32.63 
ICD-9-CM: V10.21  10/3/2019 Yes Protein calorie malnutrition ([facility] ICD-10-CM: E46 
ICD-9-CM: 263.9  10/3/2019 Yes Pulmonary infiltrates ICD-10-CM: R91.8 ICD-9-CM: 793.19  10/3/2019 Unknown Sinusitis ICD-10-CM: J32.9 ICD-9-CM: 473.9  10/3/2019 Unknown Aspiration pneumonia ([facility] ICD-10-CM: J69.0 ICD-9-CM: 507.0  10/2/2019 Yes * (Principal) Acute and chronic respiratory failure with hypoxia (HCC) ICD-10-CM: J96.21 
ICD-9-CM: 518.84, 799.02  9/4/2019 Yes Aspiration, chronic pulmonary (Chronic) ICD-10-CM: U85.859T ICD-9-CM: 934.9  9/4/2019 Yes Plan:  (Medical Decision Making) 1   Change antibx to augmentin as per id 2    Can go home on augmentin 3   Add prednisone for now 4   Will follow up in our office in 2 weeks and to see id in 4 weeks -- 
 
More than 50% of the time documented was spent in face-to-face contact with the patient and in the care of the patient on the floor/unit where the patient is located.  
 
Lc Tadeo MD

## 2019-10-05 NOTE — PROGRESS NOTES
Morphine 2mg inj given IVP for c/o 10/10 constant back pain. Will continue to monitor. Primary RN aware

## 2019-10-05 NOTE — DISCHARGE SUMMARY
Hospitalist Discharge Summary Patient ID: 
Adeel Mayes 
[id number] 
50 y.o. 
1960 Admit date: 10/2/2019  4:57 PM 
Discharge date and time: 10/5/2019 Attending: Jessica Boyd MD 
PCP:  Prosper Guzman MD 
Treatment Team: Attending Provider: Jessica Boyd MD; Consulting Provider: Emily Wright MD; Consulting Provider: Randall Liang DO; Care Manager: Addi Pacheco; Utilization Review: Kirsty Medrano RN; Consulting Provider: Manav Holden MD 
 
Principal Diagnosis Acute and chronic respiratory failure with hypoxia [facility]) Principal Problem: 
  Acute and chronic respiratory failure with hypoxia ([facility] (9/4/2019) Active Problems: 
  Aspiration, chronic pulmonary (9/4/2019) Aspiration pneumonia ([address] (10/2/2019) Acquired hypothyroidism (10/3/2019) Dysphagia (10/3/2019) H/O laryngeal cancer (10/3/2019) Protein calorie malnutrition ([address] (10/3/2019) Pulmonary infiltrates (10/3/2019) Sinusitis (10/3/2019) Hospital Course: 
Please refer to the admission H&P for details of presentation. In summary, the patient is 79-year-old male with past medical history significant for squamous cell carcinoma of the hypopharynx status post chemotherapy and radiation therapy 2005 (PEG tube with multiple EGDs with dilations) bilateral nasal polyps was recently discharged from the facility about a month ago after being  treated for aspiration pneumonia. Patient states that last night he had fever with a maximum of 104.1 degree for a night associated with chills. He has cough but unable to produce any phlegm. Patient also has greenish discharge from the right nostril. He has seen Dr. Maria M Lord in the office yesterday and was advised to go to the ER to get admitted to the hospital.  Patient reports chest pain worse with coughing.   At baseline patient is on 2 L oxygen by nasal cannula as needed during the day and at night.  No palpitations no nausea no vomiting,. reports constipation no diarrhea. He is supposed to be strictly n.p.o. he had a PEG tube placed and is currently on tube feeds at home. Patient initially admitted to the medical floor started on broad-spectrum IV antibiotics, we consulted pulmonary who followed along with us  during the hospitalization patient undergone bronchoscopy, bronchoscopy culture results are still pending, in view of his recurrent infections we also consulted infectious disease. ID recommended patient can go home with total 7 days of p.o. antibiotics, they are going to follow the patient as an outpatient. Infectious disease also ordered work-up for fungal and mycobacterium the results are still pending but ID is going to follow the as an outpatient. In view of his significant nasal drainage we consulted ENT, as per them there is no surgical intervention at this time they are going to follow him as an outpatient. On the day of discharge patient is hemodynamically stable cleared by pulmonary and ID discharged home and advised to follow-up with PCP pulmonology and infectious disease as an outpatient Significant Diagnostic Studies:  
Ct chest 
IMPRESSION IMPRESSION: 
  
1. Extensive diffuse tiny centrilobular nodules (tree-in-bud configuration). Additionally there are filling defects within the lower lobe bronchi, causing 
near obstruction. Diagnostic considerations includes infectious (including 
fungal and tuberculosis etiology) and inflammatory bronchiolitis. Airway spread 
of bronchoalveolar carcinoma should also be considered. 
  
2. Stable coarse calcifications in the lower lobes, nonspecific but may be 
secondary to prior granuloma disease. 
  
3. No focal pulmonary consolidation. Labs: Results:  
   
Chemistry Recent Labs 10/05/19 
1701 10/03/19 
0406 10/02/19 
1731 GLU 92 91 125*  138 135* K 3.6 3.5 4.1  104 99 CO2 31 26 30 BUN 11 13 11  
 CREA 0.50* 0.54* 0.65* CA 8.8 8.7 9.1 AGAP 7 8 6* AP  --   --  69  
TP  --   --  7.7 ALB  --   --  3.1*  
GLOB  --   --  4.6* AGRAT  --   --  0.7* CBC w/Diff Recent Labs 10/05/19 
5392 10/03/19 
0406 10/02/19 
1731 WBC 16.5* 9.5 14.2*  
RBC 4.08* 3.78* 4.34  
HGB 11.9* 11.0* 12.5*  
HCT 37.0* 34.8* 40.1*  267 307 GRANS  --  76 93* LYMPH  --  16 5* EOS  --  1 0* Cardiac Enzymes No results for input(s): CPK, CKND1, ALEXEI in the last 72 hours. No lab exists for component: Rolena Caprice Coagulation No results for input(s): PTP, INR, APTT, INREXT in the last 72 hours. Lipid Panel No results found for: CHOL, CHOLPOCT, CHOLX, CHLST, CHOLV, 099181, HDL, HDLP, LDL, LDLC, DLDLP, 663063, VLDLC, VLDL, TGLX, TRIGL, TRIGP, TGLPOCT, CHHD, CHHDX  
BNP No results for input(s): BNPP in the last 72 hours. Liver Enzymes Recent Labs 10/02/19 
1731 TP 7.7 ALB 3.1* AP 69 SGOT 8* Thyroid Studies Lab Results Component Value Date/Time T4, Total 8.4 11/14/2018 03:26 PM  
 T3 Uptake 29 11/14/2018 03:26 PM  
 TSH 6.630 (H) 11/14/2018 03:26 PM  
    
 
 
Discharge Exam: 
Visit Virgil Schirmer /81 (BP 1 Location: Right arm, BP Patient Position: At rest;Sitting) Pulse 97 Temp 98 °F (36.7 °C) Resp 20 Ht 5' 11\" (1.803 m) Wt 55.8 kg (123 lb) SpO2 (!) 89% Comment: RN in the room when v/s taken BMI 17.16 kg/m² General appearance: alert, cooperative, no distress, appears stated age Lungs: clear to auscultation bilaterally Heart: regular rate and rhythm, S1, S2 normal, no murmur, click, rub or gallop Abdomen: soft, non-tender. Bowel sounds normal. No masses,  no organomegaly Extremities: no cyanosis or edema Neurologic: Grossly normal 
 
Disposition: home Discharge Condition: stable Patient Instructions:  
Current Discharge Medication List  
  
START taking these medications Details HYDROcodone-acetaminophen (NORCO) 5-325 mg per tablet Take 1 Tab by mouth every eight (8) hours as needed for Pain for up to 3 days. Max Daily Amount: 3 Tabs. Qty: 15 Tab, Refills: 0 Associated Diagnoses: Pneumonia of right lower lobe due to infectious organism ([address] predniSONE (STERAPRED DS) 10 mg dose pack Take 1 Tab by mouth See Admin Instructions. See administration instruction per 10mg dose pack Qty: 21 Tab, Refills: 0 CONTINUE these medications which have CHANGED Details  
amoxicillin-clavulanate (AUGMENTIN) 875-125 mg per tablet Take 1 Tab by mouth two (2) times daily (with meals) for 6 days. Qty: 12 Tab, Refills: 0 CONTINUE these medications which have NOT CHANGED Details  
nystatin (MYCOSTATIN) 100,000 unit/mL suspension Take 5 mL by mouth four (4) times daily. swish and spit Qty: 100 mL, Refills: 3  
  
magic mouthwash solution Magic Mouthwash wo Xylocaine Hydrocortisone powder 15 mg Nystatin 15 cc Benadryl Elixir 30 cc Tetracycline 500mg QS to 120 cc with water 1 teaspoon swish and spit out after meals and at bedtime 
Qty: 200 mL, Refills: 2 Associated Diagnoses: Oral mucositis  
  
thyroid, Pork, (ARMOUR THYROID) 15 mg tablet 1 q daily 
Qty: 90 Tab, Refills: 3 STOP taking these medications  
  
 predniSONE (DELTASONE) 10 mg tablet Comments:  
Reason for Stopping:   
   
  
 
 
Activity: Activity as tolerated Diet: PEG feeding at as directed Wound Care: As directed Follow-up · Time spent to discharge patient 35 minutes Signed: 
Talha Bryant MD 
10/5/2019 
9:28 AM

## 2019-10-05 NOTE — DISCHARGE INSTRUCTIONS
Patient Education        Aspiration Pneumonia: Care Instructions  Your Care Instructions    Aspiration pneumonia is an inflammation of the lungs. It may occur after you breathe in large amounts of foreign material, such as food, liquid, vomit, or mucus. Aspiration may happen because of a health problem that makes it hard to swallow. These problems include stroke or seizure. Pneumonia makes it hard to breathe. Follow-up care is a key part of your treatment and safety. Be sure to make and go to all appointments, and call your doctor if you are having problems. It's also a good idea to know your test results and keep a list of the medicines you take. How can you care for yourself at home? To help with swallowing  · You may need to do exercises to train your muscles to work together to help you swallow. You may also need to learn how to position your body or how to put food in your mouth to be able to swallow better. · You may need to change the foods you eat. Your doctor may tell you to eat certain foods and liquids to make swallowing easier. · You may need to change how you prepare foods. For example, you may need to add thickeners to some liquids, or puree certain foods in a . To help with pneumonia  · Take your antibiotics as directed. Do not stop taking them just because you feel better. You need to take the full course of antibiotics. · Take your medicines exactly as prescribed. For example, your doctor may have given you medicine that makes breathing easier. Call your doctor if you think you are having a problem with your medicine. · Get plenty of rest and sleep. You may feel weak and tired for a while, but your energy level will improve with time. · Take care of your cough so you can rest. A cough that brings up mucus from your lungs is common with pneumonia. It is one way your body gets rid of the infection.  But if coughing keeps you from resting or causes severe fatigue and chest-wall pain, talk to your doctor. He or she may suggest that you take a medicine to reduce the cough. · Use a humidifier to increase the moisture in the air. Dry air makes coughing worse. Follow the instructions for cleaning the machine. · Do not smoke, and avoid others' smoke. Smoke will make your cough last longer. If you need help quitting, talk to your doctor about stop-smoking programs and medicines. These can increase your chances of quitting for good. · Take an over-the-counter pain medicine, such as acetaminophen (Tylenol), ibuprofen (Advil, Motrin), or naproxen (Aleve) to help reduce fever and reduce chest pain caused by coughing. Read and follow all instructions on the label. · Do not take two or more pain medicines at the same time unless the doctor told you to. Many pain medicines have acetaminophen, which is Tylenol. Too much acetaminophen (Tylenol) can be harmful. When should you call for help? Call 911 anytime you think you may need emergency care. For example, call if:    · You have severe trouble breathing.    Call your doctor now or seek immediate medical care if:    · You have a new or higher fever.     · You have new or worse trouble breathing.     · You cough up blood.     · You are dizzy or lightheaded, or you feel like you may faint.    Watch closely for changes in your health, and be sure to contact your doctor if:    · You do not get better as expected.     · You are coughing more deeply or more often. Where can you learn more? Go to http://beckie-isaias.info/. Enter [phone] [id number] [phone] in the search box to learn more about \"Aspiration Pneumonia: Care Instructions. \"  Current as of: June 9, 2019  Content Version: 12.2  © 6696-3811 [employer]. Care instructions adapted under license by [employer] (which disclaims liability or warranty for this information).  If you have questions about a medical condition or this instruction, always ask your healthcare professional. [employer] disclaims any warranty or liability for your use of this information. DISCHARGE SUMMARY from Nurse    PATIENT INSTRUCTIONS:    After general anesthesia or intravenous sedation, for 24 hours or while taking prescription Narcotics:  · Limit your activities  · Do not drive and operate hazardous machinery  · Do not make important personal or business decisions  · Do  not drink alcoholic beverages  · If you have not urinated within 8 hours after discharge, please contact your surgeon on call. Report the following to your surgeon:  · Excessive pain, swelling, redness or odor of or around the surgical area  · Temperature over 100.5  · Nausea and vomiting lasting longer than 4 hours or if unable to take medications  · Any signs of decreased circulation or nerve impairment to extremity: change in color, persistent  numbness, tingling, coldness or increase pain  · Any questions    What to do at Home:  Recommended activity: Activity as tolerated,     If you experience any of the following symptoms fever greater then 100.5, pain unrelieved by medication, increase in shortness of breath, please follow up with primary care doctor. *  Please give a list of your current medications to your Primary Care Provider. *  Please update this list whenever your medications are discontinued, doses are      changed, or new medications (including over-the-counter products) are added. *  Please carry medication information at all times in case of emergency situations. These are general instructions for a healthy lifestyle:    No smoking/ No tobacco products/ Avoid exposure to second hand smoke  Surgeon General's Warning:  Quitting smoking now greatly reduces serious risk to your health.     Obesity, smoking, and sedentary lifestyle greatly increases your risk for illness    A healthy diet, regular physical exercise & weight monitoring are important for maintaining a healthy lifestyle    You may be retaining fluid if you have a history of heart failure or if you experience any of the following symptoms:  Weight gain of 3 pounds or more overnight or 5 pounds in a week, increased swelling in our hands or feet or shortness of breath while lying flat in bed. Please call your doctor as soon as you notice any of these symptoms; do not wait until your next office visit. The discharge information has been reviewed with the patient. The patient verbalized understanding. Discharge medications reviewed with the patient and appropriate educational materials and side effects teaching were provided.   ___________________________________________________________________________________________________________________________________

## 2019-10-05 NOTE — PROGRESS NOTES
End of shift report given to [facility] , pt is stable  resting in bed , call light within reach, bed locked and low position, pt respirations even and unlabored, no distress noted.

## 2019-10-05 NOTE — PROGRESS NOTES
Visit by spiritual volunteer.  
 
Mervin Olivares, staff [address], [id number] [facility] Justin  /   [email or web address]

## 2019-10-06 NOTE — PROGRESS NOTES
Pt was discharged home with spouse yesterday. [facility]. Pt/Spouse denied any additional needs at time of discharge. Care Management Interventions PCP Verified by CM: Yes(Ana Dominguez MD) Mode of Transport at Discharge: Other (see comment)(Family) Transition of Care Consult (CM Consult): Discharge Planning Discharge Durable Medical Equipment: No 
Physical Therapy Consult: Yes Occupational Therapy Consult: Yes Speech Therapy Consult: No 
Current Support Network: Own Home, Lives with Spouse Confirm Follow Up Transport: Family Plan discussed with Pt/Family/Caregiver: Yes Freedom of Choice Offered: Yes [employer] Information Provided?: No 
Discharge Location Discharge Placement: Home

## 2019-10-07 LAB
BACTERIA SPEC CULT: NORMAL
BACTERIA SPEC CULT: NORMAL
Lab: NORMAL
REFERENCE LAB,REFLB: NORMAL
SERVICE CMNT-IMP: NORMAL
SERVICE CMNT-IMP: NORMAL
TEST DESCRIPTION:,ATST: NORMAL

## 2019-10-08 LAB
A FUMIGATUS DNA SPEC QL NAA+PROBE: NOT DETECTED
A TERREUS DNA SPEC QL NAA+PROBE: NOT DETECTED
ASPERGILLUS DNA SPEC QL NAA+PROBE: NOT DETECTED
C. PNEUMONAIE, CPPT: NOT DETECTED
L PNEUMO DNA SPEC QL NAA+PROBE: NOT DETECTED
M. PNEUMONIAE, MPPT: NOT DETECTED
PAN LEGIONELLA: NOT DETECTED

## 2019-10-09 LAB
BACTERIA SPEC CULT: ABNORMAL
GALACTOMANNAN AG SERPL IA-ACNC: 4.19
GRAM STN SPEC: ABNORMAL
SERVICE CMNT-IMP: ABNORMAL
SERVICE CMNT-IMP: ABNORMAL

## 2019-10-10 LAB
FLUAV RNA SPEC QL NAA+PROBE: NEGATIVE
FLUBV RNA SPEC QL NAA+PROBE: NEGATIVE
HADV DNA SPEC QL NAA+PROBE: NEGATIVE
HMPV RNA SPEC QL NAA+PROBE: NEGATIVE
HPIV1 RNA SPEC QL NAA+PROBE: NEGATIVE
HPIV2 RNA SPEC QL NAA+PROBE: NEGATIVE
HPIV3 RNA SPEC QL NAA+PROBE: NEGATIVE
RHINOVIRUS RNA SPEC QL NAA+PROBE: NEGATIVE
RSV A RNA SPEC QL NAA+PROBE: NEGATIVE
RSV B RNA SPEC QL NAA+PROBE: NEGATIVE
SPECIMEN SOURCE: NORMAL

## 2019-10-17 LAB
FUNGUS ID 1, (DID), RFIM1T: NORMAL
SPECIMEN SOURCE: NORMAL

## 2019-10-28 LAB
ACID FAST STN SPEC: NEGATIVE
MYCOBACTERIUM SPEC QL CULT: NEGATIVE
SPECIMEN PREPARATION: NORMAL
SPECIMEN SOURCE: NORMAL

## 2019-10-31 LAB
FUNGUS ID 1, (DID), RFIM1T: NORMAL
SPECIMEN SOURCE: NORMAL

## 2019-11-18 LAB
ACID FAST STN SPEC: NEGATIVE
ACID FAST STN SPEC: NEGATIVE
MYCOBACTERIUM SPEC QL CULT: NEGATIVE
MYCOBACTERIUM SPEC QL CULT: NEGATIVE
SPECIMEN PREPARATION: NORMAL
SPECIMEN PREPARATION: NORMAL
SPECIMEN SOURCE: NORMAL
SPECIMEN SOURCE: NORMAL

## 2019-12-05 ENCOUNTER — HOSPITAL ENCOUNTER (OUTPATIENT)
Dept: GENERAL RADIOLOGY | Age: 59
Discharge: HOME | End: 2019-12-05
Attending: INTERNAL MEDICINE
Payer: COMMERCIAL

## 2019-12-05 DIAGNOSIS — J18.9 PNEUMONIA DUE TO INFECTIOUS ORGANISM, UNSPECIFIED LATERALITY, UNSPECIFIED PART OF LUNG: ICD-10-CM

## 2019-12-05 PROCEDURE — 71046 X-RAY EXAM CHEST 2 VIEWS: CPT

## 2020-01-07 ENCOUNTER — ANESTHESIA (OUTPATIENT)
Dept: ENDOSCOPY | Age: 60
End: 2020-01-07
Payer: COMMERCIAL

## 2020-01-07 ENCOUNTER — ANESTHESIA EVENT (OUTPATIENT)
Dept: ENDOSCOPY | Age: 60
End: 2020-01-07
Payer: COMMERCIAL

## 2020-01-07 ENCOUNTER — HOSPITAL ENCOUNTER (OUTPATIENT)
Age: 60
Setting detail: OUTPATIENT SURGERY
Discharge: HOME | End: 2020-01-07
Attending: INTERNAL MEDICINE | Admitting: INTERNAL MEDICINE
Payer: COMMERCIAL

## 2020-01-07 VITALS
TEMPERATURE: 98.6 F | SYSTOLIC BLOOD PRESSURE: 157 MMHG | OXYGEN SATURATION: 96 % | RESPIRATION RATE: 16 BRPM | DIASTOLIC BLOOD PRESSURE: 104 MMHG | HEART RATE: 83 BPM

## 2020-01-07 PROCEDURE — 76060000032 HC ANESTHESIA 0.5 TO 1 HR: Performed by: INTERNAL MEDICINE

## 2020-01-07 PROCEDURE — 77030021593 HC FCPS BIOP ENDOSC BSC -A: Performed by: INTERNAL MEDICINE

## 2020-01-07 PROCEDURE — 74011250636 HC RX REV CODE- 250/636: Performed by: INTERNAL MEDICINE

## 2020-01-07 PROCEDURE — 74011250636 HC RX REV CODE- 250/636: Performed by: NURSE ANESTHETIST, CERTIFIED REGISTERED

## 2020-01-07 PROCEDURE — 74011000250 HC RX REV CODE- 250: Performed by: NURSE ANESTHETIST, CERTIFIED REGISTERED

## 2020-01-07 PROCEDURE — 88305 TISSUE EXAM BY PATHOLOGIST: CPT

## 2020-01-07 PROCEDURE — 88312 SPECIAL STAINS GROUP 1: CPT

## 2020-01-07 PROCEDURE — 76040000025: Performed by: INTERNAL MEDICINE

## 2020-01-07 RX ORDER — SODIUM CHLORIDE, SODIUM LACTATE, POTASSIUM CHLORIDE, CALCIUM CHLORIDE 600; 310; 30; 20 MG/100ML; MG/100ML; MG/100ML; MG/100ML
1000 INJECTION, SOLUTION INTRAVENOUS CONTINUOUS
Status: DISCONTINUED | OUTPATIENT
Start: 2020-01-07 | End: 2020-01-07 | Stop reason: HOSPADM

## 2020-01-07 RX ORDER — PROPOFOL 10 MG/ML
INJECTION, EMULSION INTRAVENOUS
Status: DISCONTINUED | OUTPATIENT
Start: 2020-01-07 | End: 2020-01-07 | Stop reason: HOSPADM

## 2020-01-07 RX ORDER — PROPOFOL 10 MG/ML
INJECTION, EMULSION INTRAVENOUS AS NEEDED
Status: DISCONTINUED | OUTPATIENT
Start: 2020-01-07 | End: 2020-01-07 | Stop reason: HOSPADM

## 2020-01-07 RX ORDER — LIDOCAINE HYDROCHLORIDE 20 MG/ML
INJECTION, SOLUTION EPIDURAL; INFILTRATION; INTRACAUDAL; PERINEURAL AS NEEDED
Status: DISCONTINUED | OUTPATIENT
Start: 2020-01-07 | End: 2020-01-07 | Stop reason: HOSPADM

## 2020-01-07 RX ADMIN — LIDOCAINE HYDROCHLORIDE 40 MG: 20 INJECTION, SOLUTION EPIDURAL; INFILTRATION; INTRACAUDAL; PERINEURAL at 14:25

## 2020-01-07 RX ADMIN — SODIUM CHLORIDE, SODIUM LACTATE, POTASSIUM CHLORIDE, AND CALCIUM CHLORIDE 1000 ML: 600; 310; 30; 20 INJECTION, SOLUTION INTRAVENOUS at 10:41

## 2020-01-07 RX ADMIN — PHENYLEPHRINE HYDROCHLORIDE 150 MCG: 10 INJECTION INTRAVENOUS at 14:34

## 2020-01-07 RX ADMIN — PROPOFOL 100 MCG/KG/MIN: 10 INJECTION, EMULSION INTRAVENOUS at 14:25

## 2020-01-07 RX ADMIN — PROPOFOL 50 MG: 10 INJECTION, EMULSION INTRAVENOUS at 14:25

## 2020-01-07 RX ADMIN — PHENYLEPHRINE HYDROCHLORIDE 150 MCG: 10 INJECTION INTRAVENOUS at 14:44

## 2020-01-07 NOTE — ANESTHESIA POSTPROCEDURE EVALUATION
Procedure(s):  ESOPHAGOGASTRODUODENOSCOPY (EGD) GUIDE  ESOPHAGEAL DILATION  ESOPHAGOGASTRODUODENAL (EGD) BIOPSY. total IV anesthesia    Anesthesia Post Evaluation        Patient location during evaluation: PACU  Patient participation: complete - patient participated  Level of consciousness: awake and alert  Pain management: adequate  Airway patency: patent  Anesthetic complications: no  Cardiovascular status: acceptable  Respiratory status: acceptable  Hydration status: acceptable  Post anesthesia nausea and vomiting:  none      Vitals Value Taken Time   /91 1/7/2020  2:51 PM   Temp 37 °C (98.6 °F) 1/7/2020  2:51 PM   Pulse 85 1/7/2020  2:58 PM   Resp 14 1/7/2020  2:51 PM   SpO2 95 % 1/7/2020  2:58 PM   Vitals shown include unvalidated device data.

## 2020-01-07 NOTE — PROGRESS NOTES
Pt and wife informed and updated multiple times in prep that procedure was delayed due to emergency inpatient cases.

## 2020-01-07 NOTE — ROUTINE PROCESS
Pt denies any needs. VSS. Discharge instructions given to patient and family. Pt wheeled out to car via wheelchair with staff.

## 2020-01-07 NOTE — DISCHARGE INSTRUCTIONS
Gastrointestinal Esophagogastroduodenoscopy (EGD) - Upper Exam Discharge Instructions    1. Call Dr. Chris Carter at [phone] for any problems or questions. 2. Contact the doctor's office for follow up appointment as directed. 3. Medication may cause drowsiness for several hours, therefore:  · Do not drive or operate machinery for remainder of the day. · No alcohol today. · Do not make any important or legal decisions for 24 hours. · Do not sign any legal documents for 24 hours. 5. Ordinarily, you may resume regular diet and activity after exam unless otherwise              specified by your physician. 6. For mild soreness in your throat you may use Cepacol throat lozenges or warm               salt-water gargles as needed. Any additional instructions: Follow up with GI doctor in 4-6 weeks. Instructions given to Eldon Funes and other family members.

## 2020-01-07 NOTE — ANESTHESIA PREPROCEDURE EVALUATION
Relevant Problems   No relevant active problems       Anesthetic History               Review of Systems / Medical History  Patient summary reviewed    Pulmonary          Pneumonia (9/2019) and smoker (Former)         Neuro/Psych              Cardiovascular                  Exercise tolerance: >4 METS     GI/Hepatic/Renal               Comments: Esophageal stricture Endo/Other      Hypothyroidism  Cancer (Throat)     Other Findings   Comments: Chronic Aspiration         Physical Exam    Airway  Mallampati: II    Neck ROM: decreased range of motion   Mouth opening: Diminished (comment)     Cardiovascular  Regular rate and rhythm,  S1 and S2 normal,  no murmur, click, rub, or gallop             Dental         Pulmonary  Breath sounds clear to auscultation               Abdominal         Other Findings            Anesthetic Plan    ASA: 3  Anesthesia type: total IV anesthesia            Anesthetic plan and risks discussed with: Patient

## 2020-01-07 NOTE — H&P
[facility] H&P (Admission)        Date:  1/7/2020    Chief Complaint:  Dysphagia, PEG change    Subjective:     History of Present Illness:  Patient is a 60 y.o. being admitted for EGD. PMH:  Past Medical History:   Diagnosis Date    Acute maxillary sinusitis 9/4/2019    Acute respiratory failure ([facility] [address] 10/2019    Chronic pain     arms    Hypothyroidism     controlled with medication    PEG (percutaneous endoscopic gastrostomy) adjustment/replacement/removal (HCC)     Pneumonia     Pneumonia of both lungs due to Escherichia coli ([facility] [address] 9/4/2019    Throat cancer ([facility] [address] 2005       PSH:  Past Surgical History:   Procedure Laterality Date    ABDOMEN SURGERY PROC UNLISTED  2015    PEG tube insertion    COLONOSCOPY N/A 2/27/2019    COLONOSCOPY BMI 19 performed by Fatuma Hollis MD at [facility] ENDOSCOPY    HX APPENDECTOMY      HX COLONOSCOPY         Allergies: Allergies   Allergen Reactions    Demerol [Meperidine] Rash       Home Medications:  Prior to Admission medications    Medication Sig Start Date End Date Taking? Authorizing Provider   voriconazole (VFEND) 200 mg/5 mL (40 mg/mL) suspension Take 200 mg by mouth two (2) times a day. Yes Provider, Historical   albuterol-ipratropium (DUO-NEB) 2.5 mg-0.5 mg/3 ml nebu 3 mL by Nebulization route. Yes Provider, Historical   nystatin (MYCOSTATIN) 100,000 unit/mL suspension Take 5 mL by mouth four (4) times daily. swish and spit 9/17/19  Yes Sarina Joseph MD   magic mouthwash solution Magic Mouthwash wo Xylocaine  Hydrocortisone powder 15 mg  Nystatin 15 cc  Benadryl Elixir 30 cc  Tetracycline 500mg QS to 120 cc with water    1 teaspoon swish and spit out after meals and at bedtime 8/12/19  Yes Linette Valentine MD   thyroid, Pork, [facility] THYROID) 15 mg tablet 1 q daily 11/14/18  Yes Sarina Joseph MD   budesonide (PULMICORT) 0.25 mg/2 mL nbsp by Nebulization route.     Provider, Historical   ALPRAZolam [place]) 2 mg tablet Take 1 Tab by mouth nightly as needed for Anxiety. Max Daily Amount: 2 mg. 12/5/19   Mague Broderick MD       Hospital Medications:  Current Facility-Administered Medications   Medication Dose Route Frequency    lactated Ringers infusion 1,000 mL  1,000 mL IntraVENous CONTINUOUS       Social History:  Social History     Tobacco Use    Smoking status: Former Smoker     Packs/day: 1.00     Years: 20.00     Pack years: 20.00     Types: Cigarettes     Last attempt to quit: 2005     Years since quitting: 15.0    Smokeless tobacco: Never Used   Substance Use Topics    Alcohol use: No     Frequency: Never         Family History:  Family History   Problem Relation Age of Onset    Diabetes Mother        Review of Systems:  A detailed 10 system ROS is obtained, with pertinent positives as listed above. All others are negative. Objective:     Physical Exam:  Vitals:  Visit Vitals  BP (!) 170/98   Pulse 91   Temp 98 °F (36.7 °C)   Resp 17   SpO2 92%     Gen:  Pt is alert, cooperative, no acute distress  Skin: no large lesions  HEENT: MMM  Cardiovascular: Regular rate and rhythm. No murmurs, gallops, or rubs. Respiratory:  Comfortable breathing with no accessory muscle use. Clear breath sounds with no wheezes, rales, or rhonchi. GI:  Abdomen nondistended, soft, and nontender. Normal active bowel sounds. Neurological:  Gross memory appears intact. Patient is alert and oriented. Psychiatric:  Mood appears appropriate with judgement intact. Laboratory:    No results for input(s): WBC, HGB, HCT, PLT, MCV, NA, K, CL, CO2, BUN, CREA, CA, MG, GLU, AP, SGOT, GPT, TBIL, CBIL, ALB, TP, AML, LPSE, PTP, INR, APTT, HGBEXT, HCTEXT, PLTEXT, INREXT in the last 72 hours. No lab exists for component: DBIL    Assessment:       Active Problems:    * No active hospital problems. *      Plan:     Endoscopy and risks explained to the patient.   Risks including reaction to sedation, cardiopulmonary events, infection, bleeding, perforation, requirement for surgery if complications should occur, death were explained to patient who expressed understanding and agreed to proceed with endoscopy.         Nicki Melendez MD  [facility], [place]

## 2020-01-07 NOTE — PROCEDURES
Esophagogastroduodenoscopy    DATE of PROCEDURE: 1/7/2020    INDICATIONS:  1. Dysphagia  2. Epigastric pain     MEDICATIONS ADMINISTERED: MAC    INSTRUMENT: GIF    PROCEDURE:  After obtaining informed consent, the patient was placed in the left lateral position and sedated. The endoscope was advanced under direct vision without difficulty. The esophagus, stomach (including retroflexed views) and duodenum were evaluated. The patient was taken to the recovery area in stable condition. FINDINGS:  ESOPHAGUS: Stricture in the proximal esophagus that appears to be from radiation. Sequential dilation with 40, 44, then 48-[place] Amanda dilators. No significant trauma after any dilation. STOMACH: Internal bumper of gastric tube is in place and appears intact. No buried bumper. No ulceration underneath the bumper. DUODENUM: Normal with normal transverse views of the papillary     Estimated blood loss: 0-minimal     PLAN:  1.  Sucralfate trial     Fransico Conteh MD  [facility], [place]

## 2020-01-08 NOTE — PROGRESS NOTES
Spiritual Care Visit, initial visit. Visited with patient's wife at bedside. Prayed for patient's healing and health. Visit by Mary Ellen Guerra, Staff .  Lewis, Flaco.EDMAR., B.A.

## 2020-01-21 PROBLEM — J69.0 ASPIRATION PNEUMONIA (HCC): Status: RESOLVED | Noted: 2019-10-02 | Resolved: 2020-01-21

## 2020-01-21 PROBLEM — J96.11 CHRONIC RESPIRATORY FAILURE WITH HYPOXIA (HCC): Status: ACTIVE | Noted: 2019-09-04

## 2020-01-21 PROBLEM — J32.2 SINUSITIS CHRONIC, ETHMOIDAL: Status: ACTIVE | Noted: 2019-10-07

## 2020-03-17 ENCOUNTER — HOSPITAL ENCOUNTER (OUTPATIENT)
Dept: SLEEP MEDICINE | Age: 60
Discharge: HOME | End: 2020-03-17
Payer: COMMERCIAL

## 2020-03-17 ENCOUNTER — HOSPITAL ENCOUNTER (OUTPATIENT)
Dept: LAB | Age: 60
Discharge: HOME | End: 2020-03-17

## 2020-03-17 DIAGNOSIS — R91.8 PULMONARY INFILTRATE: ICD-10-CM

## 2020-03-17 LAB — EOSINOPHIL # BLD: 200 K/UL

## 2020-03-17 PROCEDURE — 89190 NASAL SMEAR FOR EOSINOPHILS: CPT

## 2020-03-17 PROCEDURE — 86003 ALLG SPEC IGE CRUDE XTRC EA: CPT

## 2020-03-17 PROCEDURE — 36415 COLL VENOUS BLD VENIPUNCTURE: CPT

## 2020-03-20 LAB — A FUMIGATUS IGE QN: <0.1 KU/L

## 2020-09-17 ENCOUNTER — HOSPITAL ENCOUNTER (OUTPATIENT)
Dept: LAB | Age: 60
Discharge: HOME | End: 2020-09-17
Payer: COMMERCIAL

## 2020-09-17 DIAGNOSIS — J96.11 CHRONIC RESPIRATORY FAILURE WITH HYPOXIA (HCC): ICD-10-CM

## 2020-09-17 DIAGNOSIS — J11.00 INFLUENZA AND PNEUMONIA: ICD-10-CM

## 2020-09-17 PROCEDURE — 87070 CULTURE OTHR SPECIMN AEROBIC: CPT

## 2020-09-17 PROCEDURE — 87186 SC STD MICRODIL/AGAR DIL: CPT

## 2020-09-17 PROCEDURE — 87077 CULTURE AEROBIC IDENTIFY: CPT

## 2021-06-09 NOTE — CONSULTS
"After completing the FJI, the patient sat up and instantly felt dizzy and it felt like \"things are swirling and going in circles\". She was brought back to the room in a wheelchair and given Zofran 4 mg ODT. She did feel very dizzy and family was notified. After feeling this way for over an hour, she was given another Zofran 4 mg ODT since she was nauseated. She did not want to lay down because each time she moved she would feel worse. After talking to Dr. Stallworth, she thought it best that we send her to the ED since things were not getting any better. EMS was called and patient was transported to [facility] ED.  IV was still in place.   " HPI: 
aMrta Lin is a 60 y.o. male seen for a consultation from Dr. Drew Myers for purulent drainage from the nose. He is a known patient to my partner Dr. Avery Aquino. He has a history of nasal polyps and hypopharyngeal Ca. He was seen by Dr. Avery Aquino in August and there was no sign of recurrence at that time but had recently finished antibiotics and was placed on more antibiotics and for his sinus issues. He then saw Dr. Samy Sandoval with [facility] a few days ago and he found sinusitis on his scope and checked a CT of the sinuses. This showed swelling in the nasal cavity consistent with his nasal polyp history and some mild mucosal thickening of the left maxillary sinus. No acute sinusitis. He has been having issues with hypoxemia and was eventually admitted for that and found to have pneumonia. It is felt that he is having aspiration pneumonia potentially exacerbated by the sinus drainage. Past Medical History, Past Surgical History, Family history, Social History, and Medications were all reviewed with the patient today and updated as necessary. Allergies Allergen Reactions  Demerol [Meperidine] Rash Patient Active Problem List  
Diagnosis Code  Feeding difficulties and mismanagement R63.3  Acute and chronic respiratory failure with hypoxia (HCC) J96.21  
 Aspiration, chronic pulmonary T17.908A  Aspiration pneumonia ([address] J69.0  Acquired hypothyroidism E03.9  Dysphagia R13.10  
 H/O laryngeal cancer Z85.21  
 Protein calorie malnutrition (HCC) E46  
 Pulmonary infiltrates R91.8  Sinusitis J32.9 Current Facility-Administered Medications Medication Dose Route Frequency  NUTRITIONAL SUPPORT ELECTROLYTE PRN ORDERS   Does Not Apply PRN  
 morphine injection 2 mg  2 mg IntraVENous Q4H PRN  
 sodium chloride (NS) flush 5-40 mL  5-40 mL IntraVENous Q8H  
 sodium chloride (NS) flush 5-40 mL  5-40 mL IntraVENous PRN  
  acetaminophen (TYLENOL) tablet 650 mg  650 mg Oral Q4H PRN  
 HYDROcodone-acetaminophen (NORCO) 5-325 mg per tablet 1 Tab  1 Tab Oral Q4H PRN  
 naloxone (NARCAN) injection 0.4 mg  0.4 mg IntraVENous PRN  
 bisacodyl (DULCOLAX) tablet 5 mg  5 mg Oral DAILY PRN  
 enoxaparin (LOVENOX) injection 40 mg  40 mg SubCUTAneous Q24H  
 albuterol-ipratropium (DUO-NEB) 2.5 MG-0.5 MG/3 ML  3 mL Nebulization Q6H RT  
 albuterol-ipratropium (DUO-NEB) 2.5 MG-0.5 MG/3 ML  3 mL Nebulization Q4H PRN  
 cefepime (MAXIPIME) 1 g in 0.9% sodium chloride (MBP/ADV) 50 mL  1 g IntraVENous Q8H  
 metroNIDAZOLE (FLAGYL) IVPB premix 500 mg  500 mg IntraVENous Q8H  
 thyroid (Pork) (ARMOUR) tablet 15 mg  15 mg Oral DAILY  predniSONE (DELTASONE) tablet 40 mg  40 mg Oral DAILY WITH BREAKFAST Past Medical History:  
Diagnosis Date  Acute maxillary sinusitis 2019  Chronic pain   
 arms  Hypothyroidism   
 controlled with medication  PEG (percutaneous endoscopic gastrostomy) adjustment/replacement/removal ([facility] [address]  Pneumonia  Pneumonia of both lungs due to Escherichia coli ([facility] [address] 2019  Throat cancer ([facility] [address] 2005 Social History Tobacco Use  Smoking status: Former Smoker Packs/day: 1.00 Years: 20.00 Pack years: 20.00 Types: Cigarettes Last attempt to quit:  Years since quittin.7  Smokeless tobacco: Never Used Substance Use Topics  Alcohol use: No  
  Frequency: Never Past Surgical History:  
Procedure Laterality Date [address] UNLISTED   PEG tube insertion  COLONOSCOPY N/A 2019 COLONOSCOPY BMI 19 performed by Digna Celaya MD at [facility]  HX COLONOSCOPY Family History Problem Relation Age of Onset  Diabetes Mother ROS: 
 
Review of Systems Constitutional: Negative for fever. HENT: Negative for hearing loss. Eyes: Negative for blurred vision. Respiratory: Negative for cough. Cardiovascular: Negative for chest pain. Gastrointestinal: Negative for heartburn. Musculoskeletal: Negative for neck pain. Skin: Negative for rash. Neurological: Negative for dizziness. Endo/Heme/Allergies: Does not bruise/bleed easily. Psychiatric/Behavioral: Negative for depression. PHYSICAL EXAM: 
 
Visit Vitals /78 (BP 1 Location: Right arm, BP Patient Position: At rest) Pulse 83 Temp 98.5 °F (36.9 °C) Resp 17 Ht 5' 11\" (1.803 m) Wt 120 lb (54.4 kg) SpO2 97% BMI 16.74 kg/m² Head Head and Face - The head and face are atraumatic, normocephalic. The salivary glands are intact and the facial appearance is symmetric. Head shape - No scars, lesions, or masses Ear Ear - Tympanic membranes are clear, the external auditory canal is without discharge and the tympanic membranes are mobile. There is no tympanic membrane erythema and no middle ear opacity is visualized. Pinna: bilateral - No hematomas or lacerations Eye Eyeball - bilateral - extraocular motions intact, equal in size and movement Nose and Sinuses Nose - mucosa is pink and the septum is deviated bilaterally. There are no nasal lesions and there was mild turbinate hypertrophy. Polyps on the left Mouth and Throat Lips - upper lip - normal: no dryness, cracking, pallor, cyanosis, or vesicular eruption. Lower lip: normal: no dryness, cracking, pallor, cyanosis, or vesicular eruption. Teeth and Gums - No bleeding, no inflammation or ulceration. Lips - Pink and symmetrical 
Oral Cavity - Oral mucosa pink, soft and hard palates contiguous and tongue moist without ulcers. The mucosa is without ulcerations. No oral cavity masses present. Parotid Gland - Bilateral - Non tender, not swollen. Oropharynx - No discharge or Erythema Nasopharynx - Non obstructed, mucosa pink and moist.   
Hypopharynx - No erythema Submandibular Gland - Non tender, not swollen. Tonsils - Normal 
 
Neck Neck - Full range of motion and Supple. Non Tender. No Masses. Trachea - Midline. Thyroid - Gland - Symmetric. Non Tender. Nodules - No nodules. Neurologic - II - XII Grossly intact bilaterally Cardiac Inspection - Jugular Vein:  Bilateral - non distended, no prominent pulsations Chest and Lung Inspection - Movements:  Chest symmetrical with bilateral expansion, respirations even and non labored ASSESSMENT and PLAN 
 
  ICD-10-CM ICD-9-CM 1. Pneumonia of right lower lobe due to infectious organism ([facility] [address] J18.1 486 2. Acute and chronic respiratory failure with hypoxia (HCC) J96.21 518.84   
  799.02   
3. Chronic pulmonary aspiration, sequela T17.908S 908.5 4. Other dysphagia R13.19 787.29   
5. H/O laryngeal cancer Z85.21 V10.21 6. Pulmonary infiltrates R91.8 793.19   
7. Other chronic sinusitis J32.8 473.8 Pt counseled. He had a CT of the sinuses done 2 days ago at [facility]. This showed some thickened mucosal with in the nasal cavity, consistent with the polyps seen in the office and the known history of nasal polyps. There is some mild mucosal thickening of the left maxillary sinus with previous surgical changes seen. Given his current history, this is likely a chronic inflammation (polyps, sinus mucosal thickening) that is contributing to a chronic thick mucoid drainage. He could benefit from nasal nebulized antibiotics/steroids. I don't know if this can be provided in the in patient setting. But from the current scan, no surgical intervention is needed at this time. Will continue to monitor for now. Yang Urbina,  
10/3/2019

## 2021-10-16 NOTE — PROGRESS NOTES
Patient is discharging home today. No discharge planning needs identified at this time. Case Management will remain available to assist as needed. Care Management Interventions  PCP Verified by CM:  Yes  Transition of Care Consult (CM Consult): Discharge Planning  Discharge Durable Medical Equipment: No  Physical Therapy Consult: No  Occupational Therapy Consult: No  Speech Therapy Consult: No  Current Support Network: Own Home  Plan discussed with Pt/Family/Caregiver: Yes  Freedom of Choice Offered: Yes  Discharge Location  Discharge Placement: Home No

## 2022-09-14 NOTE — PROGRESS NOTES
----- Message from Nathalie Aggarwal sent at 9/14/2022 10:51 AM CDT -----  Regarding: Referral for Dr. Gamble Nearing Dr. Patsy Bob or Staff,  Dr. Ricky Diaz office called me today (Rita Mia Church 09/05/19 2236   Vitals   Temp 98.1 °F (36.7 °C)   Temp Source Oral   Pulse (Heart Rate) 93   Resp Rate 18   O2 Sat (%) 92 %   Pain 1   Pain Scale 1 Numeric (0 - 10)   Pain Intensity 1 6   Patient Stated Pain Goal 0   Pain Onset 1 acute   Pain Location 1 Chest;Back;Rib cage   Pain Orientation 1 Posterior;Mid   Pain Description 1 Aching   Pain Intervention(s) 1 Medication (see MAR)   Patient received NORCO  mg via G tube per request

## (undated) DEVICE — NDL PRT INJ NSAF BLNT 18GX1.5 --

## (undated) DEVICE — NET RETRV FB ENDOSCP 2.5MMX230 -- ROTH NET

## (undated) DEVICE — CONTAINER PREFIL FRMLN 40ML --

## (undated) DEVICE — KENDALL RADIOLUCENT FOAM MONITORING ELECTRODE RECTANGULAR SHAPE: Brand: KENDALL

## (undated) DEVICE — BLOCK BITE AD 60FR W/ VELC STRP ADDRESSES MOST PT AND

## (undated) DEVICE — BRONCHOSCOPY PACK: Brand: MEDLINE INDUSTRIES, INC.

## (undated) DEVICE — SNARE POLYP SM W13MMXL240CM SHTH DIA2.4MM OVL FLX DISP

## (undated) DEVICE — REM POLYHESIVE ADULT PATIENT RETURN ELECTRODE: Brand: VALLEYLAB

## (undated) DEVICE — SINGLE USE BIOPSY VALVE MAJ-210: Brand: SINGLE USE BIOPSY VALVE (STERILE)

## (undated) DEVICE — SYR 5ML 1/5 GRAD LL NSAF LF --

## (undated) DEVICE — CONNECTOR TBNG OD5-7MM O2 END DISP

## (undated) DEVICE — MIC-KEY GASTROSTOMY FEEDING TUBE

## (undated) DEVICE — CANNULA NSL ORAL AD FOR CAPNOFLEX CO2 O2 AIRLFE

## (undated) DEVICE — SYR 3ML LL TIP 1/10ML GRAD --

## (undated) DEVICE — MOUTHPIECE ENDOSCP 20X27MM --

## (undated) DEVICE — SYR 50ML SLIP TIP NSAF LF STRL --

## (undated) DEVICE — SINGLE USE SUCTION VALVE MAJ-209: Brand: SINGLE USE SUCTION VALVE (STERILE)

## (undated) DEVICE — FORCEPS BX L240CM JAW DIA2.8MM L CAP W/ NDL MIC MESH TOOTH